# Patient Record
Sex: FEMALE | Race: OTHER | NOT HISPANIC OR LATINO
[De-identification: names, ages, dates, MRNs, and addresses within clinical notes are randomized per-mention and may not be internally consistent; named-entity substitution may affect disease eponyms.]

---

## 2017-02-15 ENCOUNTER — APPOINTMENT (OUTPATIENT)
Dept: OPHTHALMOLOGY | Facility: CLINIC | Age: 72
End: 2017-02-15

## 2017-03-07 ENCOUNTER — APPOINTMENT (OUTPATIENT)
Dept: OPHTHALMOLOGY | Facility: CLINIC | Age: 72
End: 2017-03-07

## 2017-04-22 ENCOUNTER — EMERGENCY (EMERGENCY)
Facility: HOSPITAL | Age: 72
LOS: 1 days | Discharge: HOME | End: 2017-04-22
Attending: EMERGENCY MEDICINE | Admitting: EMERGENCY MEDICINE
Payer: MEDICARE

## 2017-04-22 VITALS
HEART RATE: 77 BPM | TEMPERATURE: 98 F | DIASTOLIC BLOOD PRESSURE: 82 MMHG | RESPIRATION RATE: 18 BRPM | OXYGEN SATURATION: 98 % | SYSTOLIC BLOOD PRESSURE: 181 MMHG

## 2017-04-22 PROCEDURE — 99283 EMERGENCY DEPT VISIT LOW MDM: CPT | Mod: 25

## 2017-04-22 PROCEDURE — 99053 MED SERV 10PM-8AM 24 HR FAC: CPT

## 2017-04-22 NOTE — ED ADULT TRIAGE NOTE - CHIEF COMPLAINT QUOTE
right leg pain from her hip to the right foot.. Pain started around 9pm. Has not taken anything for the pain pt has PMH of rheumatoid arthritis.  c/o nausea. Denies fever.

## 2017-04-22 NOTE — ED ADULT NURSE NOTE - OBJECTIVE STATEMENT
Pt rec'd A&Ox3 c/o of b/l leg cramping and numbness  started 9pm last night and progressed throughout the night. Pt states pain begins in feet and radiated up towards thighs. Pt states movement worsens symptoms. States has arthritis and was wearing pain patches on b/l knees last night and removed after pain started. On exam pt states pain is minimal currently and sensory and neuro exam intact, + b/l ROM and strength in all extremities. slight edema on blle. Denies any increased WOB, pt reports hx of COPD, HTN. Denies recent travel.

## 2017-04-22 NOTE — ED PROVIDER NOTE - PHYSICAL EXAMINATION
Michael att: General: Well appearing, nontoxic, no acute distress; Head: Normocephalic Atraumatic; Eyes: PERRL, EOMI; ENT: Airway patent; Neck: supple; Chest: Lungs clear to auscultation bilateral; Cardiac: Regular rate and rhythm, no murmurs, rubs or gallops; Abdomen: soft, nontender, nondistended; no guarding or rebound; Musculoskeletal: Calves symmetric, nontender, no palpable cord, no discoloration, 2+DP; Skin: No rash, normal skin tone; Neuro: Alert and Oriented to person, place, and time; No focal deficit, CN 2-12 symmetric and intact, strength 5/5

## 2017-04-22 NOTE — ED PROVIDER NOTE - MEDICAL DECISION MAKING DETAILS
71F p/w bilateral leg cramping starting last night, intermittent, in setting of using unknown medical patches, concerning for adverse side effect  -pain control, reassurance 71F p/w bilateral leg cramping starting last night, intermittent, in setting of using unknown medical patches, concerning for adverse side effect  Micheal att: 72 yo woman presenting with transient leg cramps, resolved upon arrival.  No e/o DVT, infection, arterial insufficiency, cord compression, cauda equina. Patient informed of ED visit findings, understands plan.  Patient provided with written and further verbal instructions not included in discharge paperwork.  Patient instructed to follow up with their primary care physician in 2-3 days and return for new, worsened, or persistent symptoms.    -pain control, reassurance

## 2017-04-22 NOTE — ED PROVIDER NOTE - OBJECTIVE STATEMENT
71F h/o HTN, COPD, arthritis presenting with leg pain. Notes bilateral leg pain starting last night, starting in the feet and radiating up the legs to thighs, 2/10, cramping, intermittent, lasting few minutes at a time. Was using medical patches on both knees (unknown Chinese medicine) over past few days, removed them today when pain started. Denies F, CP, SOB, abd pain, N/V/D, weakness, numbness. 71F h/o HTN, COPD, arthritis presenting with leg pain. Notes bilateral leg pain starting last night, starting in the feet and radiating up the legs to thighs, 2/10, cramping, intermittent, lasting few minutes at a time. States she drank some water and her symptoms resolved.  Was using medical patches on both knees (unknown Chinese medicine) over past few days, removed them today when pain started. Denies F, CP, SOB, abd pain, N/V/D, weakness, numbness.

## 2018-05-29 ENCOUNTER — APPOINTMENT (OUTPATIENT)
Dept: OPHTHALMOLOGY | Facility: CLINIC | Age: 73
End: 2018-05-29
Payer: MEDICARE

## 2018-05-29 PROCEDURE — 92133 CPTRZD OPH DX IMG PST SGM ON: CPT

## 2018-05-29 PROCEDURE — 92225: CPT | Mod: RT

## 2018-05-29 PROCEDURE — 92014 COMPRE OPH EXAM EST PT 1/>: CPT

## 2018-05-29 PROCEDURE — 92083 EXTENDED VISUAL FIELD XM: CPT

## 2019-01-17 ENCOUNTER — APPOINTMENT (OUTPATIENT)
Dept: OPHTHALMOLOGY | Facility: CLINIC | Age: 74
End: 2019-01-17
Payer: MEDICARE

## 2019-01-17 PROCEDURE — 92226: CPT | Mod: RT

## 2019-01-17 PROCEDURE — 92014 COMPRE OPH EXAM EST PT 1/>: CPT

## 2019-01-17 PROCEDURE — 92286 ANT SGM IMG I&R SPECLR MIC: CPT

## 2019-03-27 ENCOUNTER — EMERGENCY (EMERGENCY)
Facility: HOSPITAL | Age: 74
LOS: 1 days | Discharge: HOME | End: 2019-03-27
Attending: EMERGENCY MEDICINE | Admitting: EMERGENCY MEDICINE
Payer: MEDICARE

## 2019-03-27 VITALS
HEART RATE: 93 BPM | TEMPERATURE: 98 F | OXYGEN SATURATION: 100 % | SYSTOLIC BLOOD PRESSURE: 131 MMHG | DIASTOLIC BLOOD PRESSURE: 93 MMHG | RESPIRATION RATE: 18 BRPM

## 2019-03-27 PROCEDURE — 99284 EMERGENCY DEPT VISIT MOD MDM: CPT | Mod: 25

## 2019-03-27 NOTE — ED ADULT TRIAGE NOTE - CHIEF COMPLAINT QUOTE
pt c/o abdominal pain, was seen by her pmd at diagnosed with  a uti, placed on amoxicillin last week, states "I still do not feel well," no fevers, pt also states she hit her head a month ago on the cabinet and c/o headache, no blurry vision pmh htn. comfortable in triage.

## 2019-03-28 VITALS
RESPIRATION RATE: 17 BRPM | OXYGEN SATURATION: 98 % | SYSTOLIC BLOOD PRESSURE: 130 MMHG | DIASTOLIC BLOOD PRESSURE: 70 MMHG | HEART RATE: 75 BPM

## 2019-03-28 LAB
ALBUMIN SERPL ELPH-MCNC: 3.3 G/DL — SIGNIFICANT CHANGE UP (ref 3.3–5)
ALP SERPL-CCNC: 77 U/L — SIGNIFICANT CHANGE UP (ref 40–120)
ALT FLD-CCNC: 34 U/L — HIGH (ref 4–33)
ANION GAP SERPL CALC-SCNC: 12 MMO/L — SIGNIFICANT CHANGE UP (ref 7–14)
APPEARANCE UR: CLEAR — SIGNIFICANT CHANGE UP
AST SERPL-CCNC: 51 U/L — HIGH (ref 4–32)
BACTERIA # UR AUTO: NEGATIVE — SIGNIFICANT CHANGE UP
BASOPHILS # BLD AUTO: 0.03 K/UL — SIGNIFICANT CHANGE UP (ref 0–0.2)
BASOPHILS NFR BLD AUTO: 0.3 % — SIGNIFICANT CHANGE UP (ref 0–2)
BILIRUB SERPL-MCNC: 0.7 MG/DL — SIGNIFICANT CHANGE UP (ref 0.2–1.2)
BILIRUB UR-MCNC: NEGATIVE — SIGNIFICANT CHANGE UP
BLOOD UR QL VISUAL: SIGNIFICANT CHANGE UP
BUN SERPL-MCNC: 18 MG/DL — SIGNIFICANT CHANGE UP (ref 7–23)
CALCIUM SERPL-MCNC: 8.6 MG/DL — SIGNIFICANT CHANGE UP (ref 8.4–10.5)
CHLORIDE SERPL-SCNC: 100 MMOL/L — SIGNIFICANT CHANGE UP (ref 98–107)
CO2 SERPL-SCNC: 20 MMOL/L — LOW (ref 22–31)
COLOR SPEC: YELLOW — SIGNIFICANT CHANGE UP
CREAT SERPL-MCNC: 0.71 MG/DL — SIGNIFICANT CHANGE UP (ref 0.5–1.3)
EOSINOPHIL # BLD AUTO: 0.07 K/UL — SIGNIFICANT CHANGE UP (ref 0–0.5)
EOSINOPHIL NFR BLD AUTO: 0.7 % — SIGNIFICANT CHANGE UP (ref 0–6)
GLUCOSE SERPL-MCNC: 123 MG/DL — HIGH (ref 70–99)
GLUCOSE UR-MCNC: NEGATIVE — SIGNIFICANT CHANGE UP
HCT VFR BLD CALC: 40.9 % — SIGNIFICANT CHANGE UP (ref 34.5–45)
HGB BLD-MCNC: 13.6 G/DL — SIGNIFICANT CHANGE UP (ref 11.5–15.5)
HYALINE CASTS # UR AUTO: SIGNIFICANT CHANGE UP
IMM GRANULOCYTES NFR BLD AUTO: 0.3 % — SIGNIFICANT CHANGE UP (ref 0–1.5)
KETONES UR-MCNC: SIGNIFICANT CHANGE UP
LEUKOCYTE ESTERASE UR-ACNC: SIGNIFICANT CHANGE UP
LIDOCAIN IGE QN: 37.3 U/L — SIGNIFICANT CHANGE UP (ref 7–60)
LYMPHOCYTES # BLD AUTO: 1.32 K/UL — SIGNIFICANT CHANGE UP (ref 1–3.3)
LYMPHOCYTES # BLD AUTO: 13.4 % — SIGNIFICANT CHANGE UP (ref 13–44)
MCHC RBC-ENTMCNC: 30.2 PG — SIGNIFICANT CHANGE UP (ref 27–34)
MCHC RBC-ENTMCNC: 33.3 % — SIGNIFICANT CHANGE UP (ref 32–36)
MCV RBC AUTO: 90.7 FL — SIGNIFICANT CHANGE UP (ref 80–100)
MONOCYTES # BLD AUTO: 1.04 K/UL — HIGH (ref 0–0.9)
MONOCYTES NFR BLD AUTO: 10.6 % — SIGNIFICANT CHANGE UP (ref 2–14)
NEUTROPHILS # BLD AUTO: 7.34 K/UL — SIGNIFICANT CHANGE UP (ref 1.8–7.4)
NEUTROPHILS NFR BLD AUTO: 74.7 % — SIGNIFICANT CHANGE UP (ref 43–77)
NITRITE UR-MCNC: NEGATIVE — SIGNIFICANT CHANGE UP
NRBC # FLD: 0 K/UL — SIGNIFICANT CHANGE UP (ref 0–0)
PH UR: 6 — SIGNIFICANT CHANGE UP (ref 5–8)
PLATELET # BLD AUTO: 193 K/UL — SIGNIFICANT CHANGE UP (ref 150–400)
PMV BLD: 10.2 FL — SIGNIFICANT CHANGE UP (ref 7–13)
POTASSIUM SERPL-MCNC: 4.7 MMOL/L — SIGNIFICANT CHANGE UP (ref 3.5–5.3)
POTASSIUM SERPL-SCNC: 4.7 MMOL/L — SIGNIFICANT CHANGE UP (ref 3.5–5.3)
PROT SERPL-MCNC: 6.8 G/DL — SIGNIFICANT CHANGE UP (ref 6–8.3)
PROT UR-MCNC: 30 — SIGNIFICANT CHANGE UP
RBC # BLD: 4.51 M/UL — SIGNIFICANT CHANGE UP (ref 3.8–5.2)
RBC # FLD: 11.9 % — SIGNIFICANT CHANGE UP (ref 10.3–14.5)
RBC CASTS # UR COMP ASSIST: HIGH (ref 0–?)
SODIUM SERPL-SCNC: 132 MMOL/L — LOW (ref 135–145)
SP GR SPEC: 1.02 — SIGNIFICANT CHANGE UP (ref 1–1.04)
SQUAMOUS # UR AUTO: SIGNIFICANT CHANGE UP
UROBILINOGEN FLD QL: NORMAL — SIGNIFICANT CHANGE UP
WBC # BLD: 9.83 K/UL — SIGNIFICANT CHANGE UP (ref 3.8–10.5)
WBC # FLD AUTO: 9.83 K/UL — SIGNIFICANT CHANGE UP (ref 3.8–10.5)
WBC UR QL: HIGH (ref 0–?)

## 2019-03-28 PROCEDURE — 70450 CT HEAD/BRAIN W/O DYE: CPT | Mod: 26

## 2019-03-28 RX ORDER — FAMOTIDINE 10 MG/ML
20 INJECTION INTRAVENOUS ONCE
Qty: 0 | Refills: 0 | Status: COMPLETED | OUTPATIENT
Start: 2019-03-28 | End: 2019-03-28

## 2019-03-28 RX ORDER — ACETAMINOPHEN 500 MG
650 TABLET ORAL ONCE
Qty: 0 | Refills: 0 | Status: COMPLETED | OUTPATIENT
Start: 2019-03-28 | End: 2019-03-28

## 2019-03-28 RX ADMIN — FAMOTIDINE 20 MILLIGRAM(S): 10 INJECTION INTRAVENOUS at 03:23

## 2019-03-28 RX ADMIN — Medication 650 MILLIGRAM(S): at 04:20

## 2019-03-28 RX ADMIN — Medication 650 MILLIGRAM(S): at 03:23

## 2019-03-28 RX ADMIN — Medication 30 MILLILITER(S): at 03:23

## 2019-03-28 NOTE — ED ADULT NURSE NOTE - OBJECTIVE STATEMENT
pt received in room 1, A&Ox3, c/o abd pain x2 weeks, and a headache [id number]. pt states she lost her balance a month ago and hit her head, no contusions noted, pt saw her pmd recently and was on amoxicillin last week for a UTI. pt breathing even & unlabored, abd tender all over, voluntary guarding. pt denies SOB,cp, fever, chills, numbness, tingling, weakness, dizziness, syncope, dysuria, no difference in eating/drinking. 20G I vplaced in R forearm, labs sent, will continue to monitor.

## 2019-03-28 NOTE — ED PROVIDER NOTE - NSFOLLOWUPINSTRUCTIONS_ED_ALL_ED_FT
Drink plenty of fluids.  Continue to take your antibiotics as prescribed.  You can take ibuprofen 600mg every 6 hours or Tylenol 650mg every 4 hours as needed for pain or fever.  Follow-up with your PMD in 24-48 hours.  Return to the emergency department for any new or worsening symptoms.

## 2019-03-28 NOTE — ED PROVIDER NOTE - CLINICAL SUMMARY MEDICAL DECISION MAKING FREE TEXT BOX
74 y/o F with headache s/p low-impact trauma.  No red flags, normal neuro exam.  Pt initial c/o abd pain, but none on my evaluation or exam.  Will give tylenol, labs, ct head given age, reassess.

## 2019-03-28 NOTE — ED PROVIDER NOTE - OBJECTIVE STATEMENT
72 y/o F with h/o HTN, hyperlipidemia here with headache.  Pt reports that over the past 1 month she has hit her head on cabinets in her home on accident.  She reports an episode 1 month ago and another episode on Monday where she accidentally struck her right frontal head on a cabinet in the bathroom.  She denies any LOC or fall.  No other injuries or trauma.  Pt reports headache to the site of impact since then.  No confusion, vision changes, vomiting, weakness.  Pt also reports that she was seen by PMD on Monday and started on abx for UTI.  She reports some stomach upset on the antibiotic, but states that her dysuria is resolved.  No abd pain currently.  Pt does not take any antiplatelet or anticoagulants.

## 2019-03-28 NOTE — ED PROVIDER NOTE - PROGRESS NOTE DETAILS
Giuliano ADAMS: CT negative for traumatic injury.  Labs reviewed, UA with LE but no bacturia and pt is currently being treated for UTI with PO abx and reports improvement in sxs, will just have complete outpatient course.

## 2019-03-29 LAB — SPECIMEN SOURCE: SIGNIFICANT CHANGE UP

## 2019-03-30 LAB — BACTERIA UR CULT: SIGNIFICANT CHANGE UP

## 2019-05-16 ENCOUNTER — APPOINTMENT (OUTPATIENT)
Dept: OPHTHALMOLOGY | Facility: CLINIC | Age: 74
End: 2019-05-16

## 2019-08-13 ENCOUNTER — APPOINTMENT (OUTPATIENT)
Dept: OPHTHALMOLOGY | Facility: CLINIC | Age: 74
End: 2019-08-13

## 2019-08-13 ENCOUNTER — NON-APPOINTMENT (OUTPATIENT)
Age: 74
End: 2019-08-13

## 2019-08-13 ENCOUNTER — APPOINTMENT (OUTPATIENT)
Dept: OPHTHALMOLOGY | Facility: CLINIC | Age: 74
End: 2019-08-13
Payer: MEDICARE

## 2019-08-13 PROCEDURE — 92015 DETERMINE REFRACTIVE STATE: CPT

## 2019-08-13 PROCEDURE — 92014 COMPRE OPH EXAM EST PT 1/>: CPT

## 2019-08-13 PROCEDURE — 92083 EXTENDED VISUAL FIELD XM: CPT

## 2019-08-13 PROCEDURE — 92133 CPTRZD OPH DX IMG PST SGM ON: CPT

## 2019-12-06 ENCOUNTER — EMERGENCY (EMERGENCY)
Facility: HOSPITAL | Age: 74
LOS: 1 days | Discharge: HOME | End: 2019-12-06
Attending: EMERGENCY MEDICINE | Admitting: EMERGENCY MEDICINE
Payer: MEDICARE

## 2019-12-06 VITALS
DIASTOLIC BLOOD PRESSURE: 67 MMHG | OXYGEN SATURATION: 99 % | RESPIRATION RATE: 16 BRPM | TEMPERATURE: 98 F | HEART RATE: 88 BPM | SYSTOLIC BLOOD PRESSURE: 120 MMHG

## 2019-12-06 VITALS
OXYGEN SATURATION: 98 % | DIASTOLIC BLOOD PRESSURE: 53 MMHG | HEART RATE: 71 BPM | RESPIRATION RATE: 17 BRPM | TEMPERATURE: 98 F | SYSTOLIC BLOOD PRESSURE: 104 MMHG

## 2019-12-06 LAB
ALBUMIN SERPL ELPH-MCNC: 3.9 G/DL — SIGNIFICANT CHANGE UP (ref 3.3–5)
ALP SERPL-CCNC: 105 U/L — SIGNIFICANT CHANGE UP (ref 40–120)
ALT FLD-CCNC: 70 U/L — HIGH (ref 4–33)
ANION GAP SERPL CALC-SCNC: 13 MMO/L — SIGNIFICANT CHANGE UP (ref 7–14)
APPEARANCE UR: SIGNIFICANT CHANGE UP
AST SERPL-CCNC: 73 U/L — HIGH (ref 4–32)
BACTERIA # UR AUTO: HIGH
BASOPHILS # BLD AUTO: 0.04 K/UL — SIGNIFICANT CHANGE UP (ref 0–0.2)
BASOPHILS NFR BLD AUTO: 0.5 % — SIGNIFICANT CHANGE UP (ref 0–2)
BILIRUB SERPL-MCNC: 1.2 MG/DL — SIGNIFICANT CHANGE UP (ref 0.2–1.2)
BILIRUB UR-MCNC: NEGATIVE — SIGNIFICANT CHANGE UP
BLOOD UR QL VISUAL: SIGNIFICANT CHANGE UP
BUN SERPL-MCNC: 24 MG/DL — HIGH (ref 7–23)
CALCIUM SERPL-MCNC: 9.2 MG/DL — SIGNIFICANT CHANGE UP (ref 8.4–10.5)
CHLORIDE SERPL-SCNC: 94 MMOL/L — LOW (ref 98–107)
CO2 SERPL-SCNC: 25 MMOL/L — SIGNIFICANT CHANGE UP (ref 22–31)
COLOR SPEC: YELLOW — SIGNIFICANT CHANGE UP
CREAT SERPL-MCNC: 0.89 MG/DL — SIGNIFICANT CHANGE UP (ref 0.5–1.3)
EOSINOPHIL # BLD AUTO: 0.07 K/UL — SIGNIFICANT CHANGE UP (ref 0–0.5)
EOSINOPHIL NFR BLD AUTO: 0.8 % — SIGNIFICANT CHANGE UP (ref 0–6)
GLUCOSE SERPL-MCNC: 126 MG/DL — HIGH (ref 70–99)
GLUCOSE UR-MCNC: NEGATIVE — SIGNIFICANT CHANGE UP
HCT VFR BLD CALC: 37.7 % — SIGNIFICANT CHANGE UP (ref 34.5–45)
HGB BLD-MCNC: 13 G/DL — SIGNIFICANT CHANGE UP (ref 11.5–15.5)
HYALINE CASTS # UR AUTO: NEGATIVE — SIGNIFICANT CHANGE UP
IMM GRANULOCYTES NFR BLD AUTO: 0.5 % — SIGNIFICANT CHANGE UP (ref 0–1.5)
KETONES UR-MCNC: NEGATIVE — SIGNIFICANT CHANGE UP
LEUKOCYTE ESTERASE UR-ACNC: SIGNIFICANT CHANGE UP
LYMPHOCYTES # BLD AUTO: 1.62 K/UL — SIGNIFICANT CHANGE UP (ref 1–3.3)
LYMPHOCYTES # BLD AUTO: 18.3 % — SIGNIFICANT CHANGE UP (ref 13–44)
MCHC RBC-ENTMCNC: 30.4 PG — SIGNIFICANT CHANGE UP (ref 27–34)
MCHC RBC-ENTMCNC: 34.5 % — SIGNIFICANT CHANGE UP (ref 32–36)
MCV RBC AUTO: 88.3 FL — SIGNIFICANT CHANGE UP (ref 80–100)
MONOCYTES # BLD AUTO: 1.06 K/UL — HIGH (ref 0–0.9)
MONOCYTES NFR BLD AUTO: 12 % — SIGNIFICANT CHANGE UP (ref 2–14)
NEUTROPHILS # BLD AUTO: 6.01 K/UL — SIGNIFICANT CHANGE UP (ref 1.8–7.4)
NEUTROPHILS NFR BLD AUTO: 67.9 % — SIGNIFICANT CHANGE UP (ref 43–77)
NITRITE UR-MCNC: POSITIVE — HIGH
NRBC # FLD: 0 K/UL — SIGNIFICANT CHANGE UP (ref 0–0)
PH UR: 6 — SIGNIFICANT CHANGE UP (ref 5–8)
PLATELET # BLD AUTO: 244 K/UL — SIGNIFICANT CHANGE UP (ref 150–400)
PMV BLD: 9.4 FL — SIGNIFICANT CHANGE UP (ref 7–13)
POTASSIUM SERPL-MCNC: 3.8 MMOL/L — SIGNIFICANT CHANGE UP (ref 3.5–5.3)
POTASSIUM SERPL-SCNC: 3.8 MMOL/L — SIGNIFICANT CHANGE UP (ref 3.5–5.3)
PROT SERPL-MCNC: 7.7 G/DL — SIGNIFICANT CHANGE UP (ref 6–8.3)
PROT UR-MCNC: 20 — SIGNIFICANT CHANGE UP
RBC # BLD: 4.27 M/UL — SIGNIFICANT CHANGE UP (ref 3.8–5.2)
RBC # FLD: 11.6 % — SIGNIFICANT CHANGE UP (ref 10.3–14.5)
RBC CASTS # UR COMP ASSIST: SIGNIFICANT CHANGE UP (ref 0–?)
SODIUM SERPL-SCNC: 132 MMOL/L — LOW (ref 135–145)
SP GR SPEC: 1.01 — SIGNIFICANT CHANGE UP (ref 1–1.04)
SQUAMOUS # UR AUTO: SIGNIFICANT CHANGE UP
TROPONIN T, HIGH SENSITIVITY: 10 NG/L — SIGNIFICANT CHANGE UP (ref ?–14)
TROPONIN T, HIGH SENSITIVITY: 11 NG/L — SIGNIFICANT CHANGE UP (ref ?–14)
TROPONIN T, HIGH SENSITIVITY: 8 NG/L — SIGNIFICANT CHANGE UP (ref ?–14)
UROBILINOGEN FLD QL: NORMAL — SIGNIFICANT CHANGE UP
WBC # BLD: 8.84 K/UL — SIGNIFICANT CHANGE UP (ref 3.8–10.5)
WBC # FLD AUTO: 8.84 K/UL — SIGNIFICANT CHANGE UP (ref 3.8–10.5)
WBC UR QL: >50 — HIGH (ref 0–?)

## 2019-12-06 PROCEDURE — 93010 ELECTROCARDIOGRAM REPORT: CPT

## 2019-12-06 PROCEDURE — 71046 X-RAY EXAM CHEST 2 VIEWS: CPT | Mod: 26

## 2019-12-06 PROCEDURE — 99284 EMERGENCY DEPT VISIT MOD MDM: CPT | Mod: 25

## 2019-12-06 RX ORDER — SODIUM CHLORIDE 9 MG/ML
1000 INJECTION INTRAMUSCULAR; INTRAVENOUS; SUBCUTANEOUS ONCE
Refills: 0 | Status: COMPLETED | OUTPATIENT
Start: 2019-12-06 | End: 2019-12-06

## 2019-12-06 RX ORDER — KETOROLAC TROMETHAMINE 30 MG/ML
15 SYRINGE (ML) INJECTION ONCE
Refills: 0 | Status: DISCONTINUED | OUTPATIENT
Start: 2019-12-06 | End: 2019-12-06

## 2019-12-06 RX ORDER — CEFTRIAXONE 500 MG/1
1000 INJECTION, POWDER, FOR SOLUTION INTRAMUSCULAR; INTRAVENOUS ONCE
Refills: 0 | Status: COMPLETED | OUTPATIENT
Start: 2019-12-06 | End: 2019-12-06

## 2019-12-06 RX ORDER — CEPHALEXIN 500 MG
1 CAPSULE ORAL
Qty: 14 | Refills: 0
Start: 2019-12-06 | End: 2019-12-12

## 2019-12-06 RX ORDER — ACETAMINOPHEN 500 MG
650 TABLET ORAL ONCE
Refills: 0 | Status: COMPLETED | OUTPATIENT
Start: 2019-12-06 | End: 2019-12-06

## 2019-12-06 RX ORDER — ASPIRIN/CALCIUM CARB/MAGNESIUM 324 MG
324 TABLET ORAL ONCE
Refills: 0 | Status: COMPLETED | OUTPATIENT
Start: 2019-12-06 | End: 2019-12-06

## 2019-12-06 RX ADMIN — Medication 650 MILLIGRAM(S): at 16:25

## 2019-12-06 RX ADMIN — CEFTRIAXONE 1000 MILLIGRAM(S): 500 INJECTION, POWDER, FOR SOLUTION INTRAMUSCULAR; INTRAVENOUS at 16:26

## 2019-12-06 RX ADMIN — SODIUM CHLORIDE 1000 MILLILITER(S): 9 INJECTION INTRAMUSCULAR; INTRAVENOUS; SUBCUTANEOUS at 14:15

## 2019-12-06 RX ADMIN — Medication 650 MILLIGRAM(S): at 12:27

## 2019-12-06 RX ADMIN — Medication 15 MILLIGRAM(S): at 16:26

## 2019-12-06 RX ADMIN — Medication 15 MILLIGRAM(S): at 12:27

## 2019-12-06 RX ADMIN — CEFTRIAXONE 100 MILLIGRAM(S): 500 INJECTION, POWDER, FOR SOLUTION INTRAMUSCULAR; INTRAVENOUS at 14:21

## 2019-12-06 RX ADMIN — Medication 324 MILLIGRAM(S): at 16:33

## 2019-12-06 RX ADMIN — SODIUM CHLORIDE 1000 MILLILITER(S): 9 INJECTION INTRAMUSCULAR; INTRAVENOUS; SUBCUTANEOUS at 12:27

## 2019-12-06 NOTE — ED ADULT TRIAGE NOTE - CHIEF COMPLAINT QUOTE
Pt brought in by EMS from home complaining of weakness, body aches. Pt also complaining of decrease appetite and chest pain. Pt denies SOB, N/V/D, fever or chills.

## 2019-12-06 NOTE — ED PROVIDER NOTE - CLINICAL SUMMARY MEDICAL DECISION MAKING FREE TEXT BOX
75yo female with pmh of htn, hld, copd presents with generalized malaise associated with anorexia, urinary frequency and headache x 5 days. Vitals stable upon arrival. Neurologically intact. Physical exam unremarkable except for pain elicited with palpation of L anterior chest. Not likely acs but will get EKG and trop to r/o cardiac pathology. Less likely pneumonia but will get cxr to evaluate for lung involvement. Will also get CBC, CMP and UA/Ucx. Most likely viral syndrome vs uti. Will give toradol for pain. 75yo female with pmh of htn, hld, copd presents with generalized malaise associated with anorexia, urinary frequency and headache x 5 days. Vitals stable upon arrival. Neurologically intact. Physical exam unremarkable except for pain elicited with palpation of L anterior chest. Not likely acs but will get EKG and trop to r/o cardiac pathology. Less likely pneumonia but will get cxr to evaluate for lung involvement. Will also get CBC, CMP and UA/Ucx. Most likely viral syndrome vs uti. Will give toradol and tyelnol for pain. Will give fluids. 73yo female with pmh of htn, hld, copd presents with generalized malaise associated with anorexia, urinary frequency and headache x 5 days. Vitals stable upon arrival. Neurologically intact. Physical exam unremarkable except for pain elicited with palpation of L anterior chest. Not likely acs but will get EKG and trop to r/o cardiac pathology. Less likely pneumonia but will get cxr to evaluate for lung involvement. Will also get CBC, CMP and UA/Ucx. Most likely viral syndrome vs uti. Will give toradol and tyelnol for pain. Will give fluids.  Joselin: weakness generalized, headache (slow onset no fever) neuro normal. + chest pain and body aches. possibly some depression. will check labs and studies for infectious or other causes.

## 2019-12-06 NOTE — ED PROVIDER NOTE - ENMT, MLM
NC/AT. Airway patent, Mouth with normal mucosa. Throat has no vesicles, no oropharyngeal exudates and uvula is midline.

## 2019-12-06 NOTE — ED ADULT NURSE NOTE - OBJECTIVE STATEMENT
Pt alert and oriented x4. Pt. is ambulatory. Lung sounds clear bilaterally. Respirations even and unlabored. Bowel sounds present in all 4 quadrants. Abdomen soft, nondistended and tender in left lower quadrant. Skin is intact with no swelling seen in extremities. Positive pedal pulses present. Pt. came in complaining of weakness throughout her body and pain in her upper left chest for the past 5 days. Pt. also complains of pain in her back but denies any injury or falls. Pt. states that she has not had an appetite for the last "few days". Left hand 20g IV placed, labs drawn and sent. Hx of COPD, htn and HDL.

## 2019-12-06 NOTE — ED PROVIDER NOTE - NSFOLLOWUPINSTRUCTIONS_ED_ALL_ED_FT
Take Keflex 500mg 1 tab every 12 hours for 1 week for your infection.  Follow up with your primary care doctor in 1 week.   Return to ED if you experience any chest pain, shortness of breath, high fevers, blood in the urine or other concerning symptoms.

## 2019-12-06 NOTE — ED PROVIDER NOTE - PROGRESS NOTE DETAILS
Saleem: Patient says pain has improved markedly. Was napping comfortably in the room. Trop noted. Will get repeat. Saleem: 3 hour trop 10 from 11. Patient is feeling better and comfortable to go home. Will dc with keflex and PCP follow up

## 2019-12-06 NOTE — ED ADULT NURSE NOTE - NS PRO PASSIVE SMOKE EXP
Pt is a 57 y.o. 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 LNW and time of onset 4:45AM on 8/12. Neurological exam significant for numbness to LT in all LUE, drift within 10 seconds but did not hit bed, slowed rapid movements and orbiting on left. Pt s/p tPA on 12-Aug-2019 07:05. Continued below. Unknown

## 2019-12-06 NOTE — ED PROVIDER NOTE - ATTENDING CONTRIBUTION TO CARE
I performed a history and physical exam of the patient and discussed their management with the resident and /or advanced care provider. I reviewed the resident and /or ACP's note and agree with the documented findings and plan of care. My medical decison making and observations are found above.  Nl gait, lungs clear

## 2019-12-06 NOTE — ED PROVIDER NOTE - OBJECTIVE STATEMENT
73yo female with pmh of htn, hld and copd (no longer needing treatment) presents complaining of generalized malaise x 5 days. States that since Monday she has lost her appetite, only eating one small meal a day. She reports generalized weakness, subjective fevers, myalgias and a whole head dull headache. Denies antecedent trauma, photophobia, slurred speech, unilateral weakness, ataxia, loc. She also states she has new onset increased urinary with some incontinence that she describes as a sudden feeling she needs to urinate and then it comes out before she gets to the bathroom. Denies dysuria and hematuria. Patient also complaining of L sided dull chest pain that does not radiate and is made worse with touch. Denies sob, palpitations, dizziness, change in baseline cough, diaphoresis, abdominal pain, nausea/vomiting, diarrhea, difficulty swallowing, throat pain, nasal congestion, lower extremity swelling, and other associated symptoms. Of note: Patient states she has not taken her medications in 2 days due to lack of appetites. Also, patient reports significant family history of brother dying from hemorrhagic stroke at 56yo.

## 2019-12-06 NOTE — ED PROVIDER NOTE - PATIENT PORTAL LINK FT
You can access the FollowMyHealth Patient Portal offered by [facility] by registering at the following website: [email or web address]. By joining [employer]’s FollowMyHealth portal, you will also be able to view your health information using other applications (apps) compatible with our system.

## 2019-12-06 NOTE — ED ADULT NURSE REASSESSMENT NOTE - NS ED NURSE REASSESS COMMENT FT1
covering primary RN for break pt is in bed A and Ox 3 in NAD, on monitor with VSS, NSR noted, pending re-eval/dispo at this time. orders noted and completed, pt denies c/p, discomfort or SOB.
Pt. is alert and oriented x4. Pt. denies any complaints of distress. Will continue to monitor.

## 2019-12-07 LAB — SPECIMEN SOURCE: SIGNIFICANT CHANGE UP

## 2019-12-08 LAB
-  AMIKACIN: SIGNIFICANT CHANGE UP
-  AMPICILLIN/SULBACTAM: SIGNIFICANT CHANGE UP
-  AMPICILLIN: SIGNIFICANT CHANGE UP
-  AZTREONAM: SIGNIFICANT CHANGE UP
-  CEFAZOLIN: SIGNIFICANT CHANGE UP
-  CEFEPIME: SIGNIFICANT CHANGE UP
-  CEFOXITIN: SIGNIFICANT CHANGE UP
-  CEFTAZIDIME: SIGNIFICANT CHANGE UP
-  CEFTRIAXONE: SIGNIFICANT CHANGE UP
-  ERTAPENEM: SIGNIFICANT CHANGE UP
-  GENTAMICIN: SIGNIFICANT CHANGE UP
-  IMIPENEM: SIGNIFICANT CHANGE UP
-  LEVOFLOXACIN: SIGNIFICANT CHANGE UP
-  MEROPENEM: SIGNIFICANT CHANGE UP
-  NITROFURANTOIN: SIGNIFICANT CHANGE UP
-  PIPERACILLIN/TAZOBACTAM: SIGNIFICANT CHANGE UP
-  TIGECYCLINE: SIGNIFICANT CHANGE UP
-  TOBRAMYCIN: SIGNIFICANT CHANGE UP
-  TRIMETHOPRIM/SULFAMETHOXAZOLE: SIGNIFICANT CHANGE UP
BACTERIA UR CULT: SIGNIFICANT CHANGE UP
METHOD TYPE: SIGNIFICANT CHANGE UP
ORGANISM # SPEC MICROSCOPIC CNT: SIGNIFICANT CHANGE UP
ORGANISM # SPEC MICROSCOPIC CNT: SIGNIFICANT CHANGE UP

## 2020-03-02 ENCOUNTER — EMERGENCY (EMERGENCY)
Facility: HOSPITAL | Age: 75
LOS: 1 days | Discharge: HOME | End: 2020-03-02
Attending: EMERGENCY MEDICINE
Payer: MEDICARE

## 2020-03-02 VITALS
OXYGEN SATURATION: 96 % | TEMPERATURE: 99 F | HEIGHT: 60 IN | DIASTOLIC BLOOD PRESSURE: 66 MMHG | WEIGHT: 139.99 LBS | RESPIRATION RATE: 16 BRPM | SYSTOLIC BLOOD PRESSURE: 127 MMHG | HEART RATE: 98 BPM

## 2020-03-02 VITALS
OXYGEN SATURATION: 95 % | SYSTOLIC BLOOD PRESSURE: 104 MMHG | HEART RATE: 89 BPM | DIASTOLIC BLOOD PRESSURE: 56 MMHG | TEMPERATURE: 102 F | RESPIRATION RATE: 18 BRPM

## 2020-03-02 LAB
ALBUMIN SERPL ELPH-MCNC: 4.3 G/DL — SIGNIFICANT CHANGE UP (ref 3.3–5)
ALP SERPL-CCNC: 62 U/L — SIGNIFICANT CHANGE UP (ref 40–120)
ALT FLD-CCNC: 23 U/L — SIGNIFICANT CHANGE UP (ref 10–45)
ANION GAP SERPL CALC-SCNC: 14 MMOL/L — SIGNIFICANT CHANGE UP (ref 5–17)
APPEARANCE UR: CLEAR — SIGNIFICANT CHANGE UP
AST SERPL-CCNC: 33 U/L — SIGNIFICANT CHANGE UP (ref 10–40)
BASOPHILS # BLD AUTO: 0.04 K/UL — SIGNIFICANT CHANGE UP (ref 0–0.2)
BASOPHILS NFR BLD AUTO: 0.5 % — SIGNIFICANT CHANGE UP (ref 0–2)
BILIRUB SERPL-MCNC: 0.7 MG/DL — SIGNIFICANT CHANGE UP (ref 0.2–1.2)
BILIRUB UR-MCNC: NEGATIVE — SIGNIFICANT CHANGE UP
BUN SERPL-MCNC: 18 MG/DL — SIGNIFICANT CHANGE UP (ref 7–23)
CALCIUM SERPL-MCNC: 9.6 MG/DL — SIGNIFICANT CHANGE UP (ref 8.4–10.5)
CHLORIDE SERPL-SCNC: 92 MMOL/L — LOW (ref 96–108)
CO2 SERPL-SCNC: 24 MMOL/L — SIGNIFICANT CHANGE UP (ref 22–31)
COLOR SPEC: YELLOW — SIGNIFICANT CHANGE UP
CREAT SERPL-MCNC: 0.85 MG/DL — SIGNIFICANT CHANGE UP (ref 0.5–1.3)
DIFF PNL FLD: ABNORMAL
EOSINOPHIL # BLD AUTO: 0.03 K/UL — SIGNIFICANT CHANGE UP (ref 0–0.5)
EOSINOPHIL NFR BLD AUTO: 0.4 % — SIGNIFICANT CHANGE UP (ref 0–6)
GAS PNL BLDV: SIGNIFICANT CHANGE UP
GLUCOSE SERPL-MCNC: 118 MG/DL — HIGH (ref 70–99)
GLUCOSE UR QL: NEGATIVE — SIGNIFICANT CHANGE UP
HCT VFR BLD CALC: 38.5 % — SIGNIFICANT CHANGE UP (ref 34.5–45)
HGB BLD-MCNC: 12.9 G/DL — SIGNIFICANT CHANGE UP (ref 11.5–15.5)
IMM GRANULOCYTES NFR BLD AUTO: 0.4 % — SIGNIFICANT CHANGE UP (ref 0–1.5)
KETONES UR-MCNC: NEGATIVE — SIGNIFICANT CHANGE UP
LEUKOCYTE ESTERASE UR-ACNC: SIGNIFICANT CHANGE UP
LIDOCAIN IGE QN: 43 U/L — SIGNIFICANT CHANGE UP (ref 7–60)
LYMPHOCYTES # BLD AUTO: 1.19 K/UL — SIGNIFICANT CHANGE UP (ref 1–3.3)
LYMPHOCYTES # BLD AUTO: 15.3 % — SIGNIFICANT CHANGE UP (ref 13–44)
MCHC RBC-ENTMCNC: 30 PG — SIGNIFICANT CHANGE UP (ref 27–34)
MCHC RBC-ENTMCNC: 33.5 GM/DL — SIGNIFICANT CHANGE UP (ref 32–36)
MCV RBC AUTO: 89.5 FL — SIGNIFICANT CHANGE UP (ref 80–100)
MONOCYTES # BLD AUTO: 0.96 K/UL — HIGH (ref 0–0.9)
MONOCYTES NFR BLD AUTO: 12.3 % — SIGNIFICANT CHANGE UP (ref 2–14)
NEUTROPHILS # BLD AUTO: 5.55 K/UL — SIGNIFICANT CHANGE UP (ref 1.8–7.4)
NEUTROPHILS NFR BLD AUTO: 71.1 % — SIGNIFICANT CHANGE UP (ref 43–77)
NITRITE UR-MCNC: NEGATIVE — SIGNIFICANT CHANGE UP
NRBC # BLD: 0 /100 WBCS — SIGNIFICANT CHANGE UP (ref 0–0)
PH UR: 7 — SIGNIFICANT CHANGE UP (ref 5–8)
PLATELET # BLD AUTO: 209 K/UL — SIGNIFICANT CHANGE UP (ref 150–400)
POTASSIUM SERPL-MCNC: 4.1 MMOL/L — SIGNIFICANT CHANGE UP (ref 3.5–5.3)
POTASSIUM SERPL-SCNC: 4.1 MMOL/L — SIGNIFICANT CHANGE UP (ref 3.5–5.3)
PROT SERPL-MCNC: 7.2 G/DL — SIGNIFICANT CHANGE UP (ref 6–8.3)
PROT UR-MCNC: ABNORMAL
RBC # BLD: 4.3 M/UL — SIGNIFICANT CHANGE UP (ref 3.8–5.2)
RBC # FLD: 11.9 % — SIGNIFICANT CHANGE UP (ref 10.3–14.5)
SODIUM SERPL-SCNC: 130 MMOL/L — LOW (ref 135–145)
SP GR SPEC: 1.02 — SIGNIFICANT CHANGE UP (ref 1.01–1.02)
UROBILINOGEN FLD QL: NEGATIVE — SIGNIFICANT CHANGE UP
WBC # BLD: 7.8 K/UL — SIGNIFICANT CHANGE UP (ref 3.8–10.5)
WBC # FLD AUTO: 7.8 K/UL — SIGNIFICANT CHANGE UP (ref 3.8–10.5)

## 2020-03-02 PROCEDURE — 74177 CT ABD & PELVIS W/CONTRAST: CPT

## 2020-03-02 PROCEDURE — 84295 ASSAY OF SERUM SODIUM: CPT

## 2020-03-02 PROCEDURE — 87040 BLOOD CULTURE FOR BACTERIA: CPT

## 2020-03-02 PROCEDURE — 85014 HEMATOCRIT: CPT

## 2020-03-02 PROCEDURE — 74177 CT ABD & PELVIS W/CONTRAST: CPT | Mod: 26

## 2020-03-02 PROCEDURE — 83690 ASSAY OF LIPASE: CPT

## 2020-03-02 PROCEDURE — 71045 X-RAY EXAM CHEST 1 VIEW: CPT | Mod: 26

## 2020-03-02 PROCEDURE — 81001 URINALYSIS AUTO W/SCOPE: CPT

## 2020-03-02 PROCEDURE — 82803 BLOOD GASES ANY COMBINATION: CPT

## 2020-03-02 PROCEDURE — 85027 COMPLETE CBC AUTOMATED: CPT

## 2020-03-02 PROCEDURE — 96374 THER/PROPH/DIAG INJ IV PUSH: CPT | Mod: XU

## 2020-03-02 PROCEDURE — 99284 EMERGENCY DEPT VISIT MOD MDM: CPT | Mod: 25

## 2020-03-02 PROCEDURE — 87086 URINE CULTURE/COLONY COUNT: CPT

## 2020-03-02 PROCEDURE — 93005 ELECTROCARDIOGRAM TRACING: CPT

## 2020-03-02 PROCEDURE — 99285 EMERGENCY DEPT VISIT HI MDM: CPT

## 2020-03-02 PROCEDURE — 82947 ASSAY GLUCOSE BLOOD QUANT: CPT

## 2020-03-02 PROCEDURE — 82435 ASSAY OF BLOOD CHLORIDE: CPT

## 2020-03-02 PROCEDURE — 84132 ASSAY OF SERUM POTASSIUM: CPT

## 2020-03-02 PROCEDURE — 71045 X-RAY EXAM CHEST 1 VIEW: CPT

## 2020-03-02 PROCEDURE — 96375 TX/PRO/DX INJ NEW DRUG ADDON: CPT

## 2020-03-02 PROCEDURE — 87186 SC STD MICRODIL/AGAR DIL: CPT

## 2020-03-02 PROCEDURE — 80053 COMPREHEN METABOLIC PANEL: CPT

## 2020-03-02 PROCEDURE — 83605 ASSAY OF LACTIC ACID: CPT

## 2020-03-02 PROCEDURE — 82330 ASSAY OF CALCIUM: CPT

## 2020-03-02 RX ORDER — PIPERACILLIN AND TAZOBACTAM 4; .5 G/20ML; G/20ML
3.38 INJECTION, POWDER, LYOPHILIZED, FOR SOLUTION INTRAVENOUS ONCE
Refills: 0 | Status: DISCONTINUED | OUTPATIENT
Start: 2020-03-02 | End: 2020-03-02

## 2020-03-02 RX ORDER — SODIUM CHLORIDE 9 MG/ML
1950 INJECTION INTRAMUSCULAR; INTRAVENOUS; SUBCUTANEOUS ONCE
Refills: 0 | Status: DISCONTINUED | OUTPATIENT
Start: 2020-03-02 | End: 2020-03-02

## 2020-03-02 RX ORDER — IBUPROFEN 200 MG
600 TABLET ORAL ONCE
Refills: 0 | Status: COMPLETED | OUTPATIENT
Start: 2020-03-02 | End: 2020-03-02

## 2020-03-02 RX ORDER — FAMOTIDINE 10 MG/ML
20 INJECTION INTRAVENOUS ONCE
Refills: 0 | Status: COMPLETED | OUTPATIENT
Start: 2020-03-02 | End: 2020-03-02

## 2020-03-02 RX ORDER — SODIUM CHLORIDE 9 MG/ML
1000 INJECTION INTRAMUSCULAR; INTRAVENOUS; SUBCUTANEOUS ONCE
Refills: 0 | Status: COMPLETED | OUTPATIENT
Start: 2020-03-02 | End: 2020-03-02

## 2020-03-02 RX ORDER — METOCLOPRAMIDE HCL 10 MG
10 TABLET ORAL ONCE
Refills: 0 | Status: COMPLETED | OUTPATIENT
Start: 2020-03-02 | End: 2020-03-02

## 2020-03-02 RX ORDER — CEFPODOXIME PROXETIL 100 MG
1 TABLET ORAL
Qty: 20 | Refills: 0
Start: 2020-03-02 | End: 2020-03-11

## 2020-03-02 RX ORDER — ACETAMINOPHEN 500 MG
975 TABLET ORAL ONCE
Refills: 0 | Status: COMPLETED | OUTPATIENT
Start: 2020-03-02 | End: 2020-03-02

## 2020-03-02 RX ORDER — ACETAMINOPHEN 500 MG
650 TABLET ORAL ONCE
Refills: 0 | Status: DISCONTINUED | OUTPATIENT
Start: 2020-03-02 | End: 2020-03-02

## 2020-03-02 RX ORDER — CEFPODOXIME PROXETIL 100 MG
200 TABLET ORAL ONCE
Refills: 0 | Status: COMPLETED | OUTPATIENT
Start: 2020-03-02 | End: 2020-03-02

## 2020-03-02 RX ADMIN — FAMOTIDINE 20 MILLIGRAM(S): 10 INJECTION INTRAVENOUS at 11:23

## 2020-03-02 RX ADMIN — Medication 600 MILLIGRAM(S): at 17:56

## 2020-03-02 RX ADMIN — Medication 200 MILLIGRAM(S): at 17:56

## 2020-03-02 RX ADMIN — Medication 10 MILLIGRAM(S): at 16:27

## 2020-03-02 RX ADMIN — SODIUM CHLORIDE 1000 MILLILITER(S): 9 INJECTION INTRAMUSCULAR; INTRAVENOUS; SUBCUTANEOUS at 11:23

## 2020-03-02 RX ADMIN — Medication 975 MILLIGRAM(S): at 16:27

## 2020-03-02 RX ADMIN — Medication 30 MILLILITER(S): at 11:23

## 2020-03-02 NOTE — ED PROVIDER NOTE - OBJECTIVE STATEMENT
75yo female with pmh of htn, hld and copd s/p hysterectomy and appendectomy  p/w NBNB vomiting and frontal HA radiating to the back x 1 day. Pt states that her vomiting started right after eating dinner followed by her headache. Pt state that she became dizzy (room spinning) and lightheaded after she vomited. Report urinary frequency last night. Denies fever, cp, sob, abd pain, diarrhea, blood in stool or urine, dyuria. Norman De Leon MD: 75yo female with pmh of htn, hld and copd s/p hysterectomy and appendectomy  p/w NBNB vomiting and frontal HA radiating to the back x 1 day. Pt states that her vomiting started right after eating dinner followed by her headache. Pt state that she became dizzy (room spinning) and lightheaded after she vomited. Report urinary frequency last night. Denies fever, cp, sob, abd pain, diarrhea, blood in stool or urine, dysuria.

## 2020-03-02 NOTE — ED PROVIDER NOTE - PHYSICAL EXAMINATION
Gen: AAOx3, non-toxic  Head: NCAT  HEENT: EOMI, PERRLA, oral mucosa moist, normal conjunctiva  Lung: CTAB, no respiratory distress, no wheezes/rhonchi/rales B/L, speaking in full sentences  CV: RRR, no murmurs, rubs or gallops  Abd: soft, RUQ TTP ND, no guarding, no CVA tenderness, no rebound tenderness  MSK: no visible deformities, full range of motion of all 4 exts  Neuro: No focal sensory or motor deficits  Skin: Warm, well perfused, no rash  Psych: normal affect.   ~Norman De Leon MD

## 2020-03-02 NOTE — ED PROVIDER NOTE - NS ED ROS FT
GENERAL: No fever or chills, +fatigue, EYES: no change in vision, HEENT: no trouble speaking, CARDIAC: no chest pain, palpitation PULMONARY: no cough or SOB, GI: no abdominal pain, + nausea, + vomiting, no diarrhea or constipation, : No changes in urination, SKIN: no rashes, NEURO: +dizziness no headache,  MSK: No muscle pain ~Norman De Leon MD

## 2020-03-02 NOTE — ED PROVIDER NOTE - NSFOLLOWUPINSTRUCTIONS_ED_ALL_ED_FT
1) Please follow-up with your primary care doctor in the next 2-3 days.  Please call tomorrow for an appointment.  If you cannot follow-up with your primary care doctor please return to the ED for any urgent issues.  2) You were given a copy of the tests performed today.  Please bring the results with you and review them with your primary care doctor.  3) If you have any worsening of symptoms or any other concerns please return to the ED immediately. Such as but not limited to uncontrolled pain, intractable vomiting, or fever > 100.4, blood in stool or black stools, difficulty breathing.  4) Please continue taking your home medications as directed. Please  Cefpodoxime from the pharmacy take 1 tab every 12 hrs

## 2020-03-02 NOTE — ED PROVIDER NOTE - CLINICAL SUMMARY MEDICAL DECISION MAKING FREE TEXT BOX
75yo female with pmh of htn, hld and copd s/p hysterectomy and appendectomy  p/w NBNB vomiting and frontal HA radiating to the back x 1 day. Galllblader pathology vs gastritis. Will get labs lipase, ct abd pelvis ua urine culture

## 2020-03-02 NOTE — ED ADULT NURSE NOTE - NSIMPLEMENTINTERV_GEN_ALL_ED
Implemented All Universal Safety Interventions:  [place] to call system. Call bell, personal items and telephone within reach. Instruct patient to call for assistance. Room bathroom lighting operational. Non-slip footwear when patient is off stretcher. Physically safe environment: no spills, clutter or unnecessary equipment. Stretcher in lowest position, wheels locked, appropriate side rails in place.

## 2020-03-02 NOTE — ED PROVIDER NOTE - PATIENT PORTAL LINK FT
You can access the FollowMyHealth Patient Portal offered by [facility] by registering at the following website: [email or web address]. By joining [employer]’s FollowMyHealth portal, you will also be able to view your health information using other applications (apps) compatible with our system.

## 2020-03-02 NOTE — ED ADULT NURSE NOTE - OBJECTIVE STATEMENT
75 y/o HTN, high cholesterol, arrives to ED c/o vomiting, abdominal pain and headache since yesterday. Patient reports sudden onset vomiting x 6 episodes, nonbloody, accompanied with RLQ abdomen pain, and headache. Patient is a/ox4, well appearing. Reports having less of an appetite this morning. Denies any constipation, diarrhea. Patient found to be febrile 101 orally. No sick contacts, recent travel. No shortness of breath, chest pain, abdomen has mild tenderness to RLQ. No urinary symptoms. IV 20g placed to left arm, labs drawn and sent.

## 2020-03-02 NOTE — ED PROVIDER NOTE - ATTENDING CONTRIBUTION TO CARE
nv  suprapubic to r sided abdominal ttp much lower than mcburney  ct given age / labs      ct wo findings. prob uti but even if pna - 3rd gen ceph to cover. despite febrile, pt well appearing and prob will succeed w home therapy. will dc on po abx. pre-cautions.

## 2020-03-03 NOTE — ED POST DISCHARGE NOTE - ADDITIONAL DOCUMENTATION
UTI + GNR. message left for pt and contact to make sure pt taking abx -  feeling ok. advised them to call back. 3/3/20-430pm -Tylor Draper MD-

## 2020-03-03 NOTE — ED POST DISCHARGE NOTE - DETAILS
UTI + GNR. message left for pt and contact to make sure pt taking abx -  feeling ok. advised them to call back. 3/3/20-430pm -Tylor Draper MD- 3/5/20: LVM for pt for +UCx, will call back to ensure antibiotic compliance. - HIRA DelaenyC left message for patient to call back.  Patient prescribed cefpodoxime.  Cultures are pansensitive to cephalosporins.  Appropriate care provided.  Will not send certified letter.  -Cedric Lopez PA-C

## 2020-03-07 LAB
CULTURE RESULTS: SIGNIFICANT CHANGE UP
CULTURE RESULTS: SIGNIFICANT CHANGE UP
SPECIMEN SOURCE: SIGNIFICANT CHANGE UP
SPECIMEN SOURCE: SIGNIFICANT CHANGE UP

## 2020-11-16 ENCOUNTER — NON-APPOINTMENT (OUTPATIENT)
Age: 75
End: 2020-11-16

## 2020-11-16 ENCOUNTER — APPOINTMENT (OUTPATIENT)
Dept: OPHTHALMOLOGY | Facility: CLINIC | Age: 75
End: 2020-11-16
Payer: MEDICARE

## 2020-11-16 PROCEDURE — 92133 CPTRZD OPH DX IMG PST SGM ON: CPT

## 2020-11-16 PROCEDURE — 92014 COMPRE OPH EXAM EST PT 1/>: CPT

## 2020-11-16 PROCEDURE — 92083 EXTENDED VISUAL FIELD XM: CPT

## 2021-01-05 RX ORDER — AZITHROMYCIN 500 MG/1
0 TABLET, FILM COATED ORAL
Qty: 0 | Refills: 0 | DISCHARGE
Start: 2021-01-05

## 2021-01-10 ENCOUNTER — INPATIENT (INPATIENT)
Facility: HOSPITAL | Age: 76
LOS: 3 days | Discharge: HOME | DRG: 178 | End: 2021-01-14
Attending: INTERNAL MEDICINE | Admitting: INTERNAL MEDICINE
Payer: COMMERCIAL

## 2021-01-10 VITALS
SYSTOLIC BLOOD PRESSURE: 133 MMHG | WEIGHT: 149.91 LBS | HEART RATE: 72 BPM | HEIGHT: 60 IN | RESPIRATION RATE: 22 BRPM | DIASTOLIC BLOOD PRESSURE: 70 MMHG | TEMPERATURE: 99 F | OXYGEN SATURATION: 99 %

## 2021-01-10 DIAGNOSIS — U07.1 COVID-19: ICD-10-CM

## 2021-01-10 DIAGNOSIS — E78.5 HYPERLIPIDEMIA, UNSPECIFIED: ICD-10-CM

## 2021-01-10 DIAGNOSIS — E87.1 HYPO-OSMOLALITY AND HYPONATREMIA: ICD-10-CM

## 2021-01-10 DIAGNOSIS — I10 ESSENTIAL (PRIMARY) HYPERTENSION: ICD-10-CM

## 2021-01-10 LAB
ALBUMIN SERPL ELPH-MCNC: 4 G/DL — SIGNIFICANT CHANGE UP (ref 3.3–5)
ALBUMIN SERPL ELPH-MCNC: 4.2 G/DL — SIGNIFICANT CHANGE UP (ref 3.3–5)
ALP SERPL-CCNC: 70 U/L — SIGNIFICANT CHANGE UP (ref 40–120)
ALP SERPL-CCNC: 73 U/L — SIGNIFICANT CHANGE UP (ref 40–120)
ALT FLD-CCNC: 38 U/L — SIGNIFICANT CHANGE UP (ref 10–45)
ALT FLD-CCNC: 40 U/L — SIGNIFICANT CHANGE UP (ref 10–45)
ANION GAP SERPL CALC-SCNC: 13 MMOL/L — SIGNIFICANT CHANGE UP (ref 5–17)
ANION GAP SERPL CALC-SCNC: 13 MMOL/L — SIGNIFICANT CHANGE UP (ref 5–17)
AST SERPL-CCNC: 51 U/L — HIGH (ref 10–40)
AST SERPL-CCNC: 58 U/L — HIGH (ref 10–40)
BASE EXCESS BLDV CALC-SCNC: 0.7 MMOL/L — SIGNIFICANT CHANGE UP (ref -2–2)
BASOPHILS # BLD AUTO: 0.01 K/UL — SIGNIFICANT CHANGE UP (ref 0–0.2)
BASOPHILS NFR BLD AUTO: 0.2 % — SIGNIFICANT CHANGE UP (ref 0–2)
BILIRUB DIRECT SERPL-MCNC: 0.1 MG/DL — SIGNIFICANT CHANGE UP (ref 0–0.2)
BILIRUB INDIRECT FLD-MCNC: 0.5 MG/DL — SIGNIFICANT CHANGE UP (ref 0.2–1)
BILIRUB SERPL-MCNC: 0.5 MG/DL — SIGNIFICANT CHANGE UP (ref 0.2–1.2)
BILIRUB SERPL-MCNC: 0.6 MG/DL — SIGNIFICANT CHANGE UP (ref 0.2–1.2)
BUN SERPL-MCNC: 12 MG/DL — SIGNIFICANT CHANGE UP (ref 7–23)
BUN SERPL-MCNC: 13 MG/DL — SIGNIFICANT CHANGE UP (ref 7–23)
CA-I SERPL-SCNC: 1.1 MMOL/L — LOW (ref 1.12–1.3)
CALCIUM SERPL-MCNC: 8.9 MG/DL — SIGNIFICANT CHANGE UP (ref 8.4–10.5)
CALCIUM SERPL-MCNC: 9 MG/DL — SIGNIFICANT CHANGE UP (ref 8.4–10.5)
CHLORIDE BLDV-SCNC: 91 MMOL/L — LOW (ref 96–108)
CHLORIDE SERPL-SCNC: 89 MMOL/L — LOW (ref 96–108)
CHLORIDE SERPL-SCNC: 93 MMOL/L — LOW (ref 96–108)
CO2 BLDV-SCNC: 25 MMOL/L — SIGNIFICANT CHANGE UP (ref 22–30)
CO2 SERPL-SCNC: 21 MMOL/L — LOW (ref 22–31)
CO2 SERPL-SCNC: 24 MMOL/L — SIGNIFICANT CHANGE UP (ref 22–31)
CREAT SERPL-MCNC: 0.77 MG/DL — SIGNIFICANT CHANGE UP (ref 0.5–1.3)
CREAT SERPL-MCNC: 0.79 MG/DL — SIGNIFICANT CHANGE UP (ref 0.5–1.3)
CRP SERPL-MCNC: 0.52 MG/DL — HIGH (ref 0–0.4)
D DIMER BLD IA.RAPID-MCNC: 282 NG/ML DDU — HIGH
EOSINOPHIL # BLD AUTO: 0 K/UL — SIGNIFICANT CHANGE UP (ref 0–0.5)
EOSINOPHIL NFR BLD AUTO: 0 % — SIGNIFICANT CHANGE UP (ref 0–6)
GAS PNL BLDV: 121 MMOL/L — LOW (ref 135–145)
GAS PNL BLDV: SIGNIFICANT CHANGE UP
GLUCOSE BLDV-MCNC: 109 MG/DL — HIGH (ref 70–99)
GLUCOSE SERPL-MCNC: 109 MG/DL — HIGH (ref 70–99)
GLUCOSE SERPL-MCNC: 91 MG/DL — SIGNIFICANT CHANGE UP (ref 70–99)
HCO3 BLDV-SCNC: 24 MMOL/L — SIGNIFICANT CHANGE UP (ref 21–29)
HCT VFR BLD CALC: 39.7 % — SIGNIFICANT CHANGE UP (ref 34.5–45)
HCT VFR BLDA CALC: 45 % — SIGNIFICANT CHANGE UP (ref 39–50)
HGB BLD CALC-MCNC: 14.7 G/DL — SIGNIFICANT CHANGE UP (ref 11.5–15.5)
HGB BLD-MCNC: 14.3 G/DL — SIGNIFICANT CHANGE UP (ref 11.5–15.5)
IMM GRANULOCYTES NFR BLD AUTO: 0.2 % — SIGNIFICANT CHANGE UP (ref 0–1.5)
LACTATE BLDV-MCNC: 1.2 MMOL/L — SIGNIFICANT CHANGE UP (ref 0.7–2)
LDH SERPL L TO P-CCNC: 176 U/L — SIGNIFICANT CHANGE UP (ref 50–242)
LYMPHOCYTES # BLD AUTO: 1.37 K/UL — SIGNIFICANT CHANGE UP (ref 1–3.3)
LYMPHOCYTES # BLD AUTO: 25.9 % — SIGNIFICANT CHANGE UP (ref 13–44)
MCHC RBC-ENTMCNC: 30.5 PG — SIGNIFICANT CHANGE UP (ref 27–34)
MCHC RBC-ENTMCNC: 36 GM/DL — SIGNIFICANT CHANGE UP (ref 32–36)
MCV RBC AUTO: 84.6 FL — SIGNIFICANT CHANGE UP (ref 80–100)
MONOCYTES # BLD AUTO: 0.61 K/UL — SIGNIFICANT CHANGE UP (ref 0–0.9)
MONOCYTES NFR BLD AUTO: 11.6 % — SIGNIFICANT CHANGE UP (ref 2–14)
NEUTROPHILS # BLD AUTO: 3.28 K/UL — SIGNIFICANT CHANGE UP (ref 1.8–7.4)
NEUTROPHILS NFR BLD AUTO: 62.1 % — SIGNIFICANT CHANGE UP (ref 43–77)
NRBC # BLD: 0 /100 WBCS — SIGNIFICANT CHANGE UP (ref 0–0)
OSMOLALITY SERPL: 269 MOSMOL/KG — LOW (ref 280–301)
OTHER CELLS CSF MANUAL: 20 ML/DL — SIGNIFICANT CHANGE UP (ref 18–22)
PCO2 BLDV: 35 MMHG — SIGNIFICANT CHANGE UP (ref 35–50)
PH BLDV: 7.44 — SIGNIFICANT CHANGE UP (ref 7.35–7.45)
PLATELET # BLD AUTO: 189 K/UL — SIGNIFICANT CHANGE UP (ref 150–400)
PO2 BLDV: 79 MMHG — HIGH (ref 25–45)
POTASSIUM BLDV-SCNC: 5.1 MMOL/L — SIGNIFICANT CHANGE UP (ref 3.5–5.3)
POTASSIUM SERPL-MCNC: 4 MMOL/L — SIGNIFICANT CHANGE UP (ref 3.5–5.3)
POTASSIUM SERPL-MCNC: 4.4 MMOL/L — SIGNIFICANT CHANGE UP (ref 3.5–5.3)
POTASSIUM SERPL-SCNC: 4 MMOL/L — SIGNIFICANT CHANGE UP (ref 3.5–5.3)
POTASSIUM SERPL-SCNC: 4.4 MMOL/L — SIGNIFICANT CHANGE UP (ref 3.5–5.3)
PROCALCITONIN SERPL-MCNC: 0.08 NG/ML — SIGNIFICANT CHANGE UP (ref 0.02–0.1)
PROT SERPL-MCNC: 6.9 G/DL — SIGNIFICANT CHANGE UP (ref 6–8.3)
PROT SERPL-MCNC: 7.5 G/DL — SIGNIFICANT CHANGE UP (ref 6–8.3)
RAPID RVP RESULT: DETECTED
RBC # BLD: 4.69 M/UL — SIGNIFICANT CHANGE UP (ref 3.8–5.2)
RBC # FLD: 11.5 % — SIGNIFICANT CHANGE UP (ref 10.3–14.5)
SAO2 % BLDV: 96 % — HIGH (ref 67–88)
SARS-COV-2 RNA SPEC QL NAA+PROBE: DETECTED
SODIUM SERPL-SCNC: 123 MMOL/L — LOW (ref 135–145)
SODIUM SERPL-SCNC: 130 MMOL/L — LOW (ref 135–145)
WBC # BLD: 5.28 K/UL — SIGNIFICANT CHANGE UP (ref 3.8–10.5)
WBC # FLD AUTO: 5.28 K/UL — SIGNIFICANT CHANGE UP (ref 3.8–10.5)

## 2021-01-10 PROCEDURE — 99285 EMERGENCY DEPT VISIT HI MDM: CPT

## 2021-01-10 PROCEDURE — 71045 X-RAY EXAM CHEST 1 VIEW: CPT | Mod: 26

## 2021-01-10 RX ORDER — ATORVASTATIN CALCIUM 80 MG/1
1 TABLET, FILM COATED ORAL
Qty: 0 | Refills: 0 | DISCHARGE

## 2021-01-10 RX ORDER — REMDESIVIR 5 MG/ML
INJECTION INTRAVENOUS
Refills: 0 | Status: COMPLETED | OUTPATIENT
Start: 2021-01-11 | End: 2021-01-14

## 2021-01-10 RX ORDER — REMDESIVIR 5 MG/ML
100 INJECTION INTRAVENOUS EVERY 24 HOURS
Refills: 0 | Status: COMPLETED | OUTPATIENT
Start: 2021-01-11 | End: 2021-01-14

## 2021-01-10 RX ORDER — OMEPRAZOLE 10 MG/1
1 CAPSULE, DELAYED RELEASE ORAL
Qty: 0 | Refills: 0 | DISCHARGE

## 2021-01-10 RX ORDER — LOSARTAN POTASSIUM 100 MG/1
50 TABLET, FILM COATED ORAL DAILY
Refills: 0 | Status: DISCONTINUED | OUTPATIENT
Start: 2021-01-10 | End: 2021-01-14

## 2021-01-10 RX ORDER — SODIUM CHLORIDE 9 MG/ML
1000 INJECTION INTRAMUSCULAR; INTRAVENOUS; SUBCUTANEOUS
Refills: 0 | Status: DISCONTINUED | OUTPATIENT
Start: 2021-01-10 | End: 2021-01-11

## 2021-01-10 RX ORDER — REMDESIVIR 5 MG/ML
200 INJECTION INTRAVENOUS EVERY 24 HOURS
Refills: 0 | Status: COMPLETED | OUTPATIENT
Start: 2021-01-10 | End: 2021-01-10

## 2021-01-10 RX ORDER — METOPROLOL TARTRATE 50 MG
50 TABLET ORAL DAILY
Refills: 0 | Status: DISCONTINUED | OUTPATIENT
Start: 2021-01-10 | End: 2021-01-14

## 2021-01-10 RX ORDER — SODIUM CHLORIDE 9 MG/ML
1000 INJECTION INTRAMUSCULAR; INTRAVENOUS; SUBCUTANEOUS ONCE
Refills: 0 | Status: COMPLETED | OUTPATIENT
Start: 2021-01-10 | End: 2021-01-10

## 2021-01-10 RX ORDER — DEXAMETHASONE 0.5 MG/5ML
6 ELIXIR ORAL DAILY
Refills: 0 | Status: DISCONTINUED | OUTPATIENT
Start: 2021-01-10 | End: 2021-01-11

## 2021-01-10 RX ORDER — ENOXAPARIN SODIUM 100 MG/ML
40 INJECTION SUBCUTANEOUS DAILY
Refills: 0 | Status: DISCONTINUED | OUTPATIENT
Start: 2021-01-10 | End: 2021-01-14

## 2021-01-10 RX ORDER — ACETAMINOPHEN 500 MG
650 TABLET ORAL EVERY 6 HOURS
Refills: 0 | Status: DISCONTINUED | OUTPATIENT
Start: 2021-01-10 | End: 2021-01-14

## 2021-01-10 RX ADMIN — Medication 650 MILLIGRAM(S): at 18:25

## 2021-01-10 RX ADMIN — Medication 30 MILLILITER(S): at 11:14

## 2021-01-10 RX ADMIN — REMDESIVIR 200 MILLIGRAM(S): 5 INJECTION INTRAVENOUS at 21:14

## 2021-01-10 RX ADMIN — SODIUM CHLORIDE 50 MILLILITER(S): 9 INJECTION INTRAMUSCULAR; INTRAVENOUS; SUBCUTANEOUS at 21:14

## 2021-01-10 RX ADMIN — SODIUM CHLORIDE 1000 MILLILITER(S): 9 INJECTION INTRAMUSCULAR; INTRAVENOUS; SUBCUTANEOUS at 11:13

## 2021-01-10 NOTE — ED PROVIDER NOTE - PROGRESS NOTE DETAILS
Private Physician paged  Robert Otoole MD, Facep Dw Dr Thompson pt had colonoscopy three weeks ago, had concerns for covid when he saw her in office few days ago. If needs admit use full time hospitalist  Robert Otoole MD, Facep

## 2021-01-10 NOTE — H&P ADULT - HISTORY OF PRESENT ILLNESS
75 F with pmhx htn, COPD presents with cough x 1 week, nausea, vomiting and diarrhea x 1 day.   Pt states she has had cough x 1 week. States she saw her pulmonologist and was started on a course of amoxiillin. States has had minimal improvemennt. States she had a few episodes of vomiting this week and has been able to tolerate very little PO  Patient states that she has cough x 1 week, saw her pulmonologist was prescribed Amoxicillin, started to have nausea, vomiting, diarrhea- non bloody about 2-3 episodes which prompted her to come to ED. Associated poor appetite and poor oral intake. Patient lives alone, states that she shares apartment with 5-6 members who got tested positive for COVID.  No hx chest pain/ palpitations/ abdominal pain.     In the ed patient tested positive for COVID, received NS bolus

## 2021-01-10 NOTE — ED PROVIDER NOTE - ATTENDING CONTRIBUTION TO CARE
Private Physician Jay Limon |  75y female pmh copd, HLD,HTN,Renal colic, Sp hystrectomy, [place]. Pt comes to ed c/o shortness of breath, Diarrhea past week. Pt was seen by pmd and tx w amoxil for pulmonary infection, Subsequently developed diarreha nausea and vomiting without fever, dizziness without loc, abd pain. No sptum, cp, Private Physician Jay Limon |  75y female pmh copd, HLD,HTN,Renal colic, Sp hystrectomy, [place]. Pt comes to ed c/o shortness of breath, Diarrhea past week. Pt was seen by pmd and tx w amoxil for pulmonary infection, Subsequently developed diarrhea nausea and vomiting without fever, dizziness without loc, abd pain. No sptum, cp,palps. PE WDWN female awake alert normocephalic atraumatic neck supple chest clear anterior & posterior abd soft +bs no mass gurading rebound cvat, neuro gcs 15 speech fluent power 5/5 all extr pain light touch intact  Robert Otoole MD, Facep Private Physician Jay Braxton [phone]		  75y female pmh copd, HLD,HTN,Renal colic, Sp hystrectomy, [place]. Pt comes to ed c/o shortness of breath, Diarrhea past week. Pt was seen by pmd and tx w amoxil for pulmonary infection, Subsequently developed diarrhea nausea and vomiting without fever, dizziness without loc, abd pain. No sptum, cp,palps. PE WDWN female awake alert normocephalic atraumatic neck supple chest clear anterior & posterior abd soft +bs no mass gurading rebound cvat, neuro gcs 15 speech fluent power 5/5 all extr pain light touch intact  Robert Otoole MD, Facep

## 2021-01-10 NOTE — ED PROVIDER NOTE - NS ED ROS FT
REVIEW OF SYSTEMS:  General:  no fever, no chills  HEENT: no headache, no vision changes  Cardiac: no chest pain, no palpitations  Respiratory: +cough, no shortness of breath  Gastrointestinal: no abdominal pain, +nausea, +vomiting, +diarrhea  Genitourinary: no hematuria, no dysuria, no urinary frequency  Extremities: no extremity swelling, no extremity pain  Neuro: no focal weakness, no numbness/tingling of the extremities, no decreased sensation  Heme: no easy bleeding, no easy bruising  Skin: no jaundice,  no rashes, no lesions  All other ROS as documented in HPI  -Tyler Morris, PGY-3

## 2021-01-10 NOTE — ED ADULT NURSE NOTE - OBJECTIVE STATEMENT
Pt is a 75 year old female with PMH of COPD, HLD, HTN.  Pt presents to ED with c/o diarrhea x 7 days +nausea and vomiting with last emesis yesterday.  Pt alert and oriented x  3.  Pt denies any SOB or chest pain.  Respirations even and unlabored.  Pt 100 on room air.  Pt abdomen soft and non tender.  Pt skin warm dry and intact.

## 2021-01-10 NOTE — ED ADULT TRIAGE NOTE - CHIEF COMPLAINT QUOTE
generalized weakness/diarrhea/fever/dec appetite, started on amoxicillin for "chronic cough" by pulmonologist, hx COPD

## 2021-01-10 NOTE — H&P ADULT - MUSCULOSKELETAL
details… ROM intact/no joint swelling/no joint erythema/no calf tenderness/decreased ROM detailed exam

## 2021-01-10 NOTE — ED PROVIDER NOTE - OBJECTIVE STATEMENT
75F PMH HTN, HLD, COPD presents with cough. Pt states she has had cough x 1 week. States she saw her pulmonologist and was started on a course of amoxiillin. States has had minimal improvemennt. States she had a few episodes of vomiting this week and has been able to tolerate very little PO. Also states since yesterday having a few episodes of diarrhea. No abdominal pain. No fevers. No loss of smell or taste. No chest pain. No SOB.

## 2021-01-10 NOTE — ED PROVIDER NOTE - CLINICAL SUMMARY MEDICAL DECISION MAKING FREE TEXT BOX
75F presents with multiple sx. May be viral illness, possibly COVID, may also be side effects of amoxicilin with bronchitis. No SOB, and not in respiratory distress so little concern for COPD exacerbation. Will check CXR/UA, labs, RVP for signs of infection. Symptomatic control with meds. Otherwise pt well appearing and likely can dc with PMD f/u.

## 2021-01-10 NOTE — ED PROVIDER NOTE - PHYSICAL EXAMINATION
Physical Exam:  Gen: NAD, AOx3, non-toxic appearing, able to ambulate without assistance  Head: NCAT  HEENT: EOMI, PEERLA, normal conjunctiva, tongue midline, oral mucosa moist  Lung: CTAB, no respiratory distress, no wheezes/rhonchi/rales B/L, speaking in full sentences  CV: RRR, no murmurs, rubs or gallops  Abd: soft, NT, ND, no guarding, no rigidity, no rebound tenderness, no CVA tenderness   MSK: no visible deformities, ROM normal in UE/LE, no back pain  Neuro: No focal sensory or motor deficits  Skin: Warm, well perfused, no rash, no leg swelling  Psych: normal affect, calm  ~Tyler Morris M.D. PGY-3

## 2021-01-11 LAB
ALBUMIN SERPL ELPH-MCNC: 3.6 G/DL — SIGNIFICANT CHANGE UP (ref 3.3–5)
ALP SERPL-CCNC: 66 U/L — SIGNIFICANT CHANGE UP (ref 40–120)
ALT FLD-CCNC: 38 U/L — SIGNIFICANT CHANGE UP (ref 10–45)
ANION GAP SERPL CALC-SCNC: 12 MMOL/L — SIGNIFICANT CHANGE UP (ref 5–17)
APPEARANCE UR: CLEAR — SIGNIFICANT CHANGE UP
AST SERPL-CCNC: 50 U/L — HIGH (ref 10–40)
BACTERIA # UR AUTO: NEGATIVE — SIGNIFICANT CHANGE UP
BILIRUB DIRECT SERPL-MCNC: 0.1 MG/DL — SIGNIFICANT CHANGE UP (ref 0–0.2)
BILIRUB INDIRECT FLD-MCNC: 0.3 MG/DL — SIGNIFICANT CHANGE UP (ref 0.2–1)
BILIRUB SERPL-MCNC: 0.4 MG/DL — SIGNIFICANT CHANGE UP (ref 0.2–1.2)
BILIRUB UR-MCNC: NEGATIVE — SIGNIFICANT CHANGE UP
BUN SERPL-MCNC: 14 MG/DL — SIGNIFICANT CHANGE UP (ref 7–23)
CALCIUM SERPL-MCNC: 8.2 MG/DL — LOW (ref 8.4–10.5)
CHLORIDE SERPL-SCNC: 96 MMOL/L — SIGNIFICANT CHANGE UP (ref 96–108)
CO2 SERPL-SCNC: 23 MMOL/L — SIGNIFICANT CHANGE UP (ref 22–31)
COLOR SPEC: SIGNIFICANT CHANGE UP
CREAT SERPL-MCNC: 0.79 MG/DL — SIGNIFICANT CHANGE UP (ref 0.5–1.3)
DIFF PNL FLD: NEGATIVE — SIGNIFICANT CHANGE UP
EPI CELLS # UR: 0 /HPF — SIGNIFICANT CHANGE UP
FERRITIN SERPL-MCNC: 1388 NG/ML — HIGH (ref 15–150)
GLUCOSE SERPL-MCNC: 80 MG/DL — SIGNIFICANT CHANGE UP (ref 70–99)
GLUCOSE UR QL: NEGATIVE — SIGNIFICANT CHANGE UP
KETONES UR-MCNC: NEGATIVE — SIGNIFICANT CHANGE UP
LEUKOCYTE ESTERASE UR-ACNC: NEGATIVE — SIGNIFICANT CHANGE UP
NITRITE UR-MCNC: NEGATIVE — SIGNIFICANT CHANGE UP
OSMOLALITY SERPL: 270 MOSMOL/KG — LOW (ref 280–301)
PH UR: 6.5 — SIGNIFICANT CHANGE UP (ref 5–8)
POTASSIUM SERPL-MCNC: 3.6 MMOL/L — SIGNIFICANT CHANGE UP (ref 3.5–5.3)
POTASSIUM SERPL-SCNC: 3.6 MMOL/L — SIGNIFICANT CHANGE UP (ref 3.5–5.3)
PROT SERPL-MCNC: 6.6 G/DL — SIGNIFICANT CHANGE UP (ref 6–8.3)
PROT UR-MCNC: SIGNIFICANT CHANGE UP
RBC CASTS # UR COMP ASSIST: 1 /HPF — SIGNIFICANT CHANGE UP (ref 0–4)
SODIUM SERPL-SCNC: 131 MMOL/L — LOW (ref 135–145)
SODIUM UR-SCNC: <35 MMOL/L — SIGNIFICANT CHANGE UP
SP GR SPEC: 1.01 — SIGNIFICANT CHANGE UP (ref 1.01–1.02)
UROBILINOGEN FLD QL: NEGATIVE — SIGNIFICANT CHANGE UP
WBC UR QL: 0 /HPF — SIGNIFICANT CHANGE UP (ref 0–5)

## 2021-01-11 PROCEDURE — 99223 1ST HOSP IP/OBS HIGH 75: CPT

## 2021-01-11 RX ADMIN — Medication 50 MILLIGRAM(S): at 05:30

## 2021-01-11 RX ADMIN — ENOXAPARIN SODIUM 40 MILLIGRAM(S): 100 INJECTION SUBCUTANEOUS at 11:08

## 2021-01-11 RX ADMIN — Medication 6 MILLIGRAM(S): at 05:29

## 2021-01-11 RX ADMIN — REMDESIVIR 500 MILLIGRAM(S): 5 INJECTION INTRAVENOUS at 17:33

## 2021-01-11 RX ADMIN — LOSARTAN POTASSIUM 50 MILLIGRAM(S): 100 TABLET, FILM COATED ORAL at 05:29

## 2021-01-11 NOTE — CONSULT NOTE ADULT - ATTENDING COMMENTS
Alexander Isaac  Pager: [phone]. If no response or past 5 pm call [phone].    Will sign off, please call with questions.

## 2021-01-11 NOTE — CONSULT NOTE ADULT - ASSESSMENT
Patient states that she has cough x 1 week, saw her pulmonologist was prescribed Amoxicillin, started to have nausea, vomiting, diarrhea- non bloody about 2-3 episodes which prompted her to come to ED.    Fever, due to COVID-19 infection, not hypoxic, not on supplemental oxygen.     Plan:  c/w remdesivir  monitor LFT's and Cr while on remdesivir  given pt not on supplemental oxygen, does not meet criteria for dexamethasone, stopped.   trend markers of inflammation including ferritin, D-dimer, CRP periodically  pulm nodule on CXR, consider CT chest, w/u can be done as outpt   supportive care. 
75F PMH HTN, HLD, COPD presents with cough. Found to have COVID and hyponatremia    A/P:  Hyponatremia:  Na on admission 123  Etiology?  has recent h/o diarrhea/vomiting causing hypovolemia Vs SIADH sec to COVID/COPD  s/p 1L NS in the ER  Repeat Na level stat before further recommendation  Get BMP, Urine Na, osmolality, serum osmolality  Please call me with repeat Na level at [phone]    HTN:  Acceptable  Continue home meds at present    COVID+:  Getting steroid  Management per team

## 2021-01-11 NOTE — CONSULT NOTE ADULT - SUBJECTIVE AND OBJECTIVE BOX
Dr. Monson  Office [phone]  Cell [phone]  Gloria SPENCER  Cell [phone]    RENAL INITIAL CONSULT NOTE: DATE OF SERVICE 01-10-21 @ 17:43    HPI:  75F PMH HTN, HLD, COPD presents with cough. Pt states she has had cough x 1 week. States she saw her pulmonologist and was started on a course of amoxiillin. States has had minimal improvemennt. States she had a few episodes of vomiting this week and has been able to tolerate very little PO. Also states since yesterday having a few episodes of diarrhea. No abdominal pain. No fevers. No loss of smell or taste. No chest pain. No SOB. Found to have COVID and hyponatremia    Allergies:  Allergy Status Unknown  levofloxacin (Muscle Pain)      PAST MEDICAL & SURGICAL HISTORY:  Kidney stone    HLD (hyperlipidemia)    HTN (hypertension)    H/O: hysterectomy        Home Medications Reviewed    Hospital Medications:   MEDICATIONS  (STANDING):  dexAMETHasone  Injectable 6 milliGRAM(s) IV Push daily  losartan 50 milliGRAM(s) Oral daily  metoprolol succinate ER 50 milliGRAM(s) Oral daily      SOCIAL HISTORY:  Denies ETOh, Smoking,     FAMILY HISTORY:  No pertinent family history in first degree relatives        REVIEW OF SYSTEMS:  CONSTITUTIONAL: No weakness, fevers or chills  EYES/ENT: No visual changes;  No vertigo or throat pain   NECK: No pain or stiffness  RESPIRATORY: as per HPI  CARDIOVASCULAR: No chest pain or palpitations.  GASTROINTESTINAL: as per HPI  GENITOURINARY: No dysuria, frequency, foamy urine, urinary urgency, incontinence or hematuria  NEUROLOGICAL: No numbness or weakness  SKIN: No itching, burning, rashes, or lesions   VASCULAR: No bilateral lower extremity edema.   All other review of systems is negative unless indicated above.    VITALS:  T(F): 99.4 (01-10-21 @ 16:16), Max: 99.4 (01-10-21 @ 16:16)  HR: 73 (01-10-21 @ 16:16)  BP: 148/64 (01-10-21 @ 16:16)  RR: 18 (01-10-21 @ 16:16)  SpO2: 99% (01-10-21 @ 16:16)  Wt(kg): --    Height (cm): 152.4 (01-10 @ 10:12)  Weight (kg): 68 (01-10 @ 10:12)  BMI (kg/m2): 29.3 (01-10 @ 10:12)  BSA (m2): 1.65 (01-10 @ 10:12)    PHYSICAL EXAM:  Patient is COVID +  Please refer to ER exam finding       LABS:  01-10    123<L>  |  89<L>  |  13  ----------------------------<  109<H>  4.4   |  21<L>  |  0.77    Ca    8.9      10 Trevin 2021 11:15    TPro  7.5  /  Alb  4.0  /  TBili  0.5  /  DBili      /  AST  58<H>  /  ALT  38  /  AlkPhos  73  01-10    Creatinine Trend: 0.77 <--                        14.3   5.28  )-----------( 189      ( 10 Trevin 2021 11:15 )             39.7     Urine Studies:        RADIOLOGY & ADDITIONAL STUDIES:                
Patient is a 75y old  Female who presents with a chief complaint of     HPI:   Patient states that she has cough x 1 week, saw her pulmonologist was prescribed Amoxicillin, started to have nausea, vomiting, diarrhea- non bloody about 2-3 episodes which prompted her to come to ED. Associated poor appetite and poor oral intake. Patient lives alone, states that she shares apartment with 5-6 members who got tested positive for COVID.  No hx chest pain/ palpitations/ abdominal pain.   In the ed patient tested positive for COVID, received NS bolus (10 Trevin 2021 16:39)  Above reviewed:   Pt feels well, denies SOB now, cough improved, no diarrhea or urinary symptoms.       PAST MEDICAL & SURGICAL HISTORY:  Kidney stone    HLD (hyperlipidemia)    HTN (hypertension)    H/O: hysterectomy        REVIEW OF SYSTEMS    General: + Fevers     Skin: No rash  	  Ophthalmologic: Denies any discharge, redness.  	  ENT: No nasal congestion or throat pain.     Respiratory and Thorax: per hpi  	  Cardiovascular: No chest pain,     Gastrointestinal: No nausea, abdominal pain or diarrhea.    Genitourinary: No dysuria, frequency.    Musculoskeletal: No joint swelling    Neurological: No confusion. No extremity weakness.    Psychiatric: No hallucinations	    Extremities: No swelling     Endocrine: No polyuria or polydipsia     Allergic/Immunologic:	No hives          Social history:  Lives alone, no smoking, used to work as a nanny.       FAMILY HISTORY:  Mother: Lung cancer      Allergies  Allergy Status Unknown      Intolerances  levofloxacin (Muscle Pain)      Antimicrobials:  remdesivir  IVPB 100 milliGRAM(s) IV Intermittent every 24 hours      Vital Signs Last 24 Hrs  T(C): 37.1 (11 Jan 2021 12:43), Max: 38.3 (10 Trevin 2021 18:13)  T(F): 98.8 (11 Jan 2021 12:43), Max: 100.9 (10 Trevin 2021 18:13)  HR: 66 (11 Jan 2021 12:43) (66 - 84)  BP: 111/66 (11 Jan 2021 12:43) (111/66 - 135/75)  BP(mean): --  RR: 18 (11 Jan 2021 12:43) (18 - 18)  SpO2: 98% (11 Jan 2021 12:43) (95% - 98%)      PHYSICAL EXAM: Patient in no acute distress.    Constitutional: Comfortable. Awake and alert    Eyes: No discharge or conjunctival injection    ENT: No thrush. No pharyngeal exudate or erythema.    Neck: Supple,     Respiratory: + air entry bilaterally.    Cardiovascular: S1 S2 wnl, No murmurs.    Gastrointestinal: Soft BS(+) no tenderness, non distended.    Genitourinary: No CVA tenderness     Extremities: No edema.    Vascular: peripheral pulses felt    Neurological: AAO X 3. No grossly focal deficits.    Skin: No rash     Musculoskeletal: No joint swelling.    Psychiatric: Affect normal.                              14.3   5.28  )-----------( 189      ( 10 Trevin 2021 11:15 )             39.7       01-11    131<L>  |  96  |  14  ----------------------------<  80  3.6   |  23  |  0.79    Ca    8.2<L>      11 Jan 2021 07:23    TPro  6.6  /  Alb  3.6  /  TBili  0.4  /  DBili  0.1  /  AST  50<H>  /  ALT  38  /  AlkPhos  66  01-11      Respiratory Viral Panel with COVID-19 by RADHA (01.10.21 @ 11:15)   Rapid RVP Result: Detected   SARS-CoV-2: Detected:      Radiology: Imaging reviewed and visualized personally [ x]  < from: Xray Chest 1 View- PORTABLE-Urgent (Xray Chest 1 View- PORTABLE-Urgent .) (01.10.21 @ 11:58) >  IMPRESSION:    Interval development of a right lower lung nodule measuring approximately 9 mm. Further evaluation with CT may be performed if clinically warranted.  Lungs are otherwise clear.

## 2021-01-12 ENCOUNTER — TRANSCRIPTION ENCOUNTER (OUTPATIENT)
Age: 76
End: 2021-01-12

## 2021-01-12 LAB
ALBUMIN SERPL ELPH-MCNC: 3.7 G/DL — SIGNIFICANT CHANGE UP (ref 3.3–5)
ALP SERPL-CCNC: 65 U/L — SIGNIFICANT CHANGE UP (ref 40–120)
ALT FLD-CCNC: 33 U/L — SIGNIFICANT CHANGE UP (ref 10–45)
ANION GAP SERPL CALC-SCNC: 10 MMOL/L — SIGNIFICANT CHANGE UP (ref 5–17)
AST SERPL-CCNC: 37 U/L — SIGNIFICANT CHANGE UP (ref 10–40)
BILIRUB SERPL-MCNC: 0.3 MG/DL — SIGNIFICANT CHANGE UP (ref 0.2–1.2)
BUN SERPL-MCNC: 20 MG/DL — SIGNIFICANT CHANGE UP (ref 7–23)
CALCIUM SERPL-MCNC: 8.8 MG/DL — SIGNIFICANT CHANGE UP (ref 8.4–10.5)
CHLORIDE SERPL-SCNC: 104 MMOL/L — SIGNIFICANT CHANGE UP (ref 96–108)
CO2 SERPL-SCNC: 23 MMOL/L — SIGNIFICANT CHANGE UP (ref 22–31)
CREAT SERPL-MCNC: 0.84 MG/DL — SIGNIFICANT CHANGE UP (ref 0.5–1.3)
GLUCOSE SERPL-MCNC: 89 MG/DL — SIGNIFICANT CHANGE UP (ref 70–99)
HCT VFR BLD CALC: 37.2 % — SIGNIFICANT CHANGE UP (ref 34.5–45)
HGB BLD-MCNC: 12.9 G/DL — SIGNIFICANT CHANGE UP (ref 11.5–15.5)
MCHC RBC-ENTMCNC: 30.4 PG — SIGNIFICANT CHANGE UP (ref 27–34)
MCHC RBC-ENTMCNC: 34.7 GM/DL — SIGNIFICANT CHANGE UP (ref 32–36)
MCV RBC AUTO: 87.5 FL — SIGNIFICANT CHANGE UP (ref 80–100)
NRBC # BLD: 0 /100 WBCS — SIGNIFICANT CHANGE UP (ref 0–0)
OSMOLALITY UR: 310 MOSM/KG — SIGNIFICANT CHANGE UP (ref 50–1200)
PLATELET # BLD AUTO: 193 K/UL — SIGNIFICANT CHANGE UP (ref 150–400)
POTASSIUM SERPL-MCNC: 3.8 MMOL/L — SIGNIFICANT CHANGE UP (ref 3.5–5.3)
POTASSIUM SERPL-SCNC: 3.8 MMOL/L — SIGNIFICANT CHANGE UP (ref 3.5–5.3)
PROT SERPL-MCNC: 6.2 G/DL — SIGNIFICANT CHANGE UP (ref 6–8.3)
RBC # BLD: 4.25 M/UL — SIGNIFICANT CHANGE UP (ref 3.8–5.2)
RBC # FLD: 11.8 % — SIGNIFICANT CHANGE UP (ref 10.3–14.5)
SODIUM SERPL-SCNC: 137 MMOL/L — SIGNIFICANT CHANGE UP (ref 135–145)
WBC # BLD: 3.54 K/UL — LOW (ref 3.8–10.5)
WBC # FLD AUTO: 3.54 K/UL — LOW (ref 3.8–10.5)

## 2021-01-12 RX ADMIN — Medication 50 MILLIGRAM(S): at 05:31

## 2021-01-12 RX ADMIN — LOSARTAN POTASSIUM 50 MILLIGRAM(S): 100 TABLET, FILM COATED ORAL at 05:31

## 2021-01-12 RX ADMIN — ENOXAPARIN SODIUM 40 MILLIGRAM(S): 100 INJECTION SUBCUTANEOUS at 12:47

## 2021-01-12 RX ADMIN — REMDESIVIR 500 MILLIGRAM(S): 5 INJECTION INTRAVENOUS at 17:42

## 2021-01-12 NOTE — DISCHARGE NOTE PROVIDER - NSDCCPCAREPLAN_GEN_ALL_CORE_FT
PRINCIPAL DISCHARGE DIAGNOSIS  Diagnosis: COVID-19  Assessment and Plan of Treatment: Treated with Remdesivir and Decadron  Supportive Care  Good hand hygiene  Wear a mask  Avoid large gatherings  Quarantine as directed   Follow-up with your Primary Care Doctor      SECONDARY DISCHARGE DIAGNOSES  Diagnosis: Hyponatremia  Assessment and Plan of Treatment: Resolved

## 2021-01-12 NOTE — DISCHARGE NOTE PROVIDER - CARE PROVIDER_API CALL
Marija Garnica)  Internal Medicine  [address], [address]st [place] [address]  Phone: [phone]  Fax: [phone]  Follow Up Time:    Marija Garnica (MD)  Internal Medicine  [address], [address]st [place] [address]  Phone: [phone]  Fax: [phone]  Follow Up Time:     Nolan Frank (DO)  Nephrology  [address] [address] [place] [address], [address] [place] [address]  Phone: [phone]  Fax: [phone]  Follow Up Time:

## 2021-01-12 NOTE — DISCHARGE NOTE PROVIDER - NSDCMRMEDTOKEN_GEN_ALL_CORE_FT
Advair Diskus 250 mcg-50 mcg inhalation powder: 1 puff(s) inhaled 2 times a day  atorvastatin 20 mg oral tablet: 1 tab(s) orally once a day (at bedtime)  Azithromycin 5 Day Dose Pack 250 mg oral tablet: 2 tabs on day 1, then 1 tab for 4 days  metoprolol succinate 50 mg oral tablet, extended release: 1 tab(s) orally once a day  omeprazole 40 mg oral delayed release capsule: 1 cap(s) orally once a day   Advair Diskus 250 mcg-50 mcg inhalation powder: 1 puff(s) inhaled 2 times a day  atorvastatin 20 mg oral tablet: 1 tab(s) orally once a day (at bedtime)  losartan 50 mg oral tablet: 1 tab(s) orally once a day  metoprolol succinate 50 mg oral tablet, extended release: 1 tab(s) orally once a day  omeprazole 40 mg oral delayed release capsule: 1 cap(s) orally once a day

## 2021-01-12 NOTE — DISCHARGE NOTE PROVIDER - PROVIDER TOKENS
PROVIDER:[TOKEN:[[id number]]] PROVIDER:[TOKEN:[[id number]]],PROVIDER:[TOKEN:[[id number]]]

## 2021-01-12 NOTE — DISCHARGE NOTE PROVIDER - HOSPITAL COURSE
74 y/o F with PMHx of HTN, HLD, COPD who presents with cough x 1 week. She recently saw her pulmonologist who started her on Amoxicillin then developed, N/V/D.   Patient lives alone, states that she shares apartment with 5-6 members who got tested positive for COVID.  No hx chest pain/ palpitations/ abdominal pain.     In the ed patient tested positive for COVID, received NS bolus.   Started on Remdesivir (1/11 - and Decadron, later discontinued as pt was not hypoxic requiring supplemental oxygenation.   Pt also noted to be hyponatremic, now resolved s/p NS fluids.     Pt is medically stable for discharge home.

## 2021-01-13 ENCOUNTER — TRANSCRIPTION ENCOUNTER (OUTPATIENT)
Age: 76
End: 2021-01-13

## 2021-01-13 LAB
ALBUMIN SERPL ELPH-MCNC: 4 G/DL — SIGNIFICANT CHANGE UP (ref 3.3–5)
ALP SERPL-CCNC: 74 U/L — SIGNIFICANT CHANGE UP (ref 40–120)
ALT FLD-CCNC: 30 U/L — SIGNIFICANT CHANGE UP (ref 10–45)
ANION GAP SERPL CALC-SCNC: 11 MMOL/L — SIGNIFICANT CHANGE UP (ref 5–17)
AST SERPL-CCNC: 32 U/L — SIGNIFICANT CHANGE UP (ref 10–40)
BILIRUB SERPL-MCNC: 0.4 MG/DL — SIGNIFICANT CHANGE UP (ref 0.2–1.2)
BUN SERPL-MCNC: 18 MG/DL — SIGNIFICANT CHANGE UP (ref 7–23)
CALCIUM SERPL-MCNC: 9.1 MG/DL — SIGNIFICANT CHANGE UP (ref 8.4–10.5)
CHLORIDE SERPL-SCNC: 103 MMOL/L — SIGNIFICANT CHANGE UP (ref 96–108)
CO2 SERPL-SCNC: 23 MMOL/L — SIGNIFICANT CHANGE UP (ref 22–31)
CREAT SERPL-MCNC: 0.84 MG/DL — SIGNIFICANT CHANGE UP (ref 0.5–1.3)
GLUCOSE SERPL-MCNC: 88 MG/DL — SIGNIFICANT CHANGE UP (ref 70–99)
HCT VFR BLD CALC: 40.1 % — SIGNIFICANT CHANGE UP (ref 34.5–45)
HGB BLD-MCNC: 13.7 G/DL — SIGNIFICANT CHANGE UP (ref 11.5–15.5)
MCHC RBC-ENTMCNC: 30.2 PG — SIGNIFICANT CHANGE UP (ref 27–34)
MCHC RBC-ENTMCNC: 34.2 GM/DL — SIGNIFICANT CHANGE UP (ref 32–36)
MCV RBC AUTO: 88.5 FL — SIGNIFICANT CHANGE UP (ref 80–100)
NRBC # BLD: 0 /100 WBCS — SIGNIFICANT CHANGE UP (ref 0–0)
PLATELET # BLD AUTO: 221 K/UL — SIGNIFICANT CHANGE UP (ref 150–400)
POTASSIUM SERPL-MCNC: 4 MMOL/L — SIGNIFICANT CHANGE UP (ref 3.5–5.3)
POTASSIUM SERPL-SCNC: 4 MMOL/L — SIGNIFICANT CHANGE UP (ref 3.5–5.3)
PROT SERPL-MCNC: 6.6 G/DL — SIGNIFICANT CHANGE UP (ref 6–8.3)
RBC # BLD: 4.53 M/UL — SIGNIFICANT CHANGE UP (ref 3.8–5.2)
RBC # FLD: 12 % — SIGNIFICANT CHANGE UP (ref 10.3–14.5)
SARS-COV-2 IGG SERPL QL IA: NEGATIVE — SIGNIFICANT CHANGE UP
SARS-COV-2 IGM SERPL IA-ACNC: <0.1 INDEX — SIGNIFICANT CHANGE UP
SODIUM SERPL-SCNC: 137 MMOL/L — SIGNIFICANT CHANGE UP (ref 135–145)
WBC # BLD: 6.79 K/UL — SIGNIFICANT CHANGE UP (ref 3.8–10.5)
WBC # FLD AUTO: 6.79 K/UL — SIGNIFICANT CHANGE UP (ref 3.8–10.5)

## 2021-01-13 RX ORDER — LOSARTAN POTASSIUM 100 MG/1
1 TABLET, FILM COATED ORAL
Qty: 30 | Refills: 0
Start: 2021-01-13 | End: 2021-02-11

## 2021-01-13 RX ADMIN — Medication 50 MILLIGRAM(S): at 04:35

## 2021-01-13 RX ADMIN — ENOXAPARIN SODIUM 40 MILLIGRAM(S): 100 INJECTION SUBCUTANEOUS at 11:57

## 2021-01-13 RX ADMIN — LOSARTAN POTASSIUM 50 MILLIGRAM(S): 100 TABLET, FILM COATED ORAL at 04:35

## 2021-01-13 RX ADMIN — REMDESIVIR 500 MILLIGRAM(S): 5 INJECTION INTRAVENOUS at 17:06

## 2021-01-13 NOTE — DISCHARGE NOTE NURSING/CASE MANAGEMENT/SOCIAL WORK - NSDCCRNAME_GEN_ALL_CORE_FT
[facility]: [place] [address] [place] [address] room 614

## 2021-01-13 NOTE — CHART NOTE - NSCHARTNOTEFT_GEN_A_CORE
Discharge Note:    Requested by Dr. Suero to facilitate d/c home. V/s, labs, diagnostics reviewed, pt stable to d/c now. Medication reconciliation reviewed, revised, and resolved with Dr. Sánchez who medically cleared w/ f/u as directed. Please refer to d/c note for hospital details.     Alice Merino, NORBERTO-c  [phone]

## 2021-01-13 NOTE — PROGRESS NOTE ADULT - PROBLEM SELECTOR PLAN 2
Likely from poor intake and diarrhea. Improving. Serum Osmolality, gentle hydration. Renal consult.
Likely from poor intake and diarrhea. Improving. Serum Osmolality, gentle hydration. Renal following.
Likely from poor intake and diarrhea. Improving. Serum Osmolality, gentle hydration. Renal following.

## 2021-01-13 NOTE — PROGRESS NOTE ADULT - PROBLEM SELECTOR PLAN 1
Continue with Remdesivir   ID consult appreciated.
Continue with Remdesivir   ID following.   D/C planning.
Continue with Remdesivir   ID following.   D/C planning.

## 2021-01-13 NOTE — CHART NOTE - NSCHARTNOTEFT_GEN_A_CORE
Discharge Note:    Requested by Dr. Suero to facilitate d/c home. V/s, labs, diagnostics reviewed, pt stable to d/c now. Medication reconciliation reviewed, revised, and resolved with Dr. Sánchez who medically cleared w/ f/u as directed.   Please refer to d/c note for hospital details.    Alice Merino, NORBERTO-c  [phone]

## 2021-01-13 NOTE — DISCHARGE NOTE NURSING/CASE MANAGEMENT/SOCIAL WORK - PATIENT PORTAL LINK FT
You can access the FollowMyHealth Patient Portal offered by [facility] by registering at the following website: [email or web address]. By joining [employer]’s FollowMyHealth portal, you will also be able to view your health information using other applications (apps) compatible with our system.

## 2021-01-13 NOTE — ED PROVIDER NOTE - PSH
Azithromycin Pregnancy And Lactation Text: This medication is considered safe during pregnancy and is also secreted in breast milk. Topical Retinoid Pregnancy And Lactation Text: This medication is Pregnancy Category C. It is unknown if this medication is excreted in breast milk. High Dose Vitamin A Counseling: Side effects reviewed, pt to contact office should one occur. Sarecycline Counseling: Patient advised regarding possible photosensitivity and discoloration of the teeth, skin, lips, tongue and gums.  Patient instructed to avoid sunlight, if possible.  When exposed to sunlight, patients should wear protective clothing, sunglasses, and sunscreen.  The patient was instructed to call the office immediately if the following severe adverse effects occur:  hearing changes, easy bruising/bleeding, severe headache, or vision changes.  The patient verbalized understanding of the proper use and possible adverse effects of sarecycline.  All of the patient's questions and concerns were addressed. Birth Control Pills Pregnancy And Lactation Text: This medication should be avoided if pregnant and for the first 30 days post-partum. Topical Clindamycin Pregnancy And Lactation Text: This medication is Pregnancy Category B and is considered safe during pregnancy. It is unknown if it is excreted in breast milk. Doxycycline Pregnancy And Lactation Text: This medication is Pregnancy Category D and not consider safe during pregnancy. It is also excreted in breast milk but is considered safe for shorter treatment courses. Use Enhanced Medication Counseling?: No Tetracycline Counseling: Patient counseled regarding possible photosensitivity and increased risk for sunburn.  Patient instructed to avoid sunlight, if possible.  When exposed to sunlight, patients should wear protective clothing, sunglasses, and sunscreen.  The patient was instructed to call the office immediately if the following severe adverse effects occur:  hearing changes, easy bruising/bleeding, severe headache, or vision changes.  The patient verbalized understanding of the proper use and possible adverse effects of tetracycline.  All of the patient's questions and concerns were addressed. Patient understands to avoid pregnancy while on therapy due to potential birth defects. Azithromycin Counseling:  I discussed with the patient the risks of azithromycin including but not limited to GI upset, allergic reaction, drug rash, diarrhea, and yeast infections. Topical Retinoid counseling:  Patient advised to apply a pea-sized amount only at bedtime and wait 30 minutes after washing their face before applying.  If too drying, patient may add a non-comedogenic moisturizer. The patient verbalized understanding of the proper use and possible adverse effects of retinoids.  All of the patient's questions and concerns were addressed. Isotretinoin Pregnancy And Lactation Text: This medication is Pregnancy Category X and is considered extremely dangerous during pregnancy. It is unknown if it is excreted in breast milk. Birth Control Pills Counseling: Birth Control Pill Counseling: I discussed with the patient the potential side effects of OCPs including but not limited to increased risk of stroke, heart attack, thrombophlebitis, deep venous thrombosis, hepatic adenomas, breast changes, GI upset, headaches, and depression.  The patient verbalized understanding of the proper use and possible adverse effects of OCPs. All of the patient's questions and concerns were addressed. H/O: hysterectomy Topical Clindamycin Counseling: Patient counseled that this medication may cause skin irritation or allergic reactions.  In the event of skin irritation, the patient was advised to reduce the amount of the drug applied or use it less frequently.   The patient verbalized understanding of the proper use and possible adverse effects of clindamycin.  All of the patient's questions and concerns were addressed. Detail Level: Zone Doxycycline Counseling:  Patient counseled regarding possible photosensitivity and increased risk for sunburn.  Patient instructed to avoid sunlight, if possible.  When exposed to sunlight, patients should wear protective clothing, sunglasses, and sunscreen.  The patient was instructed to call the office immediately if the following severe adverse effects occur:  hearing changes, easy bruising/bleeding, severe headache, or vision changes.  The patient verbalized understanding of the proper use and possible adverse effects of doxycycline.  All of the patient's questions and concerns were addressed. Minocycline Pregnancy And Lactation Text: This medication is Pregnancy Category D and not consider safe during pregnancy. It is also excreted in breast milk. Spironolactone Pregnancy And Lactation Text: This medication can cause feminization of the male fetus and should be avoided during pregnancy. The active metabolite is also found in breast milk. Benzoyl Peroxide Pregnancy And Lactation Text: This medication is Pregnancy Category C. It is unknown if benzoyl peroxide is excreted in breast milk. Tazorac Pregnancy And Lactation Text: This medication is not safe during pregnancy. It is unknown if this medication is excreted in breast milk. Isotretinoin Counseling: Patient should get monthly blood tests, not donate blood, not drive at night if vision affected, not share medication, and not undergo elective surgery for 6 months after tx completed. Side effects reviewed, pt to contact office should one occur. Bactrim Pregnancy And Lactation Text: This medication is Pregnancy Category D and is known to cause fetal risk.  It is also excreted in breast milk. Topical Sulfur Applications Pregnancy And Lactation Text: This medication is Pregnancy Category C and has an unknown safety profile during pregnancy. It is unknown if this topical medication is excreted in breast milk. Minocycline Counseling: Patient advised regarding possible photosensitivity and discoloration of the teeth, skin, lips, tongue and gums.  Patient instructed to avoid sunlight, if possible.  When exposed to sunlight, patients should wear protective clothing, sunglasses, and sunscreen.  The patient was instructed to call the office immediately if the following severe adverse effects occur:  hearing changes, easy bruising/bleeding, severe headache, or vision changes.  The patient verbalized understanding of the proper use and possible adverse effects of minocycline.  All of the patient's questions and concerns were addressed. Benzoyl Peroxide Counseling: Patient counseled that medicine may cause skin irritation and bleach clothing.  In the event of skin irritation, the patient was advised to reduce the amount of the drug applied or use it less frequently.   The patient verbalized understanding of the proper use and possible adverse effects of benzoyl peroxide.  All of the patient's questions and concerns were addressed. Spironolactone Counseling: Patient advised regarding risks of diarrhea, abdominal pain, hyperkalemia, birth defects (for female patients), liver toxicity and renal toxicity. The patient may need blood work to monitor liver and kidney function and potassium levels while on therapy. The patient verbalized understanding of the proper use and possible adverse effects of spironolactone.  All of the patient's questions and concerns were addressed. Dapsone Pregnancy And Lactation Text: This medication is Pregnancy Category C and is not considered safe during pregnancy or breast feeding. Tazorac Counseling:  Patient advised that medication is irritating and drying.  Patient may need to apply sparingly and wash off after an hour before eventually leaving it on overnight.  The patient verbalized understanding of the proper use and possible adverse effects of tazorac.  All of the patient's questions and concerns were addressed. Erythromycin Pregnancy And Lactation Text: This medication is Pregnancy Category B and is considered safe during pregnancy. It is also excreted in breast milk. Topical Sulfur Applications Counseling: Topical Sulfur Counseling: Patient counseled that this medication may cause skin irritation or allergic reactions.  In the event of skin irritation, the patient was advised to reduce the amount of the drug applied or use it less frequently.   The patient verbalized understanding of the proper use and possible adverse effects of topical sulfur application.  All of the patient's questions and concerns were addressed. High Dose Vitamin A Pregnancy And Lactation Text: High dose vitamin A therapy is contraindicated during pregnancy and breast feeding. Bactrim Counseling:  I discussed with the patient the risks of sulfa antibiotics including but not limited to GI upset, allergic reaction, drug rash, diarrhea, dizziness, photosensitivity, and yeast infections.  Rarely, more serious reactions can occur including but not limited to aplastic anemia, agranulocytosis, methemoglobinemia, blood dyscrasias, liver or kidney failure, lung infiltrates or desquamative/blistering drug rashes. Dapsone Counseling: I discussed with the patient the risks of dapsone including but not limited to hemolytic anemia, agranulocytosis, rashes, methemoglobinemia, kidney failure, peripheral neuropathy, headaches, GI upset, and liver toxicity.  Patients who start dapsone require monitoring including baseline LFTs and weekly CBCs for the first month, then every month thereafter.  The patient verbalized understanding of the proper use and possible adverse effects of dapsone.  All of the patient's questions and concerns were addressed. Erythromycin Counseling:  I discussed with the patient the risks of erythromycin including but not limited to GI upset, allergic reaction, drug rash, diarrhea, increase in liver enzymes, and yeast infections.

## 2021-01-14 VITALS
TEMPERATURE: 99 F | HEART RATE: 68 BPM | OXYGEN SATURATION: 97 % | RESPIRATION RATE: 18 BRPM | SYSTOLIC BLOOD PRESSURE: 158 MMHG | DIASTOLIC BLOOD PRESSURE: 66 MMHG

## 2021-01-14 LAB
ALBUMIN SERPL ELPH-MCNC: 3.4 G/DL — SIGNIFICANT CHANGE UP (ref 3.3–5)
ALP SERPL-CCNC: 73 U/L — SIGNIFICANT CHANGE UP (ref 40–120)
ALT FLD-CCNC: 24 U/L — SIGNIFICANT CHANGE UP (ref 10–45)
ANION GAP SERPL CALC-SCNC: 10 MMOL/L — SIGNIFICANT CHANGE UP (ref 5–17)
AST SERPL-CCNC: 25 U/L — SIGNIFICANT CHANGE UP (ref 10–40)
BILIRUB SERPL-MCNC: 0.6 MG/DL — SIGNIFICANT CHANGE UP (ref 0.2–1.2)
BUN SERPL-MCNC: 16 MG/DL — SIGNIFICANT CHANGE UP (ref 7–23)
CALCIUM SERPL-MCNC: 9.1 MG/DL — SIGNIFICANT CHANGE UP (ref 8.4–10.5)
CHLORIDE SERPL-SCNC: 103 MMOL/L — SIGNIFICANT CHANGE UP (ref 96–108)
CO2 SERPL-SCNC: 24 MMOL/L — SIGNIFICANT CHANGE UP (ref 22–31)
CREAT SERPL-MCNC: 0.77 MG/DL — SIGNIFICANT CHANGE UP (ref 0.5–1.3)
GLUCOSE SERPL-MCNC: 88 MG/DL — SIGNIFICANT CHANGE UP (ref 70–99)
HCT VFR BLD CALC: 38.3 % — SIGNIFICANT CHANGE UP (ref 34.5–45)
HGB BLD-MCNC: 13.2 G/DL — SIGNIFICANT CHANGE UP (ref 11.5–15.5)
MCHC RBC-ENTMCNC: 30.3 PG — SIGNIFICANT CHANGE UP (ref 27–34)
MCHC RBC-ENTMCNC: 34.5 GM/DL — SIGNIFICANT CHANGE UP (ref 32–36)
MCV RBC AUTO: 88 FL — SIGNIFICANT CHANGE UP (ref 80–100)
NRBC # BLD: 0 /100 WBCS — SIGNIFICANT CHANGE UP (ref 0–0)
PLATELET # BLD AUTO: 230 K/UL — SIGNIFICANT CHANGE UP (ref 150–400)
POTASSIUM SERPL-MCNC: 3.9 MMOL/L — SIGNIFICANT CHANGE UP (ref 3.5–5.3)
POTASSIUM SERPL-SCNC: 3.9 MMOL/L — SIGNIFICANT CHANGE UP (ref 3.5–5.3)
PROT SERPL-MCNC: 6.2 G/DL — SIGNIFICANT CHANGE UP (ref 6–8.3)
RBC # BLD: 4.35 M/UL — SIGNIFICANT CHANGE UP (ref 3.8–5.2)
RBC # FLD: 11.7 % — SIGNIFICANT CHANGE UP (ref 10.3–14.5)
SODIUM SERPL-SCNC: 137 MMOL/L — SIGNIFICANT CHANGE UP (ref 135–145)
WBC # BLD: 6.89 K/UL — SIGNIFICANT CHANGE UP (ref 3.8–10.5)
WBC # FLD AUTO: 6.89 K/UL — SIGNIFICANT CHANGE UP (ref 3.8–10.5)

## 2021-01-14 RX ORDER — LOSARTAN POTASSIUM 100 MG/1
1 TABLET, FILM COATED ORAL
Qty: 30 | Refills: 0
Start: 2021-01-14 | End: 2021-02-12

## 2021-01-14 RX ORDER — ONDANSETRON 8 MG/1
4 TABLET, FILM COATED ORAL ONCE
Refills: 0 | Status: COMPLETED | OUTPATIENT
Start: 2021-01-14 | End: 2021-01-14

## 2021-01-14 RX ADMIN — LOSARTAN POTASSIUM 50 MILLIGRAM(S): 100 TABLET, FILM COATED ORAL at 05:10

## 2021-01-14 RX ADMIN — Medication 50 MILLIGRAM(S): at 05:10

## 2021-01-14 RX ADMIN — REMDESIVIR 500 MILLIGRAM(S): 5 INJECTION INTRAVENOUS at 17:11

## 2021-01-14 RX ADMIN — ENOXAPARIN SODIUM 40 MILLIGRAM(S): 100 INJECTION SUBCUTANEOUS at 12:23

## 2021-01-14 RX ADMIN — ONDANSETRON 4 MILLIGRAM(S): 8 TABLET, FILM COATED ORAL at 01:54

## 2021-01-14 NOTE — PROGRESS NOTE ADULT - ASSESSMENT
75F PMH HTN, HLD, COPD presents with cough. Found to have COVID and hyponatremia    A/P:  Hyponatremia:  Na on admission 123  Etiology?  has recent h/o diarrhea/vomiting causing hypovolemia   urine studies consistent with hypovolemia   s/p 1L NS in the ER  Serum sodium improved to 131, Pt self corrected further to 137   Hold further IVF   continue BMP daily     HTN:  controlled  Continue current meds     COVID+:  Getting steroid/ remdesivir   Management per team
75F PMH HTN, HLD, COPD presents with cough. Found to have COVID and hyponatremia    A/P:  Hyponatremia:  Na on admission 123  Etiology?  has recent h/o diarrhea/vomiting causing hypovolemia Vs SIADH sec to COVID/COPD  s/p 1L NS in the ER  Serum sodium improved to 131 today s/p IVF. will hold further IVF as we have reach the goal for 24 hours   continue BMP daily   Check Urine Na, urine osmolality, serum osmolality    HTN:  controlled  Continue current meds     COVID+:  Getting steroid/ remdesivir   Management per team
75F PMH HTN, HLD, COPD presents with cough. Found to have COVID and hyponatremia    A/P:  Hyponatremia:  Na on admission 123  Etiology?  has recent h/o diarrhea/vomiting causing hypovolemia   urine studies consistent with hypovolemia   s/p 1L NS in the ER  Serum sodium improved to 131, Pt self corrected further to 137   Hold further IVF   serum sodium stable today   continue BMP daily     HTN:  controlled  Continue current meds     COVID+:  Getting steroid/ remdesivir   Management per team
75F PMH HTN, HLD, COPD presents with cough. Found to have COVID and hyponatremia    A/P:  Hyponatremia:  Na on admission 123  Etiology?  has recent h/o diarrhea/vomiting causing hypovolemia   urine studies consistent with hypovolemia   s/p 1L NS in the ER  Serum sodium improved to 131, Pt self corrected further to 137   Hold further IVF   serum sodium remains stable   continue BMP daily     HTN:  controlled  Continue current meds     COVID+:  Getting steroid/ remdesivir   Management per team

## 2021-01-14 NOTE — PROGRESS NOTE ADULT - SUBJECTIVE AND OBJECTIVE BOX
[facility] [place] [facility]   MD Augustus Rees MD, D.O. Ruoru Wong, PA    From 7 AM - 5 PM:  OFFICE: [phone]  Dr. Monson cell: [phone]  Dr. Villegas cell: [phone]  Dr. Frank cell: [phone]  JOHANNA Ricci cell: [phone]    From 5 PM - 7 AM: Answering Service: [phone]  Date of service: 01-11-21 @ 09:50    RENAL FOLLOW UP NOTE  --------------------------------------------------------------------------------  HPI:  Pt covid+     PAST HISTORY  --------------------------------------------------------------------------------  No significant changes to PMH, PSH, FHx, SHx, unless otherwise noted    ALLERGIES & MEDICATIONS  --------------------------------------------------------------------------------  Allergies    Allergy Status Unknown    Intolerances    levofloxacin (Muscle Pain)    Standing Inpatient Medications  dexAMETHasone  Injectable 6 milliGRAM(s) IV Push daily  enoxaparin Injectable 40 milliGRAM(s) SubCutaneous daily  losartan 50 milliGRAM(s) Oral daily  metoprolol succinate ER 50 milliGRAM(s) Oral daily  remdesivir  IVPB   IV Intermittent   remdesivir  IVPB 100 milliGRAM(s) IV Intermittent every 24 hours    PRN Inpatient Medications  acetaminophen   Tablet .. 650 milliGRAM(s) Oral every 6 hours PRN      REVIEW OF SYSTEMS  --------------------------------------------------------------------------------  unable to obtain     VITALS/PHYSICAL EXAM  --------------------------------------------------------------------------------  T(C): 37.2 (01-11-21 @ 05:05), Max: 38.3 (01-10-21 @ 16:39)  HR: 84 (01-11-21 @ 05:05) (70 - 84)  BP: 135/75 (01-11-21 @ 05:05) (119/63 - 148/64)  RR: 18 (01-11-21 @ 05:05) (18 - 22)  SpO2: 95% (01-11-21 @ 05:05) (95% - 100%)  Wt(kg): --  Height (cm): 152.4 (01-10-21 @ 16:39)  Weight (kg): 68 (01-10-21 @ 16:39)  BMI (kg/m2): 29.3 (01-10-21 @ 16:39)  BSA (m2): 1.65 (01-10-21 @ 16:39)      01-10-21 @ 07:01  -  01-11-21 @ 07:00  --------------------------------------------------------  IN: 1090 mL / OUT: 250 mL / NET: 840 mL    01-11-21 @ 07:01  -  01-11-21 @ 09:50  --------------------------------------------------------  IN: 240 mL / OUT: 0 mL / NET: 240 mL    LABS/STUDIES  --------------------------------------------------------------------------------              14.3   5.28  >-----------<  189      [01-10-21 @ 11:15]              39.7     131  |  96  |  14  ----------------------------<  80      [01-11-21 @ 07:23]  3.6   |  23  |  0.79        Ca     8.2     [01-11-21 @ 07:23]    TPro  6.6  /  Alb  3.6  /  TBili  0.4  /  DBili  0.1  /  AST  50  /  ALT  38  /  AlkPhos  66  [01-11-21 @ 07:23]          [01-10-21 @ 18:18]  Serum Osmolality 270      [01-11-21 @ 07:12]    Creatinine Trend:  SCr 0.79 [01-11 @ 07:23]  SCr 0.79 [01-10 @ 18:18]  SCr 0.77 [01-10 @ 11:15]    Ferritin 1388      [01-10-21 @ 21:53]      
[facility] [place] [facility]   MD Augustus Rees MD, D.O. Ruoru Wong, PA    From 7 AM - 5 PM:  OFFICE: [phone]  Dr. Monson cell: [phone]  Dr. Villegas cell: [phone]  Dr. Frank cell: [phone]  JOHANNA Ricci cell: [phone]    From 5 PM - 7 AM: Answering Service: [phone]  Date of service: 01-14-21 @ 11:30    RENAL FOLLOW UP NOTE  --------------------------------------------------------------------------------  HPI:  Pt covid +    PAST HISTORY  --------------------------------------------------------------------------------  No significant changes to PMH, PSH, FHx, SHx, unless otherwise noted    ALLERGIES & MEDICATIONS  --------------------------------------------------------------------------------  Allergies    Allergy Status Unknown    Intolerances    levofloxacin (Muscle Pain)    Standing Inpatient Medications  enoxaparin Injectable 40 milliGRAM(s) SubCutaneous daily  losartan 50 milliGRAM(s) Oral daily  metoprolol succinate ER 50 milliGRAM(s) Oral daily  remdesivir  IVPB   IV Intermittent   remdesivir  IVPB 100 milliGRAM(s) IV Intermittent every 24 hours    PRN Inpatient Medications  acetaminophen   Tablet .. 650 milliGRAM(s) Oral every 6 hours PRN      REVIEW OF SYSTEMS  --------------------------------------------------------------------------------  unable to obtain     VITALS/PHYSICAL EXAM  --------------------------------------------------------------------------------  T(C): 37.3 (01-14-21 @ 05:11), Max: 37.3 (01-14-21 @ 05:11)  HR: 76 (01-14-21 @ 05:11) (62 - 76)  BP: 110/61 (01-14-21 @ 05:11) (110/61 - 152/64)  RR: 18 (01-14-21 @ 05:11) (18 - 18)  SpO2: 95% (01-14-21 @ 05:11) (95% - 96%)  Wt(kg): --        01-13-21 @ 07:01  -  01-14-21 @ 07:00  --------------------------------------------------------  IN: 1200 mL / OUT: 0 mL / NET: 1200 mL    01-14-21 @ 07:01  -  01-14-21 @ 11:30  --------------------------------------------------------  IN: 240 mL / OUT: 0 mL / NET: 240 mL      LABS/STUDIES  --------------------------------------------------------------------------------              13.2   6.89  >-----------<  230      [01-14-21 @ 06:35]              38.3     137  |  103  |  16  ----------------------------<  88      [01-14-21 @ 06:33]  3.9   |  24  |  0.77        Ca     9.1     [01-14-21 @ 06:33]    TPro  6.2  /  Alb  3.4  /  TBili  0.6  /  DBili  x   /  AST  25  /  ALT  24  /  AlkPhos  73  [01-14-21 @ 06:33]    Creatinine Trend:  SCr 0.77 [01-14 @ 06:33]  SCr 0.84 [01-13 @ 06:52]  SCr 0.84 [01-12 @ 06:21]  SCr 0.79 [01-11 @ 07:23]  SCr 0.79 [01-10 @ 18:18]    Urinalysis - [01-11-21 @ 19:22]      Color Light Yellow / Appearance Clear / SG 1.013 / pH 6.5      Gluc Negative / Ketone Negative  / Bili Negative / Urobili Negative       Blood Negative / Protein Trace / Leuk Est Negative / Nitrite Negative      RBC 1 / WBC 0 / Hyaline  / Gran  / Sq Epi  / Non Sq Epi 0 / Bacteria Negative    Urine Sodium <35      [01-11-21 @ 19:21]  Urine Osmolality 310      [01-12-21 @ 04:30]    Ferritin 1388      [01-10-21 @ 21:53]      
[facility] [place] [facility]   MD Augustus Rees MD, D.O. Ruoru Wong, PA    From 7 AM - 5 PM:  OFFICE: [phone]  Dr. Monson cell: [phone]  Dr. Villegas cell: [phone]  Dr. Frank cell: [phone]  JOHANNA Ricci cell: [phone]    From 5 PM - 7 AM: Answering Service: [phone]  Date of service: 01-13-21 @ 12:25    RENAL FOLLOW UP NOTE  --------------------------------------------------------------------------------  HPI:  Pt covid +    PAST HISTORY  --------------------------------------------------------------------------------  No significant changes to PMH, PSH, FHx, SHx, unless otherwise noted    ALLERGIES & MEDICATIONS  --------------------------------------------------------------------------------  Allergies    Allergy Status Unknown    Intolerances    levofloxacin (Muscle Pain)    Standing Inpatient Medications  enoxaparin Injectable 40 milliGRAM(s) SubCutaneous daily  losartan 50 milliGRAM(s) Oral daily  metoprolol succinate ER 50 milliGRAM(s) Oral daily  remdesivir  IVPB   IV Intermittent   remdesivir  IVPB 100 milliGRAM(s) IV Intermittent every 24 hours    PRN Inpatient Medications  acetaminophen   Tablet .. 650 milliGRAM(s) Oral every 6 hours PRN      REVIEW OF SYSTEMS  --------------------------------------------------------------------------------  unable to obtain     VITALS/PHYSICAL EXAM  --------------------------------------------------------------------------------  T(C): 37.2 (01-13-21 @ 04:34), Max: 37.2 (01-13-21 @ 04:34)  HR: 80 (01-13-21 @ 04:34) (66 - 80)  BP: 138/72 (01-13-21 @ 04:34) (110/73 - 140/66)  RR: 18 (01-13-21 @ 11:00) (18 - 18)  SpO2: 98% (01-13-21 @ 11:00) (94% - 98%)  Wt(kg): --        01-12-21 @ 07:01  -  01-13-21 @ 07:00  --------------------------------------------------------  IN: 480 mL / OUT: 0 mL / NET: 480 mL    01-13-21 @ 07:01  -  01-13-21 @ 12:25  --------------------------------------------------------  IN: 260 mL / OUT: 0 mL / NET: 260 mL      LABS/STUDIES  --------------------------------------------------------------------------------              13.7   6.79  >-----------<  221      [01-13-21 @ 06:52]              40.1     137  |  103  |  18  ----------------------------<  88      [01-13-21 @ 06:52]  4.0   |  23  |  0.84        Ca     9.1     [01-13-21 @ 06:52]    TPro  6.6  /  Alb  4.0  /  TBili  0.4  /  DBili  x   /  AST  32  /  ALT  30  /  AlkPhos  74  [01-13-21 @ 06:52]      Creatinine Trend:  SCr 0.84 [01-13 @ 06:52]  SCr 0.84 [01-12 @ 06:21]  SCr 0.79 [01-11 @ 07:23]  SCr 0.79 [01-10 @ 18:18]  SCr 0.77 [01-10 @ 11:15]    Urinalysis - [01-11-21 @ 19:22]      Color Light Yellow / Appearance Clear / SG 1.013 / pH 6.5      Gluc Negative / Ketone Negative  / Bili Negative / Urobili Negative       Blood Negative / Protein Trace / Leuk Est Negative / Nitrite Negative      RBC 1 / WBC 0 / Hyaline  / Gran  / Sq Epi  / Non Sq Epi 0 / Bacteria Negative    Urine Sodium <35      [01-11-21 @ 19:21]  Urine Osmolality 310      [01-12-21 @ 04:30]    Ferritin 1388      [01-10-21 @ 21:53]      
[facility] [place] [facility]   MD Augustus Rees MD, D.O. Ruoru Wong, PA    From 7 AM - 5 PM:  OFFICE: [phone]  Dr. Monson cell: [phone]  Dr. Villegas cell: [phone]  Dr. Frank cell: [phone]  JOHANNA Ricci cell: [phone]    From 5 PM - 7 AM: Answering Service: [phone]  Date of service: 01-12-21 @ 11:07    RENAL FOLLOW UP NOTE  --------------------------------------------------------------------------------  HPI:  Pt covid +    PAST HISTORY  --------------------------------------------------------------------------------  No significant changes to PMH, PSH, FHx, SHx, unless otherwise noted    ALLERGIES & MEDICATIONS  --------------------------------------------------------------------------------  Allergies    Allergy Status Unknown    Intolerances    levofloxacin (Muscle Pain)    Standing Inpatient Medications  enoxaparin Injectable 40 milliGRAM(s) SubCutaneous daily  losartan 50 milliGRAM(s) Oral daily  metoprolol succinate ER 50 milliGRAM(s) Oral daily  remdesivir  IVPB   IV Intermittent   remdesivir  IVPB 100 milliGRAM(s) IV Intermittent every 24 hours    PRN Inpatient Medications  acetaminophen   Tablet .. 650 milliGRAM(s) Oral every 6 hours PRN      REVIEW OF SYSTEMS  --------------------------------------------------------------------------------  unable to obtain     VITALS/PHYSICAL EXAM  --------------------------------------------------------------------------------  T(C): 36.5 (01-12-21 @ 05:24), Max: 37.1 (01-11-21 @ 12:43)  HR: 63 (01-12-21 @ 05:24) (63 - 70)  BP: 112/61 (01-12-21 @ 05:24) (110/72 - 112/61)  RR: 18 (01-12-21 @ 05:24) (17 - 18)  SpO2: 95% (01-12-21 @ 05:24) (93% - 98%)  Wt(kg): --  Height (cm): 152.4 (01-10-21 @ 16:39)  Weight (kg): 68 (01-10-21 @ 16:39)  BMI (kg/m2): 29.3 (01-10-21 @ 16:39)  BSA (m2): 1.65 (01-10-21 @ 16:39)      01-11-21 @ 07:01  -  01-12-21 @ 07:00  --------------------------------------------------------  IN: 1240 mL / OUT: 400 mL / NET: 840 mL    LABS/STUDIES  --------------------------------------------------------------------------------              12.9   3.54  >-----------<  193      [01-12-21 @ 06:21]              37.2     137  |  104  |  20  ----------------------------<  89      [01-12-21 @ 06:21]  3.8   |  23  |  0.84        Ca     8.8     [01-12-21 @ 06:21]    TPro  6.2  /  Alb  3.7  /  TBili  0.3  /  DBili  x   /  AST  37  /  ALT  33  /  AlkPhos  65  [01-12-21 @ 06:21]          [01-10-21 @ 18:18]  Serum Osmolality 270      [01-11-21 @ 07:12]    Creatinine Trend:  SCr 0.84 [01-12 @ 06:21]  SCr 0.79 [01-11 @ 07:23]  SCr 0.79 [01-10 @ 18:18]  SCr 0.77 [01-10 @ 11:15]    Urinalysis - [01-11-21 @ 19:22]      Color Light Yellow / Appearance Clear / SG 1.013 / pH 6.5      Gluc Negative / Ketone Negative  / Bili Negative / Urobili Negative       Blood Negative / Protein Trace / Leuk Est Negative / Nitrite Negative      RBC 1 / WBC 0 / Hyaline  / Gran  / Sq Epi  / Non Sq Epi 0 / Bacteria Negative    Urine Sodium <35      [01-11-21 @ 19:21]  Urine Osmolality 310      [01-12-21 @ 04:30]    Ferritin 1388      [01-10-21 @ 21:53]      
Patient is a 75y old  Female who presents with a chief complaint of shortness of breath     SUBJECTIVE / OVERNIGHT EVENTS: no events overnight    T(C): 36.6 (01-12-21 @ 14:07), Max: 36.6 (01-12-21 @ 14:07)  HR: 67 (01-12-21 @ 14:07) (67 - 67)  BP: 110/73 (01-12-21 @ 14:07) (110/73 - 110/73)  RR: 18 (01-12-21 @ 14:07) (18 - 18)  SpO2: 97% (01-12-21 @ 14:07) (97% - 97%)      MEDICATIONS  (STANDING):  enoxaparin Injectable 40 milliGRAM(s) SubCutaneous daily  losartan 50 milliGRAM(s) Oral daily  metoprolol succinate ER 50 milliGRAM(s) Oral daily  remdesivir  IVPB   IV Intermittent   remdesivir  IVPB 100 milliGRAM(s) IV Intermittent every 24 hours    MEDICATIONS  (PRN):  acetaminophen   Tablet .. 650 milliGRAM(s) Oral every 6 hours PRN Temp greater or equal to 38C (100.4F), Mild Pain (1 - 3)      PHYSICAL EXAM:  GENERAL: NAD, well-developed  HEAD:  Atraumatic, Normocephalic  EYES: EOMI, PERRLA, conjunctiva and sclera clear  NECK: Supple, No JVD  CHEST/LUNG: rales at bases, no wheeze  HEART: Regular rate and rhythm; No murmurs, rubs, or gallops  ABDOMEN: Soft, Nontender, Nondistended; Bowel sounds present  EXTREMITIES:  2+ Peripheral Pulses, No clubbing, cyanosis, or edema  PSYCH: AAOx3  NEUROLOGY: non-focal  SKIN: No rashes or lesions                          12.9   3.54  )-----------( 193      ( 12 Jan 2021 06:21 )             37.2           LIVER FUNCTIONS - ( 12 Jan 2021 06:21 )  Alb: 3.7 g/dL / Pro: 6.2 g/dL / ALK PHOS: 65 U/L / ALT: 33 U/L / AST: 37 U/L / GGT: x             137|104|20<89  3.8|23|0.84  8.8,--,--  01-12 @ 06:21      CAPILLARY BLOOD GLUCOSE        RADIOLOGY & ADDITIONAL TESTS:    Imaging Personally Reviewed:    Consultant(s) Notes Reviewed:      Care Discussed with Consultants/Other Providers:  
Patient is a 75y old  Female who presents with a chief complaint of shortness of breath     SUBJECTIVE / OVERNIGHT EVENTS: no events overnight    T(C): 36.4 (01-13-21 @ 13:38), Max: 36.4 (01-13-21 @ 13:38)  HR: 62 (01-13-21 @ 13:38) (62 - 62)  BP: 134/75 (01-13-21 @ 13:38) (134/75 - 134/75)  RR: 18 (01-13-21 @ 13:38) (18 - 18)  SpO2: 95% (01-13-21 @ 13:38) (95% - 98%)      MEDICATIONS  (STANDING):  enoxaparin Injectable 40 milliGRAM(s) SubCutaneous daily  losartan 50 milliGRAM(s) Oral daily  metoprolol succinate ER 50 milliGRAM(s) Oral daily  remdesivir  IVPB   IV Intermittent   remdesivir  IVPB 100 milliGRAM(s) IV Intermittent every 24 hours    MEDICATIONS  (PRN):  acetaminophen   Tablet .. 650 milliGRAM(s) Oral every 6 hours PRN Temp greater or equal to 38C (100.4F), Mild Pain (1 - 3)      PHYSICAL EXAM:  GENERAL: NAD, well-developed  HEAD:  Atraumatic, Normocephalic  EYES: EOMI, PERRLA, conjunctiva and sclera clear  NECK: Supple, No JVD  CHEST/LUNG: rales at bases, no wheeze  HEART: Regular rate and rhythm; No murmurs, rubs, or gallops  ABDOMEN: Soft, Nontender, Nondistended; Bowel sounds present  EXTREMITIES:  2+ Peripheral Pulses, No clubbing, cyanosis, or edema  PSYCH: AAOx3  NEUROLOGY: non-focal  SKIN: No rashes or lesions                                           13.7   6.79  )-----------( 221      ( 13 Jan 2021 06:52 )             40.1           LIVER FUNCTIONS - ( 13 Jan 2021 06:52 )  Alb: 4.0 g/dL / Pro: 6.6 g/dL / ALK PHOS: 74 U/L / ALT: 30 U/L / AST: 32 U/L / GGT: x             137|103|18<88  4.0|23|0.84  9.1,--,--  01-13 @ 06:52    CAPILLARY BLOOD GLUCOSE          CAPILLARY BLOOD GLUCOSE        RADIOLOGY & ADDITIONAL TESTS:    Imaging Personally Reviewed:    Consultant(s) Notes Reviewed:      Care Discussed with Consultants/Other Providers:  
Patient is a 75y old  Female who presents with a chief complaint of     SUBJECTIVE / OVERNIGHT EVENTS: no events overnight    MEDICATIONS  (STANDING):  enoxaparin Injectable 40 milliGRAM(s) SubCutaneous daily  losartan 50 milliGRAM(s) Oral daily  metoprolol succinate ER 50 milliGRAM(s) Oral daily  remdesivir  IVPB   IV Intermittent   remdesivir  IVPB 100 milliGRAM(s) IV Intermittent every 24 hours    MEDICATIONS  (PRN):  acetaminophen   Tablet .. 650 milliGRAM(s) Oral every 6 hours PRN Temp greater or equal to 38C (100.4F), Mild Pain (1 - 3)      CAPILLARY BLOOD GLUCOSE        I&O's Summary    10 Trevin 2021 07:  -  2021 07:00  --------------------------------------------------------  IN: 1090 mL / OUT: 250 mL / NET: 840 mL    2021 07:  -  2021 23:52  --------------------------------------------------------  IN: 1000 mL / OUT: 400 mL / NET: 600 mL      T(C): 36.8 (21 @ 22:27), Max: 37.1 (21 @ 12:43)  HR: 70 (21 @ 22:27) (66 - 70)  BP: 110/72 (21 @ 22:27) (110/72 - 111/66)  RR: 17 (21 @ 22:27) (17 - 18)  SpO2: 93% (21 @ 22:27) (93% - 98%)    PHYSICAL EXAM:  GENERAL: NAD, well-developed  HEAD:  Atraumatic, Normocephalic  EYES: EOMI, PERRLA, conjunctiva and sclera clear  NECK: Supple, No JVD  CHEST/LUNG: rales at bases, no wheeze  HEART: Regular rate and rhythm; No murmurs, rubs, or gallops  ABDOMEN: Soft, Nontender, Nondistended; Bowel sounds present  EXTREMITIES:  2+ Peripheral Pulses, No clubbing, cyanosis, or edema  PSYCH: AAOx3  NEUROLOGY: non-focal  SKIN: No rashes or lesions    LABS:                        14.3   5.28  )-----------( 189      ( 10 Trevin 2021 11:15 )             39.7         131<L>  |  96  |  14  ----------------------------<  80  3.6   |  23  |  0.79    Ca    8.2<L>      2021 07:23    TPro  6.6  /  Alb  3.6  /  TBili  0.4  /  DBili  0.1  /  AST  50<H>  /  ALT  38  /  AlkPhos  66            Urinalysis Basic - ( 2021 19:22 )    Color: Light Yellow / Appearance: Clear / S.013 / pH: x  Gluc: x / Ketone: Negative  / Bili: Negative / Urobili: Negative   Blood: x / Protein: Trace / Nitrite: Negative   Leuk Esterase: Negative / RBC: 1 /hpf / WBC 0 /HPF   Sq Epi: x / Non Sq Epi: 0 /hpf / Bacteria: Negative        RADIOLOGY & ADDITIONAL TESTS:    Imaging Personally Reviewed:    Consultant(s) Notes Reviewed:      Care Discussed with Consultants/Other Providers:

## 2021-01-15 ENCOUNTER — TRANSCRIPTION ENCOUNTER (OUTPATIENT)
Age: 76
End: 2021-01-15

## 2021-01-16 ENCOUNTER — TRANSCRIPTION ENCOUNTER (OUTPATIENT)
Age: 76
End: 2021-01-16

## 2021-01-19 ENCOUNTER — TRANSCRIPTION ENCOUNTER (OUTPATIENT)
Age: 76
End: 2021-01-19

## 2021-01-21 PROCEDURE — 99285 EMERGENCY DEPT VISIT HI MDM: CPT

## 2021-01-21 PROCEDURE — 82803 BLOOD GASES ANY COMBINATION: CPT

## 2021-01-21 PROCEDURE — 85018 HEMOGLOBIN: CPT

## 2021-01-21 PROCEDURE — 80053 COMPREHEN METABOLIC PANEL: CPT

## 2021-01-21 PROCEDURE — 93005 ELECTROCARDIOGRAM TRACING: CPT

## 2021-01-21 PROCEDURE — 83615 LACTATE (LD) (LDH) ENZYME: CPT

## 2021-01-21 PROCEDURE — 85379 FIBRIN DEGRADATION QUANT: CPT

## 2021-01-21 PROCEDURE — 0225U NFCT DS DNA&RNA 21 SARSCOV2: CPT

## 2021-01-21 PROCEDURE — 82947 ASSAY GLUCOSE BLOOD QUANT: CPT

## 2021-01-21 PROCEDURE — 85014 HEMATOCRIT: CPT

## 2021-01-21 PROCEDURE — 80048 BASIC METABOLIC PNL TOTAL CA: CPT

## 2021-01-21 PROCEDURE — 83935 ASSAY OF URINE OSMOLALITY: CPT

## 2021-01-21 PROCEDURE — 81001 URINALYSIS AUTO W/SCOPE: CPT

## 2021-01-21 PROCEDURE — 84295 ASSAY OF SERUM SODIUM: CPT

## 2021-01-21 PROCEDURE — 84300 ASSAY OF URINE SODIUM: CPT

## 2021-01-21 PROCEDURE — 86769 SARS-COV-2 COVID-19 ANTIBODY: CPT

## 2021-01-21 PROCEDURE — 85025 COMPLETE CBC W/AUTO DIFF WBC: CPT

## 2021-01-21 PROCEDURE — 83930 ASSAY OF BLOOD OSMOLALITY: CPT

## 2021-01-21 PROCEDURE — 82330 ASSAY OF CALCIUM: CPT

## 2021-01-21 PROCEDURE — 71045 X-RAY EXAM CHEST 1 VIEW: CPT

## 2021-01-21 PROCEDURE — 83605 ASSAY OF LACTIC ACID: CPT

## 2021-01-21 PROCEDURE — 84145 PROCALCITONIN (PCT): CPT

## 2021-01-21 PROCEDURE — 86140 C-REACTIVE PROTEIN: CPT

## 2021-01-21 PROCEDURE — 82728 ASSAY OF FERRITIN: CPT

## 2021-01-21 PROCEDURE — 80076 HEPATIC FUNCTION PANEL: CPT

## 2021-01-21 PROCEDURE — 82435 ASSAY OF BLOOD CHLORIDE: CPT

## 2021-01-21 PROCEDURE — 84132 ASSAY OF SERUM POTASSIUM: CPT

## 2021-01-21 PROCEDURE — 85027 COMPLETE CBC AUTOMATED: CPT

## 2021-01-23 ENCOUNTER — TRANSCRIPTION ENCOUNTER (OUTPATIENT)
Age: 76
End: 2021-01-23

## 2021-01-24 ENCOUNTER — TRANSCRIPTION ENCOUNTER (OUTPATIENT)
Age: 76
End: 2021-01-24

## 2021-01-25 ENCOUNTER — TRANSCRIPTION ENCOUNTER (OUTPATIENT)
Age: 76
End: 2021-01-25

## 2021-03-16 ENCOUNTER — APPOINTMENT (OUTPATIENT)
Dept: OPHTHALMOLOGY | Facility: CLINIC | Age: 76
End: 2021-03-16
Payer: MEDICARE

## 2021-03-16 ENCOUNTER — NON-APPOINTMENT (OUTPATIENT)
Age: 76
End: 2021-03-16

## 2021-03-16 PROCEDURE — 92012 INTRM OPH EXAM EST PATIENT: CPT

## 2021-03-16 PROCEDURE — 99072 ADDL SUPL MATRL&STAF TM PHE: CPT

## 2021-03-31 ENCOUNTER — INPATIENT (INPATIENT)
Facility: HOSPITAL | Age: 76
LOS: 5 days | Discharge: HOME | DRG: 690 | End: 2021-04-06
Attending: INTERNAL MEDICINE | Admitting: INTERNAL MEDICINE
Payer: MEDICARE

## 2021-03-31 VITALS
HEART RATE: 101 BPM | RESPIRATION RATE: 20 BRPM | TEMPERATURE: 98 F | OXYGEN SATURATION: 100 % | DIASTOLIC BLOOD PRESSURE: 61 MMHG | SYSTOLIC BLOOD PRESSURE: 103 MMHG | WEIGHT: 149.91 LBS | HEIGHT: 60 IN

## 2021-03-31 DIAGNOSIS — N12 TUBULO-INTERSTITIAL NEPHRITIS, NOT SPECIFIED AS ACUTE OR CHRONIC: ICD-10-CM

## 2021-03-31 LAB
ALBUMIN SERPL ELPH-MCNC: 4 G/DL — SIGNIFICANT CHANGE UP (ref 3.3–5)
ALP SERPL-CCNC: 99 U/L — SIGNIFICANT CHANGE UP (ref 40–120)
ALT FLD-CCNC: 26 U/L — SIGNIFICANT CHANGE UP (ref 10–45)
ANION GAP SERPL CALC-SCNC: 15 MMOL/L — SIGNIFICANT CHANGE UP (ref 5–17)
APPEARANCE UR: ABNORMAL
AST SERPL-CCNC: 31 U/L — SIGNIFICANT CHANGE UP (ref 10–40)
BACTERIA # UR AUTO: ABNORMAL
BASOPHILS # BLD AUTO: 0 K/UL — SIGNIFICANT CHANGE UP (ref 0–0.2)
BASOPHILS NFR BLD AUTO: 0 % — SIGNIFICANT CHANGE UP (ref 0–2)
BILIRUB SERPL-MCNC: 2.5 MG/DL — HIGH (ref 0.2–1.2)
BILIRUB UR-MCNC: NEGATIVE — SIGNIFICANT CHANGE UP
BUN SERPL-MCNC: 41 MG/DL — HIGH (ref 7–23)
CALCIUM SERPL-MCNC: 9 MG/DL — SIGNIFICANT CHANGE UP (ref 8.4–10.5)
CHLORIDE SERPL-SCNC: 93 MMOL/L — LOW (ref 96–108)
CO2 SERPL-SCNC: 24 MMOL/L — SIGNIFICANT CHANGE UP (ref 22–31)
COLOR SPEC: YELLOW — SIGNIFICANT CHANGE UP
CREAT SERPL-MCNC: 2.09 MG/DL — HIGH (ref 0.5–1.3)
DIFF PNL FLD: ABNORMAL
EOSINOPHIL # BLD AUTO: 0 K/UL — SIGNIFICANT CHANGE UP (ref 0–0.5)
EOSINOPHIL NFR BLD AUTO: 0 % — SIGNIFICANT CHANGE UP (ref 0–6)
EPI CELLS # UR: 2 /HPF — SIGNIFICANT CHANGE UP
GLUCOSE SERPL-MCNC: 132 MG/DL — HIGH (ref 70–99)
GLUCOSE UR QL: NEGATIVE — SIGNIFICANT CHANGE UP
HCT VFR BLD CALC: 41.6 % — SIGNIFICANT CHANGE UP (ref 34.5–45)
HGB BLD-MCNC: 14.2 G/DL — SIGNIFICANT CHANGE UP (ref 11.5–15.5)
HYALINE CASTS # UR AUTO: 2 /LPF — SIGNIFICANT CHANGE UP (ref 0–7)
KETONES UR-MCNC: SIGNIFICANT CHANGE UP
LEUKOCYTE ESTERASE UR-ACNC: ABNORMAL
LIDOCAIN IGE QN: 36 U/L — SIGNIFICANT CHANGE UP (ref 7–60)
LYMPHOCYTES # BLD AUTO: 0.73 K/UL — LOW (ref 1–3.3)
LYMPHOCYTES # BLD AUTO: 5.2 % — LOW (ref 13–44)
MAGNESIUM SERPL-MCNC: 2.2 MG/DL — SIGNIFICANT CHANGE UP (ref 1.6–2.6)
MANUAL SMEAR VERIFICATION: SIGNIFICANT CHANGE UP
MCHC RBC-ENTMCNC: 30.3 PG — SIGNIFICANT CHANGE UP (ref 27–34)
MCHC RBC-ENTMCNC: 34.1 GM/DL — SIGNIFICANT CHANGE UP (ref 32–36)
MCV RBC AUTO: 88.7 FL — SIGNIFICANT CHANGE UP (ref 80–100)
MONOCYTES # BLD AUTO: 0.73 K/UL — SIGNIFICANT CHANGE UP (ref 0–0.9)
MONOCYTES NFR BLD AUTO: 5.2 % — SIGNIFICANT CHANGE UP (ref 2–14)
NEUTROPHILS # BLD AUTO: 12.57 K/UL — HIGH (ref 1.8–7.4)
NEUTROPHILS NFR BLD AUTO: 87 % — HIGH (ref 43–77)
NEUTS BAND # BLD: 2.6 % — SIGNIFICANT CHANGE UP (ref 0–8)
NITRITE UR-MCNC: NEGATIVE — SIGNIFICANT CHANGE UP
PH UR: 6 — SIGNIFICANT CHANGE UP (ref 5–8)
PHOSPHATE SERPL-MCNC: 2.5 MG/DL — SIGNIFICANT CHANGE UP (ref 2.5–4.5)
PLAT MORPH BLD: NORMAL — SIGNIFICANT CHANGE UP
PLATELET # BLD AUTO: 127 K/UL — LOW (ref 150–400)
POIKILOCYTOSIS BLD QL AUTO: SLIGHT — SIGNIFICANT CHANGE UP
POTASSIUM SERPL-MCNC: 3.4 MMOL/L — LOW (ref 3.5–5.3)
POTASSIUM SERPL-SCNC: 3.4 MMOL/L — LOW (ref 3.5–5.3)
PROT SERPL-MCNC: 7.6 G/DL — SIGNIFICANT CHANGE UP (ref 6–8.3)
PROT UR-MCNC: ABNORMAL
RBC # BLD: 4.69 M/UL — SIGNIFICANT CHANGE UP (ref 3.8–5.2)
RBC # FLD: 12.4 % — SIGNIFICANT CHANGE UP (ref 10.3–14.5)
RBC BLD AUTO: ABNORMAL
RBC CASTS # UR COMP ASSIST: 21 /HPF — HIGH (ref 0–4)
SODIUM SERPL-SCNC: 132 MMOL/L — LOW (ref 135–145)
SP GR SPEC: 1.01 — SIGNIFICANT CHANGE UP (ref 1.01–1.02)
UROBILINOGEN FLD QL: NEGATIVE — SIGNIFICANT CHANGE UP
WBC # BLD: 14.03 K/UL — HIGH (ref 3.8–10.5)
WBC # FLD AUTO: 14.03 K/UL — HIGH (ref 3.8–10.5)
WBC UR QL: 78 /HPF — HIGH (ref 0–5)

## 2021-03-31 PROCEDURE — 93010 ELECTROCARDIOGRAM REPORT: CPT

## 2021-03-31 PROCEDURE — 70450 CT HEAD/BRAIN W/O DYE: CPT | Mod: 26,MG

## 2021-03-31 PROCEDURE — 71045 X-RAY EXAM CHEST 1 VIEW: CPT | Mod: 26

## 2021-03-31 PROCEDURE — 99285 EMERGENCY DEPT VISIT HI MDM: CPT

## 2021-03-31 PROCEDURE — 74176 CT ABD & PELVIS W/O CONTRAST: CPT | Mod: 26,MA

## 2021-03-31 PROCEDURE — G1004: CPT

## 2021-03-31 RX ORDER — SODIUM CHLORIDE 9 MG/ML
1000 INJECTION, SOLUTION INTRAVENOUS ONCE
Refills: 0 | Status: COMPLETED | OUTPATIENT
Start: 2021-03-31 | End: 2021-03-31

## 2021-03-31 RX ORDER — ACETAMINOPHEN 500 MG
975 TABLET ORAL ONCE
Refills: 0 | Status: COMPLETED | OUTPATIENT
Start: 2021-03-31 | End: 2021-03-31

## 2021-03-31 RX ORDER — ONDANSETRON 8 MG/1
4 TABLET, FILM COATED ORAL ONCE
Refills: 0 | Status: COMPLETED | OUTPATIENT
Start: 2021-03-31 | End: 2021-03-31

## 2021-03-31 RX ORDER — POTASSIUM CHLORIDE 20 MEQ
40 PACKET (EA) ORAL DAILY
Refills: 0 | Status: DISCONTINUED | OUTPATIENT
Start: 2021-03-31 | End: 2021-04-06

## 2021-03-31 RX ORDER — CEFTRIAXONE 500 MG/1
1000 INJECTION, POWDER, FOR SOLUTION INTRAMUSCULAR; INTRAVENOUS ONCE
Refills: 0 | Status: COMPLETED | OUTPATIENT
Start: 2021-03-31 | End: 2021-03-31

## 2021-03-31 RX ADMIN — ONDANSETRON 4 MILLIGRAM(S): 8 TABLET, FILM COATED ORAL at 18:49

## 2021-03-31 RX ADMIN — SODIUM CHLORIDE 1000 MILLILITER(S): 9 INJECTION, SOLUTION INTRAVENOUS at 19:46

## 2021-03-31 RX ADMIN — Medication 40 MILLIEQUIVALENT(S): at 18:02

## 2021-03-31 RX ADMIN — SODIUM CHLORIDE 1000 MILLILITER(S): 9 INJECTION, SOLUTION INTRAVENOUS at 18:03

## 2021-03-31 RX ADMIN — Medication 975 MILLIGRAM(S): at 18:02

## 2021-03-31 RX ADMIN — CEFTRIAXONE 100 MILLIGRAM(S): 500 INJECTION, POWDER, FOR SOLUTION INTRAMUSCULAR; INTRAVENOUS at 21:24

## 2021-03-31 NOTE — ED ADULT NURSE REASSESSMENT NOTE - NS ED NURSE REASSESS COMMENT FT1
Patient ambulated to restroom independently during change of shift, states was not aware to provide urine sample, found by ancillary to be unsteady on her feet and was wheeled back in wheelchair to room by ancillary. Patient receiving LR by IV infusion, made aware by RN pending urine specimen collection before abx administration. Repeat VS stable, A&Ox4, NAD, respirations spontaneous and unlabored, resting comfortably in stretcher. Will continue to monitor.

## 2021-03-31 NOTE — ED ADULT NURSE REASSESSMENT NOTE - NS ED NURSE REASSESS COMMENT FT1
patient educated on importance of urine sample at this time. abx pending once urine sample obtained. MD lFores made aware.

## 2021-03-31 NOTE — H&P ADULT - HISTORY OF PRESENT ILLNESS
75 F with pmhx htn, copd, hx COVID January 10th 2021 presents with nausea, diarrhea, and headache x1 day.   No vomiting. No fevers. Associated with loss of appetite. unable to tolerate PO. Pt now has developed chest pain described as a burning sensation since arrival 2hrs ago. denies previous cardiac history or known cardiac fhx. Denies fever, chills, or covid contacts. denies dizziness, LOC, numbness, or weakness.     CT abdomen shows Left perinephric stranding may represent pyelonephritis

## 2021-03-31 NOTE — H&P ADULT - ASSESSMENT
75 F with pmhx htn, copd, hx COVID January 10th 2021 presents with nausea, diarrhea, and headache, ct abdomen shows p/w pyelonephritis

## 2021-03-31 NOTE — ED PROVIDER NOTE - CLINICAL SUMMARY MEDICAL DECISION MAKING FREE TEXT BOX
Dr. De León Note: pt with constellation of symptoms but with tender lower abdomen and new SAE, consider urinary retention/cystitis/renal stone/bladder stone, also consider viral syndrome with colitis and dehydration, fluids, bladder scan, iv fluids, ct scan abdomen, xray, ekg.

## 2021-03-31 NOTE — ED ADULT NURSE NOTE - OBJECTIVE STATEMENT
75 F with PMHx of HTN, COPD, and hx of COVID January 10th 2021 presents with nausea, diarrhea, and headache x1 day. Pt reports loss of appetite. unable to tolerate PO. Pt now has developed chest pain described as a burning sensation since arrival 2hrs ago. denies previous cardiac history or known cardiac fhx. Denies fever, chills, or covid contacts. denies dizziness, LOC, numbness, or weakness

## 2021-03-31 NOTE — ED PROVIDER NOTE - RAPID ASSESSMENT
75 F with PMHx of HTN, COPD, and hx of COVID January 10th 2021 presents with nausea, diarrhea, and headache x1 day. Pt reports loss of appetite. Pt now has developed chest pain described as a burning sensation. Denies fever.    Scribe Statement: Walter REDMOND Tiffany, attest that this documentation has been prepared under the direction and in the presence of Maxwell Yanez) 75 F with PMHx of HTN, COPD, and hx of COVID January 10th 2021 presents with nausea, diarrhea, and headache x1 day. Pt reports loss of appetite. unable to tolerate PO. Pt now has developed chest pain described as a burning sensation since getting here. denies previous cardiac history or known cardiac fhx. Denies fever, chills, or covid contacts. denies dizziness, LOC, numbness, or weakness    Scribe Statement: I, Marina Watson, attest that this documentation has been prepared under the direction and in the presence of Maxwell Yanez ([place])    Maxwell REDMOND PA-C, personally performed the service described in the documentation recorded by the scribe in my presence, and it accurately and completely records my words and actions. 75 F with PMHx of HTN, COPD, and hx of COVID January 10th 2021 presents with nausea, diarrhea, and headache x1 day. Pt reports loss of appetite. unable to tolerate PO. Pt now has developed chest pain described as a burning sensation since arrival 2hrs ago. denies previous cardiac history or known cardiac fhx. Denies fever, chills, or covid contacts. denies dizziness, LOC, numbness, or weakness    Scribe Statement: I, Marina Watson, attest that this documentation has been prepared under the direction and in the presence of Maxwell Yanez (PA)    Maxwell REDMOND PA-C, personally performed the service described in the documentation recorded by the scribe in my presence, and it accurately and completely records my words and actions. 75 F with PMHx of HTN, COPD, and hx of COVID January 10th 2021 presents with nausea, diarrhea, and headache x1 day. Pt reports loss of appetite. unable to tolerate PO. Pt now has developed chest pain described as a burning sensation since arrival 2hrs ago. denies previous cardiac history or known cardiac fhx. Denies fever, chills, or covid contacts. denies dizziness, LOC, numbness, or weakness    Scribe Statement: Walter REDMOND Tiffany, attest that this documentation has been prepared under the direction and in the presence of Maxwell Yanez ([place])    Maxwell REDMOND PA-C saw patient as a rapid assessment initially via telemedicine encounter, rest of care performed by another attending, and rapid assessment documented by scribani in my presence. Receiving team will follow up on labs, analgesia, any clinical imaging, and perform reassessment and disposition of the patient as clinically indicated. All decisions regarding the progression of care will be made at their discretion.

## 2021-03-31 NOTE — H&P ADULT - PROBLEM SELECTOR PLAN 1
CT abdomen shows Left perinephric stranding may represent pyelonephritis   Patient received Ceftriaxone x 1 dose. Continue with Ceftriaxone. Follow up urine and blood cultures.

## 2021-03-31 NOTE — ED PROVIDER NOTE - OBJECTIVE STATEMENT
75 F with PMHx of HTN, HLD, COPD, presents to the ED for headache, myalgias and abdominal pain with associated diarrhea. no vomiting. pshx-hysterectomy

## 2021-03-31 NOTE — H&P ADULT - PROBLEM SELECTOR PROBLEM 3
Detail Level: Zone Initiate Treatment: Cutivate 0.05% Cream twice daily for 2 weeks. Initiate Treatment: .\\nSun protection daily \\n\\nMild Cleanser at night. \\nAdvanced Skin Brightening 6% Cream at night \\nSmoothing Retinol 2X at night. Essential hypertension

## 2021-04-01 DIAGNOSIS — R07.9 CHEST PAIN, UNSPECIFIED: ICD-10-CM

## 2021-04-01 DIAGNOSIS — E78.5 HYPERLIPIDEMIA, UNSPECIFIED: ICD-10-CM

## 2021-04-01 DIAGNOSIS — I10 ESSENTIAL (PRIMARY) HYPERTENSION: ICD-10-CM

## 2021-04-01 DIAGNOSIS — N12 TUBULO-INTERSTITIAL NEPHRITIS, NOT SPECIFIED AS ACUTE OR CHRONIC: ICD-10-CM

## 2021-04-01 LAB — SARS-COV-2 RNA SPEC QL NAA+PROBE: SIGNIFICANT CHANGE UP

## 2021-04-01 PROCEDURE — 99223 1ST HOSP IP/OBS HIGH 75: CPT

## 2021-04-01 RX ORDER — ATORVASTATIN CALCIUM 80 MG/1
20 TABLET, FILM COATED ORAL AT BEDTIME
Refills: 0 | Status: DISCONTINUED | OUTPATIENT
Start: 2021-04-01 | End: 2021-04-06

## 2021-04-01 RX ORDER — CEFTRIAXONE 500 MG/1
1000 INJECTION, POWDER, FOR SOLUTION INTRAMUSCULAR; INTRAVENOUS EVERY 24 HOURS
Refills: 0 | Status: DISCONTINUED | OUTPATIENT
Start: 2021-04-01 | End: 2021-04-03

## 2021-04-01 RX ORDER — BUDESONIDE AND FORMOTEROL FUMARATE DIHYDRATE 160; 4.5 UG/1; UG/1
2 AEROSOL RESPIRATORY (INHALATION)
Refills: 0 | Status: DISCONTINUED | OUTPATIENT
Start: 2021-04-01 | End: 2021-04-06

## 2021-04-01 RX ORDER — ASPIRIN/CALCIUM CARB/MAGNESIUM 324 MG
81 TABLET ORAL DAILY
Refills: 0 | Status: DISCONTINUED | OUTPATIENT
Start: 2021-04-01 | End: 2021-04-06

## 2021-04-01 RX ORDER — PANTOPRAZOLE SODIUM 20 MG/1
40 TABLET, DELAYED RELEASE ORAL
Refills: 0 | Status: DISCONTINUED | OUTPATIENT
Start: 2021-04-01 | End: 2021-04-06

## 2021-04-01 RX ORDER — POTASSIUM CHLORIDE 20 MEQ
40 PACKET (EA) ORAL ONCE
Refills: 0 | Status: COMPLETED | OUTPATIENT
Start: 2021-04-01 | End: 2021-04-01

## 2021-04-01 RX ORDER — SODIUM CHLORIDE 9 MG/ML
1000 INJECTION INTRAMUSCULAR; INTRAVENOUS; SUBCUTANEOUS
Refills: 0 | Status: DISCONTINUED | OUTPATIENT
Start: 2021-04-01 | End: 2021-04-02

## 2021-04-01 RX ORDER — ACETAMINOPHEN 500 MG
1000 TABLET ORAL ONCE
Refills: 0 | Status: COMPLETED | OUTPATIENT
Start: 2021-04-01 | End: 2021-04-01

## 2021-04-01 RX ORDER — ACETAMINOPHEN 500 MG
650 TABLET ORAL EVERY 6 HOURS
Refills: 0 | Status: DISCONTINUED | OUTPATIENT
Start: 2021-04-01 | End: 2021-04-03

## 2021-04-01 RX ORDER — CEFTRIAXONE 500 MG/1
1000 INJECTION, POWDER, FOR SOLUTION INTRAMUSCULAR; INTRAVENOUS EVERY 24 HOURS
Refills: 0 | Status: DISCONTINUED | OUTPATIENT
Start: 2021-04-01 | End: 2021-04-01

## 2021-04-01 RX ORDER — METOPROLOL TARTRATE 50 MG
50 TABLET ORAL DAILY
Refills: 0 | Status: DISCONTINUED | OUTPATIENT
Start: 2021-04-01 | End: 2021-04-06

## 2021-04-01 RX ADMIN — Medication 1000 MILLIGRAM(S): at 11:00

## 2021-04-01 RX ADMIN — PANTOPRAZOLE SODIUM 40 MILLIGRAM(S): 20 TABLET, DELAYED RELEASE ORAL at 06:31

## 2021-04-01 RX ADMIN — Medication 650 MILLIGRAM(S): at 21:34

## 2021-04-01 RX ADMIN — CEFTRIAXONE 100 MILLIGRAM(S): 500 INJECTION, POWDER, FOR SOLUTION INTRAMUSCULAR; INTRAVENOUS at 11:26

## 2021-04-01 RX ADMIN — BUDESONIDE AND FORMOTEROL FUMARATE DIHYDRATE 2 PUFF(S): 160; 4.5 AEROSOL RESPIRATORY (INHALATION) at 17:38

## 2021-04-01 RX ADMIN — Medication 400 MILLIGRAM(S): at 03:03

## 2021-04-01 RX ADMIN — Medication 1000 MILLIGRAM(S): at 04:10

## 2021-04-01 RX ADMIN — BUDESONIDE AND FORMOTEROL FUMARATE DIHYDRATE 2 PUFF(S): 160; 4.5 AEROSOL RESPIRATORY (INHALATION) at 06:31

## 2021-04-01 RX ADMIN — Medication 650 MILLIGRAM(S): at 20:00

## 2021-04-01 RX ADMIN — Medication 40 MILLIEQUIVALENT(S): at 11:26

## 2021-04-01 RX ADMIN — Medication 40 MILLIEQUIVALENT(S): at 17:37

## 2021-04-01 RX ADMIN — SODIUM CHLORIDE 75 MILLILITER(S): 9 INJECTION INTRAMUSCULAR; INTRAVENOUS; SUBCUTANEOUS at 22:36

## 2021-04-01 RX ADMIN — Medication 50 MILLIGRAM(S): at 06:31

## 2021-04-01 RX ADMIN — Medication 400 MILLIGRAM(S): at 10:30

## 2021-04-01 RX ADMIN — ATORVASTATIN CALCIUM 20 MILLIGRAM(S): 80 TABLET, FILM COATED ORAL at 21:30

## 2021-04-01 NOTE — CONSULT NOTE ADULT - ASSESSMENT
Assessment:  he patient is a 75 year old female with past medical history of hypertension, hyperlipidemia, COPD, and history of kidney stones with COVID-19 infection on January 10th 2021 who presents with nausea, diarrhea, and headache for one day. She states that she has associated loss of appetite with decreased PO intake. She reports burning chest pain when she got to the emergency department. She was admitted to rule out ACS and found to have UTI on urinalysis. She was started on intravenous antibiotics.    Plan:  # Acute pyelonephritis   - Continue intravenous ceftriaxone  - Await final blood and urine cultures  - Monitor fever curve  - Trend CBC and CMP daily  - ID will continue to follow       Mickey Rodriguez MD PGY-4   Fellow, Infectious Diseases   Pager: [phone]  If no response, after 5pm and on weekends: Call [phone]   Assessment:  he patient is a 75 year old female with past medical history of hypertension, hyperlipidemia, COPD, and history of kidney stones with COVID-19 infection on January 10th 2021 who presents with nausea, diarrhea, and headache for one day. She states that she has associated loss of appetite with decreased PO intake. She reports burning chest pain when she got to the emergency department. She was admitted to rule out ACS and found to have UTI on urinalysis. She was started on intravenous antibiotics.    Plan:  # Left sided acute pyelonephritis likely due to passed kidney stone  - Continue intravenous ceftriaxone 1 g q24 hours  - Await final blood and urine cultures  - Monitor fever curve  - Trend CBC and CMP daily  - ID will continue to follow     Case seen and discussed with Dr. Busch at bedside.       Mickey Rodriguez MD PGY-4   Fellow, Infectious Diseases   Pager: [phone]  If no response, after 5pm and on weekends: Call [phone]

## 2021-04-01 NOTE — CONSULT NOTE ADULT - ATTENDING COMMENTS
pts seen examined  discussed with fellow  agree that pyelo mst likely  improving on ceftriaxone so await the cultures

## 2021-04-01 NOTE — PATIENT PROFILE ADULT - ARRIVAL FROM
-- Message is from the [facility]--    Reason for Call:  Patient returning call , please callback.    Caller Information       Type Contact Phone    07/30/2019 06:15 PM Phone (Incoming) Sylvia Lund (Self) [phone] (M)          Alternative phone number: [phone]    Turnaround time given to caller:   \"This message will be sent to [state Provider's name]. The clinical team will fulfill your request as soon as they review your message when the office opens tomorrow.\"     Home

## 2021-04-01 NOTE — PROVIDER CONTACT NOTE (MEDICATION) - SITUATION
Patient was ordered Potassium supplement 40mEq around the clock for 12pm. Another order for 10am was written for another 40meq.

## 2021-04-01 NOTE — CONSULT NOTE ADULT - SUBJECTIVE AND OBJECTIVE BOX
The patient is a 75 year old female who presents with a chief complaint of nausea.     HPI:  The patient is a 75 year old female with past medical history of hypertension, hyperlipidemia, COPD, and history of kidney stones with COVID-19 infection on 2021 who presents with nausea, diarrhea, and headache for one day. She states that she has associated loss of appetite with decreased PO intake. She reports burning chest pain when she got to the emergency department. She states that she had fever with Tmax 102.8 earlier today. She denies any chills, dizziness, palpitations, vomiting, abdominal pain, or constipation.     CBC showed neutrophilic leukocytosis with thrombocytopenia. CMP showed hyponatremia, hypokalemia, hypochloremia, acute kidney injury, and hyperglycemia. Urinalysis showed hematuria, positive, leukocyte esterase with bacteruria with WBCs > 78 consistent with urinary tract infection. COVID-19 PCR was negative. CXR showed trace right pleural effusion. CT abdomen without contrast shows left perinephric stranding may represent pyelonephritis with possible passed stone. CT head without contrast showed no intraparenchymal hematoma, mass effect or midline shift with age appropriate weight loss. She was started on intravenous ceftriaxone. Blood cultures and urine cultures are in process. She was admitted for chest pain to rule out ACS. COVID-19 Rodrigue Ab is pending.     prior hospital charts reviewed [x]  primary team notes reviewed [x]  other consultant notes reviewed [x]    PAST MEDICAL & SURGICAL HISTORY:  HTN (hypertension)  HLD (hyperlipidemia)  Kidney stone  H/O: hysterectomy        Allergies  Allergy Status Unknown    ANTIMICROBIALS (past 90 days)  MEDICATIONS  (STANDING):  cefTRIAXone   IVPB   100 mL/Hr IV Intermittent (21 @ 21:24)    cefTRIAXone   IVPB   100 mL/Hr IV Intermittent (21 @ 11:26)        cefTRIAXone   IVPB 1000 every 24 hours    OTHER MEDS: MEDICATIONS  (STANDING):  acetaminophen   Tablet .. 650 every 6 hours PRN  atorvastatin 20 at bedtime  budesonide 160 MICROgram(s)/formoterol 4.5 MICROgram(s) Inhaler 2 two times a day  metoprolol succinate ER 50 daily  pantoprazole    Tablet 40 before breakfast    SOCIAL HISTORY: Denies smoking, alcohol, or recreational drug use     FAMILY HISTORY:  No pertinent family history in first degree relatives      REVIEW OF SYSTEMS  [  ] ROS unobtainable because:    [x] All other systems negative except as noted below:	    Constitutional:  [x] fever [ ] chills  [ ] weight loss  [ ] weakness  Skin:  [ ] rash [ ] phlebitis	  Eyes: [ ] icterus [ ] pain  [ ] discharge	  ENMT: [ ] sore throat  [ ] thrush [ ] ulcers [ ] exudates  Respiratory: [ ] dyspnea [ ] hemoptysis [ ] cough [ ] sputum	  Cardiovascular:  [x] chest pain [ ] palpitations [ ] edema	  Gastrointestinal:  [ ] nausea [ ] vomiting [ ] diarrhea [ ] constipation [ ] pain	  Genitourinary:  [ ] dysuria [ ] frequency [ ] hematuria [ ] discharge [x] flank pain  [ ] incontinence  Musculoskeletal:  [ ] myalgias [ ] arthralgias [ ] arthritis  [ ] back pain  Neurological:  [ ] headache [ ] seizures  [ ] confusion/altered mental status  Psychiatric:  [ ] anxiety [ ] depression	  Hematology/Lymphatics:  [ ] lymphadenopathy  Endocrine:  [ ] adrenal [ ] thyroid  Allergic/Immunologic:	 [ ] transplant [ ] seasonal    Vital Signs Last 24 Hrs  T(F): 102.8 (21 @ 10:12), Max: 102.8 (21 @ 10:12)  Vital Signs Last 24 Hrs  HR: 104 (21 @ 10:12) (82 - 105)  BP: 153/66 (21 @ 10:12) (103/61 - 154/69)  RR: 18 (21 @ 10:12)  SpO2: 92% (21 @ 10:12) (92% - 100%)  Wt(kg): --    PHYSICAL EXAM:  Constitutional: non-toxic, no distress  HEAD/EYES: anicteric, no conjunctival injection  ENT:  supple, no thrush  Cardiovascular:   normal S1, S2, no murmur, no edema  Respiratory:  clear BS bilaterally, no wheezes, no rales  GI:  soft, non-tender, normal bowel sounds  :  no aguirre, no CVA tenderness  Musculoskeletal:  no synovitis, normal ROM  Neurologic: awake and alert, normal strength, no focal findings  Skin:  no rash, no erythema, no phlebitis  Heme/Onc: no lymphadenopathy   Psychiatric:  awake, alert, appropriate mood                            11.7   10.64 )-----------( 104      ( 2021 06:32 )             34.7   04    129<L>  |  95<L>  |  38<H>  ----------------------------<  113<H>  3.3<L>   |  22  |  1.66<H>    Ca    8.4      2021 06:32  Phos  2.5       Mg     2.2         TPro  7.6  /  Alb  4.0  /  TBili  2.5<H>  /  DBili  x   /  AST  31  /  ALT  26  /  AlkPhos  99      Urinalysis Basic - ( 31 Mar 2021 21:49 )    Color: Yellow / Appearance: Slightly Turbid / S.014 / pH: x  Gluc: x / Ketone: Trace  / Bili: Negative / Urobili: Negative   Blood: x / Protein: 30 mg/dL / Nitrite: Negative   Leuk Esterase: Large / RBC: 21 /hpf / WBC 78 /HPF   Sq Epi: x / Non Sq Epi: 2 /hpf / Bacteria: Moderate    MICROBIOLOGY:    Blood cultures and urine cultures pending    COVID-19 PCR negative       RADIOLOGY:    < from: CT Head No Cont (21 @ 18:24) >  FINDINGS:  Mild prominence of the sulci and ventricles are consistent with age-appropriate volume loss. Cavum septum pellucidum et vergae. There are hemispheric white matter areas of low attenuation which are nonspecific but likely related to sequelae of microvascular disease. There are dystrophic calcifications in the basal ganglia, not significant changed.     There is no intraparenchymal hematoma, mass effect or midline shift. The basal cisterns are patent.  No abnormal extra-axial fluid collections are present.    The calvarium is intact. The visualized intraorbital compartments, paranasal sinuses and tympanomastoid cavities appear free of acute disease. Bilateral cataract surgery.    IMPRESSION:    No acute hemorrhage.    < end of copied text >    < from: CT Abdomen and Pelvis No Cont (21 @ 18:16) >  FINDINGS:    LOWER CHEST: Small hiatal hernia.    LIVER: Within normal limits.  BILE DUCTS: Normal caliber.  GALLBLADDER: Within normal limits.  SPLEEN: Within normal limits.  PANCREAS: Within normal limits.  ADRENALS: Within normal limits.  KIDNEYS/URETERS: Left perinephric stranding.    BLADDER: Within normal limits.  REPRODUCTIVE ORGANS: Hysterectomy. No adnexal mass.    BOWEL: No bowel obstruction.  PERITONEUM: No ascites.  VESSELS:  Within normal limits.  RETROPERITONEUM/LYMPH NODES: No lymphadenopathy.  ABDOMINAL WALL: Within normal limits.  BONES: Within normal limits.    IMPRESSION: Left perinephric stranding may represent pyelonephritis or recently passed calculus.        < end of copied text >    < from: Xray Chest 1 View AP/PA (21 @ 17:59) >  FINDINGS:    The heart appears enlarged. Aortic calcifications.  The lungs are clear.  Trace right pleural effusion. No pneumothorax.  Degenerative changes of the spine.    IMPRESSION:  Trace right pleural effusion        < end of copied text >

## 2021-04-01 NOTE — ED ADULT NURSE REASSESSMENT NOTE - NS ED NURSE REASSESS COMMENT FT1
Bladder scan performed as per Admitting MD request. Bladder scan shows 140mL and admitting MD made aware. No further RN interventions needed at this time.

## 2021-04-02 LAB
ANION GAP SERPL CALC-SCNC: 9 MMOL/L — SIGNIFICANT CHANGE UP (ref 5–17)
BASOPHILS # BLD AUTO: 0.02 K/UL — SIGNIFICANT CHANGE UP (ref 0–0.2)
BASOPHILS NFR BLD AUTO: 0.2 % — SIGNIFICANT CHANGE UP (ref 0–2)
BUN SERPL-MCNC: 33 MG/DL — HIGH (ref 7–23)
CALCIUM SERPL-MCNC: 8.3 MG/DL — LOW (ref 8.4–10.5)
CHLORIDE SERPL-SCNC: 106 MMOL/L — SIGNIFICANT CHANGE UP (ref 96–108)
CK MB CFR SERPL CALC: 1 NG/ML — SIGNIFICANT CHANGE UP (ref 0–3.8)
CK SERPL-CCNC: 37 U/L — SIGNIFICANT CHANGE UP (ref 25–170)
CO2 SERPL-SCNC: 21 MMOL/L — LOW (ref 22–31)
COVID-19 SPIKE DOMAIN AB INTERP: POSITIVE
COVID-19 SPIKE DOMAIN ANTIBODY RESULT: >250 U/ML — HIGH
CREAT SERPL-MCNC: 1.28 MG/DL — SIGNIFICANT CHANGE UP (ref 0.5–1.3)
EOSINOPHIL # BLD AUTO: 0.11 K/UL — SIGNIFICANT CHANGE UP (ref 0–0.5)
EOSINOPHIL NFR BLD AUTO: 1 % — SIGNIFICANT CHANGE UP (ref 0–6)
GLUCOSE SERPL-MCNC: 100 MG/DL — HIGH (ref 70–99)
HCT VFR BLD CALC: 34 % — LOW (ref 34.5–45)
HGB BLD-MCNC: 11.6 G/DL — SIGNIFICANT CHANGE UP (ref 11.5–15.5)
IMM GRANULOCYTES NFR BLD AUTO: 0.5 % — SIGNIFICANT CHANGE UP (ref 0–1.5)
LYMPHOCYTES # BLD AUTO: 0.63 K/UL — LOW (ref 1–3.3)
LYMPHOCYTES # BLD AUTO: 5.6 % — LOW (ref 13–44)
MCHC RBC-ENTMCNC: 30.3 PG — SIGNIFICANT CHANGE UP (ref 27–34)
MCHC RBC-ENTMCNC: 34.1 GM/DL — SIGNIFICANT CHANGE UP (ref 32–36)
MCV RBC AUTO: 88.8 FL — SIGNIFICANT CHANGE UP (ref 80–100)
MONOCYTES # BLD AUTO: 1.1 K/UL — HIGH (ref 0–0.9)
MONOCYTES NFR BLD AUTO: 9.8 % — SIGNIFICANT CHANGE UP (ref 2–14)
NEUTROPHILS # BLD AUTO: 9.34 K/UL — HIGH (ref 1.8–7.4)
NEUTROPHILS NFR BLD AUTO: 82.9 % — HIGH (ref 43–77)
NRBC # BLD: 0 /100 WBCS — SIGNIFICANT CHANGE UP (ref 0–0)
PLATELET # BLD AUTO: 114 K/UL — LOW (ref 150–400)
POTASSIUM SERPL-MCNC: 4.2 MMOL/L — SIGNIFICANT CHANGE UP (ref 3.5–5.3)
POTASSIUM SERPL-SCNC: 4.2 MMOL/L — SIGNIFICANT CHANGE UP (ref 3.5–5.3)
RBC # BLD: 3.83 M/UL — SIGNIFICANT CHANGE UP (ref 3.8–5.2)
RBC # FLD: 12.9 % — SIGNIFICANT CHANGE UP (ref 10.3–14.5)
SARS-COV-2 IGG+IGM SERPL QL IA: >250 U/ML — HIGH
SARS-COV-2 IGG+IGM SERPL QL IA: POSITIVE
SODIUM SERPL-SCNC: 136 MMOL/L — SIGNIFICANT CHANGE UP (ref 135–145)
TROPONIN T, HIGH SENSITIVITY RESULT: 14 NG/L — SIGNIFICANT CHANGE UP (ref 0–51)
WBC # BLD: 11.26 K/UL — HIGH (ref 3.8–10.5)
WBC # FLD AUTO: 11.26 K/UL — HIGH (ref 3.8–10.5)

## 2021-04-02 PROCEDURE — 99232 SBSQ HOSP IP/OBS MODERATE 35: CPT

## 2021-04-02 RX ORDER — ACETAMINOPHEN 500 MG
650 TABLET ORAL ONCE
Refills: 0 | Status: COMPLETED | OUTPATIENT
Start: 2021-04-02 | End: 2021-04-02

## 2021-04-02 RX ORDER — SODIUM CHLORIDE 9 MG/ML
1000 INJECTION INTRAMUSCULAR; INTRAVENOUS; SUBCUTANEOUS
Refills: 0 | Status: DISCONTINUED | OUTPATIENT
Start: 2021-04-02 | End: 2021-04-04

## 2021-04-02 RX ADMIN — PANTOPRAZOLE SODIUM 40 MILLIGRAM(S): 20 TABLET, DELAYED RELEASE ORAL at 05:53

## 2021-04-02 RX ADMIN — BUDESONIDE AND FORMOTEROL FUMARATE DIHYDRATE 2 PUFF(S): 160; 4.5 AEROSOL RESPIRATORY (INHALATION) at 05:53

## 2021-04-02 RX ADMIN — CEFTRIAXONE 100 MILLIGRAM(S): 500 INJECTION, POWDER, FOR SOLUTION INTRAMUSCULAR; INTRAVENOUS at 12:52

## 2021-04-02 RX ADMIN — Medication 50 MILLIGRAM(S): at 05:53

## 2021-04-02 RX ADMIN — BUDESONIDE AND FORMOTEROL FUMARATE DIHYDRATE 2 PUFF(S): 160; 4.5 AEROSOL RESPIRATORY (INHALATION) at 21:58

## 2021-04-02 RX ADMIN — Medication 650 MILLIGRAM(S): at 05:53

## 2021-04-02 RX ADMIN — Medication 40 MILLIEQUIVALENT(S): at 12:52

## 2021-04-02 RX ADMIN — Medication 650 MILLIGRAM(S): at 18:23

## 2021-04-02 RX ADMIN — Medication 81 MILLIGRAM(S): at 12:52

## 2021-04-02 RX ADMIN — ATORVASTATIN CALCIUM 20 MILLIGRAM(S): 80 TABLET, FILM COATED ORAL at 21:57

## 2021-04-02 RX ADMIN — SODIUM CHLORIDE 50 MILLILITER(S): 9 INJECTION INTRAMUSCULAR; INTRAVENOUS; SUBCUTANEOUS at 18:20

## 2021-04-02 NOTE — PHYSICAL THERAPY INITIAL EVALUATION ADULT - DISCHARGE DISPOSITION, PT EVAL
Pt. does not want home PT or outpatient PT./home/home w/ home PT Pt. does not want home PT, offered outpatient to improve functional balance and ambulation with rolling walker./home/home w/ home PT/outpatient PT

## 2021-04-02 NOTE — PHYSICAL THERAPY INITIAL EVALUATION ADULT - LIVES WITH, PROFILE
Pt. lives alone in a rented room in a house. Pt. reports she has 13 steps to her room, +handrail./alone

## 2021-04-02 NOTE — PHYSICAL THERAPY INITIAL EVALUATION ADULT - PERTINENT HX OF CURRENT PROBLEM, REHAB EVAL
PMH of HTN, hyperlipidemia, COPD, and history of kidney stones with COVID-19 infection on January 10th 2021 who presents with nausea, diarrhea, and headache for one day, found to have UTI on urinalysis. CT A&P 3/31 showed left perinephric stranding may represent pyelonephritis or recently passed calculus.

## 2021-04-02 NOTE — CONSULT NOTE ADULT - SUBJECTIVE AND OBJECTIVE BOX
NEPHROLOGY - NSN    Patient seen and examined.    HPI:  75 F with pmhx htn, copd, hx COVID 2021 presents with nausea, diarrhea, and headache x1 day.   No vomiting. No fevers. Associated with loss of appetite. unable to tolerate PO. Pt now has developed chest pain described as a burning sensation since arrival 2hrs ago. denies previous cardiac history or known cardiac fhx. Denies fever, chills, or covid contacts. denies dizziness, LOC, numbness, or weakness.     CT abdomen shows Left perinephric stranding may represent pyelonephritis  (31 Mar 2021 16:52)      PAST MEDICAL & SURGICAL HISTORY:  HTN (hypertension)    HLD (hyperlipidemia)    Kidney stone    H/O: hysterectomy        MEDICATIONS  (STANDING):  aspirin  chewable 81 milliGRAM(s) Oral daily  atorvastatin 20 milliGRAM(s) Oral at bedtime  budesonide 160 MICROgram(s)/formoterol 4.5 MICROgram(s) Inhaler 2 Puff(s) Inhalation two times a day  cefTRIAXone   IVPB 1000 milliGRAM(s) IV Intermittent every 24 hours  metoprolol succinate ER 50 milliGRAM(s) Oral daily  pantoprazole    Tablet 40 milliGRAM(s) Oral before breakfast  potassium chloride    Tablet ER 40 milliEquivalent(s) Oral daily  sodium chloride 0.9%. 1000 milliLiter(s) (75 mL/Hr) IV Continuous <Continuous>      Allergies    Allergy Status Unknown    Intolerances    levofloxacin (Muscle Pain)      SOCIAL HISTORY:  Denies alcohol abuse, drug abuse or tobacco usage.     FAMILY HISTORY:  No pertinent family history in first degree relatives        VITALS:  T(C): 36.9 (21 @ 08:18), Max: 39.3 (21 @ 10:12)  HR: 68 (21 @ 08:18) (52 - 104)  BP: 95/56 (21 @ 08:18) (95/56 - 153/66)  RR: 18 (21 @ 08:18) (18 - 18)  SpO2: 96% (21 @ 08:18) (92% - 97%)    REVIEW OF SYSTEMS:  Denies any nausea, vomiting, diarrhea, fever or chills. Denies chest pain, SOB, focal weakness, hematuria or dysuria. Good oral intake and denies fatigue or weakness. All other pertinent systems are reviewed and are negative.    PHYSICAL EXAM:  Constitutional: NAD  HEENT: EOMI  Neck:  No JVD, supple   Respiratory: CTA B/L  Cardiovascular: S1 and S2, RRR  Gastrointestinal: + BS, soft, NT, ND  Extremities: No peripheral edema, + peripheral pulses  Neurological: A/O x 3, CN2-12 intact  Psychiatric: Normal mood, normal affect  : No Marques  Skin: No rashes, C/D/I  Access: Not applicable    I and O's:     @ 07:  -   @ 07:00  --------------------------------------------------------  IN: 0 mL / OUT: 1 mL / NET: -1 mL     @ 07:01  -   @ 07:00  --------------------------------------------------------  IN: 1480 mL / OUT: 1 mL / NET: 1479 mL          LABS:                        11.6   11.26 )-----------( 114      ( 2021 06:20 )             34.0         136  |  106  |  33<H>  ----------------------------<  100<H>  4.2   |  21<L>  |  1.28    Ca    8.3<L>      2021 06:20  Phos  2.5       Mg     2.2         TPro  7.6  /  Alb  4.0  /  TBili  2.5<H>  /  DBili  x   /  AST  31  /  ALT  26  /  AlkPhos  99        URINE:  Urinalysis Basic - ( 31 Mar 2021 21:49 )    Color: Yellow / Appearance: Slightly Turbid / S.014 / pH: x  Gluc: x / Ketone: Trace  / Bili: Negative / Urobili: Negative   Blood: x / Protein: 30 mg/dL / Nitrite: Negative   Leuk Esterase: Large / RBC: 21 /hpf / WBC 78 /HPF   Sq Epi: x / Non Sq Epi: 2 /hpf / Bacteria: Moderate        RADIOLOGY & ADDITIONAL STUDIES:    < from: CT Abdomen and Pelvis No Cont (21 @ 18:16) >    EXAM:  CT ABDOMEN AND PELVIS                            PROCEDURE DATE:  2021            INTERPRETATION:  CLINICAL INFORMATION: lower abd pain    COMPARISON: 3.2.20.    CONTRAST/COMPLICATIONS:  IV Contrast: None  Oral Contrast: None  Complications: None    PROCEDURE:  CT of the Abdomen and Pelvis was performed.  Sagittal and coronal reformats were performed.    FINDINGS:    LOWER CHEST: Small hiatal hernia.    LIVER: Within normal limits.  BILE DUCTS: Normal caliber.  GALLBLADDER: Within normal limits.  SPLEEN: Within normal limits.  PANCREAS: Within normal limits.  ADRENALS: Within normal limits.  KIDNEYS/URETERS: Left perinephric stranding.    BLADDER: Within normal limits.  REPRODUCTIVE ORGANS: Hysterectomy. No adnexal mass.    BOWEL: No bowel obstruction.  PERITONEUM: No ascites.  VESSELS:  Within normal limits.  RETROPERITONEUM/LYMPH NODES: No lymphadenopathy.  ABDOMINAL WALL: Within normal limits.  BONES: Within normal limits.    IMPRESSION: Left perinephric stranding may represent pyelonephritis or recently passed calculus.                      GAUTAM CARRASCO MD; Attending Radiologist  This document has been electronically signed. Mar 31 2021  6:26PM    < end of copied text >   NEPHROLOGY - NSN    Patient seen and examined.    HPI:  75 F with pmhx htn, copd, hx COVID 2021 presents with nausea, diarrhea, and headache x1 day.   No vomiting. No fevers. Associated with loss of appetite. unable to tolerate PO. Pt now has developed chest pain described as a burning sensation since arrival 2hrs ago. denies previous cardiac history or known cardiac fhx. Denies fever, chills, or covid contacts. denies dizziness, LOC, numbness, or weakness.     CT abdomen shows Left perinephric stranding may represent pyelonephritis  (31 Mar 2021 16:52)  There is no hematuria or bubbles in the urine.  No history of NSAIDS or nephrolithisis.  The patient urinates once or twice in the night and there is no incontinence.  No family hx or renal disease or back pain.    No recent abx use.  No alleviating or aggravating factors with respect to the kidneys.   She denies DM or prior hx of CKD  She states no sx of cystitis as well       PAST MEDICAL & SURGICAL HISTORY:  HTN (hypertension)    HLD (hyperlipidemia)    Kidney stone    H/O: hysterectomy        MEDICATIONS  (STANDING):  aspirin  chewable 81 milliGRAM(s) Oral daily  atorvastatin 20 milliGRAM(s) Oral at bedtime  budesonide 160 MICROgram(s)/formoterol 4.5 MICROgram(s) Inhaler 2 Puff(s) Inhalation two times a day  cefTRIAXone   IVPB 1000 milliGRAM(s) IV Intermittent every 24 hours  metoprolol succinate ER 50 milliGRAM(s) Oral daily  pantoprazole    Tablet 40 milliGRAM(s) Oral before breakfast  potassium chloride    Tablet ER 40 milliEquivalent(s) Oral daily  sodium chloride 0.9%. 1000 milliLiter(s) (75 mL/Hr) IV Continuous <Continuous>      Allergies    Allergy Status Unknown    Intolerances    levofloxacin (Muscle Pain)      SOCIAL HISTORY:  Denies alcohol abuse, drug abuse or tobacco usage.     FAMILY HISTORY:  No pertinent family history in first degree relatives        VITALS:  T(C): 36.9 (21 @ 08:18), Max: 39.3 (21 @ 10:12)  HR: 68 (21 @ 08:18) (52 - 104)  BP: 95/56 (21 @ 08:18) (95/56 - 153/66)  RR: 18 (21 @ 08:18) (18 - 18)  SpO2: 96% (21 @ 08:18) (92% - 97%)    REVIEW OF SYSTEMS:    Denies chest pain, SOB, focal weakness, hematuria or dysuria.  All other pertinent systems are reviewed and are negative.    PHYSICAL EXAM:  Constitutional: NAD  HEENT: EOMI  Neck:  No JVD, supple   Respiratory: CTA B/L  Cardiovascular: S1 and S2, RRR  Gastrointestinal: + BS, soft, NT, ND  Extremities: No peripheral edema, + peripheral pulses  Neurological: A/O x 3, CN2-12 intact  Psychiatric: Normal mood, normal affect  : No Maruqes  Skin: No rashes, C/D/I  Access: Not applicable    I and O's:     @ 07:01  -   @ 07:00  --------------------------------------------------------  IN: 0 mL / OUT: 1 mL / NET: -1 mL     @ 07:01  -   @ 07:00  --------------------------------------------------------  IN: 1480 mL / OUT: 1 mL / NET: 1479 mL          LABS:                        11.6   11.26 )-----------( 114      ( 2021 06:20 )             34.0         136  |  106  |  33<H>  ----------------------------<  100<H>  4.2   |  21<L>  |  1.28    Ca    8.3<L>      2021 06:20  Phos  2.5       Mg     2.2         TPro  7.6  /  Alb  4.0  /  TBili  2.5<H>  /  DBili  x   /  AST  31  /  ALT  26  /  AlkPhos  99        URINE:  Urinalysis Basic - ( 31 Mar 2021 21:49 )    Color: Yellow / Appearance: Slightly Turbid / S.014 / pH: x  Gluc: x / Ketone: Trace  / Bili: Negative / Urobili: Negative   Blood: x / Protein: 30 mg/dL / Nitrite: Negative   Leuk Esterase: Large / RBC: 21 /hpf / WBC 78 /HPF   Sq Epi: x / Non Sq Epi: 2 /hpf / Bacteria: Moderate        RADIOLOGY & ADDITIONAL STUDIES:    < from: CT Abdomen and Pelvis No Cont (21 @ 18:16) >    EXAM:  CT ABDOMEN AND PELVIS                            PROCEDURE DATE:  2021            INTERPRETATION:  CLINICAL INFORMATION: lower abd pain    COMPARISON: 3.2.20.    CONTRAST/COMPLICATIONS:  IV Contrast: None  Oral Contrast: None  Complications: None    PROCEDURE:  CT of the Abdomen and Pelvis was performed.  Sagittal and coronal reformats were performed.    FINDINGS:    LOWER CHEST: Small hiatal hernia.    LIVER: Within normal limits.  BILE DUCTS: Normal caliber.  GALLBLADDER: Within normal limits.  SPLEEN: Within normal limits.  PANCREAS: Within normal limits.  ADRENALS: Within normal limits.  KIDNEYS/URETERS: Left perinephric stranding.    BLADDER: Within normal limits.  REPRODUCTIVE ORGANS: Hysterectomy. No adnexal mass.    BOWEL: No bowel obstruction.  PERITONEUM: No ascites.  VESSELS:  Within normal limits.  RETROPERITONEUM/LYMPH NODES: No lymphadenopathy.  ABDOMINAL WALL: Within normal limits.  BONES: Within normal limits.    IMPRESSION: Left perinephric stranding may represent pyelonephritis or recently passed calculus.                      GAUTAM CARRASCO MD; Attending Radiologist  This document has been electronically signed. Mar 31 2021  6:26PM    < end of copied text >

## 2021-04-02 NOTE — PHYSICAL THERAPY INITIAL EVALUATION ADULT - PLANNED THERAPY INTERVENTIONS, PT EVAL
Stair Training: GOAL: Pt. will negotiate 13 steps independently using 1 handrail in 2 weeks./gait training/transfer training

## 2021-04-02 NOTE — PHYSICAL THERAPY INITIAL EVALUATION ADULT - ADDITIONAL COMMENTS
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 Pt. has a bathroom and bedroom in her room of the house. Pt. owns a cane. Pt. p/w with some concerns of being homeless if she is evicted,  made aware.

## 2021-04-02 NOTE — PROGRESS NOTE ADULT - ASSESSMENT
Assessment:  he patient is a 75 year old female with past medical history of hypertension, hyperlipidemia, COPD, and history of kidney stones with COVID-19 infection on January 10th 2021 who presents with nausea, diarrhea, and headache for one day. She states that she has associated loss of appetite with decreased PO intake. She reports burning chest pain when she got to the emergency department. She was admitted to rule out ACS and found to have UTI on urinalysis. She was started on intravenous antibiotics.    Plan:  # Left sided acute pyelonephritis likely due to passed kidney stone  - Continue intravenous ceftriaxone 1 g q24 hours  - Await final blood and urine cultures  - Monitor fever curve  -  await sensitivities of ecoli  it can take a few days for the fever to resolve with pyelo

## 2021-04-03 LAB
-  AMIKACIN: SIGNIFICANT CHANGE UP
-  AMOXICILLIN/CLAVULANIC ACID: SIGNIFICANT CHANGE UP
-  AMPICILLIN/SULBACTAM: SIGNIFICANT CHANGE UP
-  AMPICILLIN: SIGNIFICANT CHANGE UP
-  AZTREONAM: SIGNIFICANT CHANGE UP
-  CEFAZOLIN: SIGNIFICANT CHANGE UP
-  CEFEPIME: SIGNIFICANT CHANGE UP
-  CEFOXITIN: SIGNIFICANT CHANGE UP
-  CEFTRIAXONE: SIGNIFICANT CHANGE UP
-  CIPROFLOXACIN: SIGNIFICANT CHANGE UP
-  ERTAPENEM: SIGNIFICANT CHANGE UP
-  GENTAMICIN: SIGNIFICANT CHANGE UP
-  IMIPENEM: SIGNIFICANT CHANGE UP
-  LEVOFLOXACIN: SIGNIFICANT CHANGE UP
-  MEROPENEM: SIGNIFICANT CHANGE UP
-  NITROFURANTOIN: SIGNIFICANT CHANGE UP
-  PIPERACILLIN/TAZOBACTAM: SIGNIFICANT CHANGE UP
-  TIGECYCLINE: SIGNIFICANT CHANGE UP
-  TOBRAMYCIN: SIGNIFICANT CHANGE UP
-  TRIMETHOPRIM/SULFAMETHOXAZOLE: SIGNIFICANT CHANGE UP
ANION GAP SERPL CALC-SCNC: 12 MMOL/L — SIGNIFICANT CHANGE UP (ref 5–17)
BUN SERPL-MCNC: 24 MG/DL — HIGH (ref 7–23)
CALCIUM SERPL-MCNC: 8.7 MG/DL — SIGNIFICANT CHANGE UP (ref 8.4–10.5)
CHLORIDE SERPL-SCNC: 106 MMOL/L — SIGNIFICANT CHANGE UP (ref 96–108)
CO2 SERPL-SCNC: 18 MMOL/L — LOW (ref 22–31)
CREAT SERPL-MCNC: 1.04 MG/DL — SIGNIFICANT CHANGE UP (ref 0.5–1.3)
CULTURE RESULTS: SIGNIFICANT CHANGE UP
GLUCOSE SERPL-MCNC: 83 MG/DL — SIGNIFICANT CHANGE UP (ref 70–99)
HCT VFR BLD CALC: 35 % — SIGNIFICANT CHANGE UP (ref 34.5–45)
HGB BLD-MCNC: 11.9 G/DL — SIGNIFICANT CHANGE UP (ref 11.5–15.5)
MCHC RBC-ENTMCNC: 30.1 PG — SIGNIFICANT CHANGE UP (ref 27–34)
MCHC RBC-ENTMCNC: 34 GM/DL — SIGNIFICANT CHANGE UP (ref 32–36)
MCV RBC AUTO: 88.6 FL — SIGNIFICANT CHANGE UP (ref 80–100)
METHOD TYPE: SIGNIFICANT CHANGE UP
NRBC # BLD: 0 /100 WBCS — SIGNIFICANT CHANGE UP (ref 0–0)
ORGANISM # SPEC MICROSCOPIC CNT: SIGNIFICANT CHANGE UP
ORGANISM # SPEC MICROSCOPIC CNT: SIGNIFICANT CHANGE UP
PLATELET # BLD AUTO: 167 K/UL — SIGNIFICANT CHANGE UP (ref 150–400)
POTASSIUM SERPL-MCNC: 4.6 MMOL/L — SIGNIFICANT CHANGE UP (ref 3.5–5.3)
POTASSIUM SERPL-SCNC: 4.6 MMOL/L — SIGNIFICANT CHANGE UP (ref 3.5–5.3)
RBC # BLD: 3.95 M/UL — SIGNIFICANT CHANGE UP (ref 3.8–5.2)
RBC # FLD: 12.9 % — SIGNIFICANT CHANGE UP (ref 10.3–14.5)
SODIUM SERPL-SCNC: 136 MMOL/L — SIGNIFICANT CHANGE UP (ref 135–145)
SPECIMEN SOURCE: SIGNIFICANT CHANGE UP
WBC # BLD: 12.13 K/UL — HIGH (ref 3.8–10.5)
WBC # FLD AUTO: 12.13 K/UL — HIGH (ref 3.8–10.5)

## 2021-04-03 PROCEDURE — 99232 SBSQ HOSP IP/OBS MODERATE 35: CPT

## 2021-04-03 RX ORDER — ERTAPENEM SODIUM 1 G/1
1000 INJECTION, POWDER, LYOPHILIZED, FOR SOLUTION INTRAMUSCULAR; INTRAVENOUS EVERY 24 HOURS
Refills: 0 | Status: DISCONTINUED | OUTPATIENT
Start: 2021-04-04 | End: 2021-04-05

## 2021-04-03 RX ORDER — ACETAMINOPHEN 500 MG
650 TABLET ORAL EVERY 6 HOURS
Refills: 0 | Status: DISCONTINUED | OUTPATIENT
Start: 2021-04-03 | End: 2021-04-06

## 2021-04-03 RX ORDER — ERTAPENEM SODIUM 1 G/1
INJECTION, POWDER, LYOPHILIZED, FOR SOLUTION INTRAMUSCULAR; INTRAVENOUS
Refills: 0 | Status: DISCONTINUED | OUTPATIENT
Start: 2021-04-03 | End: 2021-04-05

## 2021-04-03 RX ORDER — ERTAPENEM SODIUM 1 G/1
1000 INJECTION, POWDER, LYOPHILIZED, FOR SOLUTION INTRAMUSCULAR; INTRAVENOUS ONCE
Refills: 0 | Status: COMPLETED | OUTPATIENT
Start: 2021-04-03 | End: 2021-04-03

## 2021-04-03 RX ADMIN — Medication 650 MILLIGRAM(S): at 21:59

## 2021-04-03 RX ADMIN — ATORVASTATIN CALCIUM 20 MILLIGRAM(S): 80 TABLET, FILM COATED ORAL at 21:56

## 2021-04-03 RX ADMIN — PANTOPRAZOLE SODIUM 40 MILLIGRAM(S): 20 TABLET, DELAYED RELEASE ORAL at 05:00

## 2021-04-03 RX ADMIN — BUDESONIDE AND FORMOTEROL FUMARATE DIHYDRATE 2 PUFF(S): 160; 4.5 AEROSOL RESPIRATORY (INHALATION) at 18:19

## 2021-04-03 RX ADMIN — Medication 650 MILLIGRAM(S): at 06:35

## 2021-04-03 RX ADMIN — Medication 650 MILLIGRAM(S): at 05:01

## 2021-04-03 RX ADMIN — Medication 40 MILLIEQUIVALENT(S): at 12:40

## 2021-04-03 RX ADMIN — ERTAPENEM SODIUM 120 MILLIGRAM(S): 1 INJECTION, POWDER, LYOPHILIZED, FOR SOLUTION INTRAMUSCULAR; INTRAVENOUS at 09:10

## 2021-04-03 RX ADMIN — BUDESONIDE AND FORMOTEROL FUMARATE DIHYDRATE 2 PUFF(S): 160; 4.5 AEROSOL RESPIRATORY (INHALATION) at 05:07

## 2021-04-03 RX ADMIN — Medication 81 MILLIGRAM(S): at 12:40

## 2021-04-03 RX ADMIN — Medication 50 MILLIGRAM(S): at 05:00

## 2021-04-03 NOTE — PROGRESS NOTE ADULT - ASSESSMENT
No pain, no shortness of breath    Review of systems: All 10 points ROS was obtained except as above.     acetaminophen   Tablet .. 650 milliGRAM(s) Oral every 6 hours PRN  aspirin  chewable 81 milliGRAM(s) Oral daily  atorvastatin 20 milliGRAM(s) Oral at bedtime  budesonide 160 MICROgram(s)/formoterol 4.5 MICROgram(s) Inhaler 2 Puff(s) Inhalation two times a day  ertapenem  IVPB      metoprolol succinate ER 50 milliGRAM(s) Oral daily  pantoprazole    Tablet 40 milliGRAM(s) Oral before breakfast  potassium chloride    Tablet ER 40 milliEquivalent(s) Oral daily  sodium chloride 0.9%. 1000 milliLiter(s) IV Continuous <Continuous>      VITAL:  T(C): , Max: 38.7 (04-03-21 @ 04:55)  T(F): , Max: 101.6 (04-03-21 @ 04:55)  HR: 73 (04-03-21 @ 11:39)  BP: 130/75 (04-03-21 @ 11:39)  BP(mean): --  RR: 18 (04-03-21 @ 11:39)  SpO2: 96% (04-03-21 @ 11:39)  Wt(kg): --    04-02-21 @ 07:01  -  04-03-21 @ 07:00  --------------------------------------------------------  IN: 720 mL / OUT: 0 mL / NET: 720 mL    04-03-21 @ 07:01  -  04-03-21 @ 11:41  --------------------------------------------------------  IN: 240 mL / OUT: 0 mL / NET: 240 mL        PHYSICAL EXAM:  Constitutional: NAD  HEENT: EOMI  Neck:  No JVD, supple   Respiratory: CTA B/L  Cardiovascular: S1 and S2, RRR  Gastrointestinal: + BS, soft, NT, ND  Extremities: No peripheral edema, + peripheral pulses  Neurological: A/O x 3, CN2-12 intact  Psychiatric: Normal mood, normal affect  : No Marques  Skin: No rashes, C/D/I  Access: Not applicable    LABS:                          11.9   12.13 )-----------( 167      ( 03 Apr 2021 07:24 )             35.0     Na(136)/K(4.6)/Cl(106)/HCO3(18)/BUN(24)/Cr(1.04)Glu(83)/Ca(8.7)/Mg(--)/PO4(--)    04-03 @ 07:24  Na(136)/K(4.2)/Cl(106)/HCO3(21)/BUN(33)/Cr(1.28)Glu(100)/Ca(8.3)/Mg(--)/PO4(--)    04-02 @ 06:20  Na(129)/K(3.3)/Cl(95)/HCO3(22)/BUN(38)/Cr(1.66)Glu(113)/Ca(8.4)/Mg(--)/PO4(--)    04-01 @ 06:32  Na(132)/K(3.4)/Cl(93)/HCO3(24)/BUN(41)/Cr(2.09)Glu(132)/Ca(9.0)/Mg(2.2)/PO4(2.5)    03-31 @ 16:57            ASSESSMENT/PLAN  75 F with pmhx htn, copd, hx COVID January 10th 2021 presents with nausea, diarrhea, and has pyelonephritis   Ecoli pyelonephritis at present   Mild CKD stage 3 or renal azotemia     1 Renal - No need for additional renal imaging  Cont IVF for another 24 hours  Trend serum creatinine  2 ID-IV abx at present and awaiting blood cx;  Course of abx likely total of 14 days  3 Misc-Standard DVT prophylaxis       Jose Petersen, NP-BC  [facility]  [phone]   Seen and examined at bedside .   States she can not hold her urine  Attests to h/a 3/10    acetaminophen   Tablet .. 650 milliGRAM(s) Oral every 6 hours PRN  aspirin  chewable 81 milliGRAM(s) Oral daily  atorvastatin 20 milliGRAM(s) Oral at bedtime  budesonide 160 MICROgram(s)/formoterol 4.5 MICROgram(s) Inhaler 2 Puff(s) Inhalation two times a day  ertapenem  IVPB      metoprolol succinate ER 50 milliGRAM(s) Oral daily  pantoprazole    Tablet 40 milliGRAM(s) Oral before breakfast  potassium chloride    Tablet ER 40 milliEquivalent(s) Oral daily  sodium chloride 0.9%. 1000 milliLiter(s) IV Continuous <Continuous>      VITAL:  T(C): , Max: 38.7 (04-03-21 @ 04:55)  T(F): , Max: 101.6 (04-03-21 @ 04:55)  HR: 73 (04-03-21 @ 11:39)  BP: 130/75 (04-03-21 @ 11:39)  BP(mean): --  RR: 18 (04-03-21 @ 11:39)  SpO2: 96% (04-03-21 @ 11:39)  Wt(kg): --    04-02-21 @ 07:01  -  04-03-21 @ 07:00  --------------------------------------------------------  IN: 720 mL / OUT: 0 mL / NET: 720 mL    04-03-21 @ 07:01  -  04-03-21 @ 11:41  --------------------------------------------------------  IN: 240 mL / OUT: 0 mL / NET: 240 mL        PHYSICAL EXAM:  Constitutional: NAD  HEENT: EOMI  Neck:  No JVD, supple   Respiratory: CTA B/L  Cardiovascular: S1 and S2, RRR  Gastrointestinal: + BS, soft, NT, ND  Extremities: No peripheral edema, + peripheral pulses  Neurological: A/O x 3, CN2-12 intact  Psychiatric: Normal mood, normal affect  : No Marques  Skin: No rashes, C/D/I  Access: Not applicable    LABS:                          11.9   12.13 )-----------( 167      ( 03 Apr 2021 07:24 )             35.0     Na(136)/K(4.6)/Cl(106)/HCO3(18)/BUN(24)/Cr(1.04)Glu(83)/Ca(8.7)/Mg(--)/PO4(--)    04-03 @ 07:24  Na(136)/K(4.2)/Cl(106)/HCO3(21)/BUN(33)/Cr(1.28)Glu(100)/Ca(8.3)/Mg(--)/PO4(--)    04-02 @ 06:20  Na(129)/K(3.3)/Cl(95)/HCO3(22)/BUN(38)/Cr(1.66)Glu(113)/Ca(8.4)/Mg(--)/PO4(--)    04-01 @ 06:32  Na(132)/K(3.4)/Cl(93)/HCO3(24)/BUN(41)/Cr(2.09)Glu(132)/Ca(9.0)/Mg(2.2)/PO4(2.5)    03-31 @ 16:57            ASSESSMENT/PLAN  75 F with pmhx htn, copd, hx COVID January 10th 2021 presents with nausea, diarrhea, and has pyelonephritis   Ecoli pyelonephritis at present   Mild CKD stage 3 or renal azotemia     1 Renal - Renal fxn resolving    2 Lytes- controlled    3 Anemia- controlled and aceptable    4 ID-IV abx at present and awaiting blood cx;  Course of abx likely total of 14 days    5 Misc-Standard DVT prophylaxis       Jose Petersen, NP-BC  [facility]  [phone]

## 2021-04-03 NOTE — PROGRESS NOTE ADULT - ASSESSMENT
Assessment:  he patient is a 75 year old female with past medical history of hypertension, hyperlipidemia, COPD, and history of kidney stones with COVID-19 infection on January 10th 2021 who presents with nausea, diarrhea, and headache for one day. She states that she has associated loss of appetite with decreased PO intake. She reports burning chest pain when she got to the emergency department. She was admitted to rule out ACS and found to have UTI on urinalysis. She was started on intravenous antibiotics.    Plan:  # Left sided acute pyelonephritis likely due to passed kidney stone   u;rine sensitives still pending  will change t cover esbl pending results

## 2021-04-04 RX ADMIN — Medication 50 MILLIGRAM(S): at 06:29

## 2021-04-04 RX ADMIN — Medication 81 MILLIGRAM(S): at 11:41

## 2021-04-04 RX ADMIN — Medication 650 MILLIGRAM(S): at 00:00

## 2021-04-04 RX ADMIN — Medication 650 MILLIGRAM(S): at 06:29

## 2021-04-04 RX ADMIN — ATORVASTATIN CALCIUM 20 MILLIGRAM(S): 80 TABLET, FILM COATED ORAL at 21:33

## 2021-04-04 RX ADMIN — Medication 650 MILLIGRAM(S): at 17:12

## 2021-04-04 RX ADMIN — Medication 40 MILLIEQUIVALENT(S): at 11:41

## 2021-04-04 RX ADMIN — PANTOPRAZOLE SODIUM 40 MILLIGRAM(S): 20 TABLET, DELAYED RELEASE ORAL at 06:30

## 2021-04-04 RX ADMIN — BUDESONIDE AND FORMOTEROL FUMARATE DIHYDRATE 2 PUFF(S): 160; 4.5 AEROSOL RESPIRATORY (INHALATION) at 06:25

## 2021-04-04 RX ADMIN — Medication 650 MILLIGRAM(S): at 18:17

## 2021-04-04 RX ADMIN — Medication 650 MILLIGRAM(S): at 07:37

## 2021-04-04 RX ADMIN — BUDESONIDE AND FORMOTEROL FUMARATE DIHYDRATE 2 PUFF(S): 160; 4.5 AEROSOL RESPIRATORY (INHALATION) at 21:34

## 2021-04-04 RX ADMIN — ERTAPENEM SODIUM 120 MILLIGRAM(S): 1 INJECTION, POWDER, LYOPHILIZED, FOR SOLUTION INTRAMUSCULAR; INTRAVENOUS at 09:17

## 2021-04-04 NOTE — PROGRESS NOTE ADULT - PROBLEM SELECTOR PLAN 1
CT abdomen shows Left perinephric stranding may represent pyelonephritis   Patient received Ceftriaxone x 1 dose. Continue with Ceftriaxone. Urine cx E.coli- follow up sensitivities. Follow  up blood cultures.  ID consult called, follow up recs.
CT abdomen shows Left perinephric stranding may represent pyelonephritis   Patient received Ceftriaxone x 1 dose. Continue with Ceftriaxone. Follow up urine and blood cultures.  ID consult called, follow up recs.
CT abdomen shows Left perinephric stranding may represent pyelonephritis   Patient received Ceftriaxone x 1 dose. Continue with Ceftriaxone. Urine cx E.coli- follow up sensitivities. Follow  up blood cultures.  ID consult appreciated.
CT abdomen shows Left perinephric stranding may represent pyelonephritis   Patient received Ceftriaxone x 1 dose. Continue with Ceftriaxone. Urine cx E.coli- follow up sensitivities. Follow  up blood cultures.  ID consult appreciated.

## 2021-04-04 NOTE — PROGRESS NOTE ADULT - PROBLEM SELECTOR PLAN 2
resoled- atypical chest pain, follow up CE x1.
atypical, resolved.

## 2021-04-04 NOTE — PROGRESS NOTE ADULT - ASSESSMENT
75 year old female with past medical history of hypertension, hyperlipidemia, COPD, and history of kidney stones with COVID-19 infection on January 10th 2021 who presents with nausea, diarrhea, and headache for one day. She states that she has associated loss of appetite with decreased PO intake.      SAE  possibly pre-renal   Renal function improved  MOnitor BMP   avoid further nephrotoxics, NSAIDS RCA    Hyponatremia  Improved  MOnitor serum NA    HYpokalemia  Improved  MOnitor serum K

## 2021-04-05 PROCEDURE — 99232 SBSQ HOSP IP/OBS MODERATE 35: CPT

## 2021-04-05 RX ORDER — HEPARIN SODIUM 5000 [USP'U]/ML
5000 INJECTION INTRAVENOUS; SUBCUTANEOUS EVERY 12 HOURS
Refills: 0 | Status: DISCONTINUED | OUTPATIENT
Start: 2021-04-05 | End: 2021-04-06

## 2021-04-05 RX ORDER — CEFTRIAXONE 500 MG/1
INJECTION, POWDER, FOR SOLUTION INTRAMUSCULAR; INTRAVENOUS
Refills: 0 | Status: DISCONTINUED | OUTPATIENT
Start: 2021-04-05 | End: 2021-04-06

## 2021-04-05 RX ORDER — CEFTRIAXONE 500 MG/1
1000 INJECTION, POWDER, FOR SOLUTION INTRAMUSCULAR; INTRAVENOUS ONCE
Refills: 0 | Status: COMPLETED | OUTPATIENT
Start: 2021-04-05 | End: 2021-04-05

## 2021-04-05 RX ORDER — CEFTRIAXONE 500 MG/1
1000 INJECTION, POWDER, FOR SOLUTION INTRAMUSCULAR; INTRAVENOUS EVERY 24 HOURS
Refills: 0 | Status: DISCONTINUED | OUTPATIENT
Start: 2021-04-06 | End: 2021-04-06

## 2021-04-05 RX ADMIN — HEPARIN SODIUM 5000 UNIT(S): 5000 INJECTION INTRAVENOUS; SUBCUTANEOUS at 17:25

## 2021-04-05 RX ADMIN — Medication 50 MILLIGRAM(S): at 06:07

## 2021-04-05 RX ADMIN — ERTAPENEM SODIUM 120 MILLIGRAM(S): 1 INJECTION, POWDER, LYOPHILIZED, FOR SOLUTION INTRAMUSCULAR; INTRAVENOUS at 09:02

## 2021-04-05 RX ADMIN — ATORVASTATIN CALCIUM 20 MILLIGRAM(S): 80 TABLET, FILM COATED ORAL at 21:48

## 2021-04-05 RX ADMIN — Medication 40 MILLIEQUIVALENT(S): at 12:04

## 2021-04-05 RX ADMIN — PANTOPRAZOLE SODIUM 40 MILLIGRAM(S): 20 TABLET, DELAYED RELEASE ORAL at 06:07

## 2021-04-05 RX ADMIN — Medication 81 MILLIGRAM(S): at 12:04

## 2021-04-05 RX ADMIN — BUDESONIDE AND FORMOTEROL FUMARATE DIHYDRATE 2 PUFF(S): 160; 4.5 AEROSOL RESPIRATORY (INHALATION) at 17:25

## 2021-04-05 RX ADMIN — CEFTRIAXONE 100 MILLIGRAM(S): 500 INJECTION, POWDER, FOR SOLUTION INTRAMUSCULAR; INTRAVENOUS at 13:13

## 2021-04-05 RX ADMIN — Medication 650 MILLIGRAM(S): at 22:23

## 2021-04-05 RX ADMIN — Medication 650 MILLIGRAM(S): at 21:50

## 2021-04-05 RX ADMIN — BUDESONIDE AND FORMOTEROL FUMARATE DIHYDRATE 2 PUFF(S): 160; 4.5 AEROSOL RESPIRATORY (INHALATION) at 06:09

## 2021-04-05 RX ADMIN — Medication 650 MILLIGRAM(S): at 09:32

## 2021-04-05 RX ADMIN — Medication 650 MILLIGRAM(S): at 09:02

## 2021-04-05 NOTE — PROGRESS NOTE ADULT - ASSESSMENT
Assessment:  he patient is a 75 year old female with past medical history of hypertension, hyperlipidemia, COPD, and history of kidney stones with COVID-19 infection on January 10th 2021 who presents with nausea, diarrhea, and headache for one day. She states that she has associated loss of appetite with decreased PO intake. She reports burning chest pain when she got to the emergency department. She was admitted to rule out ACS and found to have UTI on urinalysis. She was started on intravenous antibiotics.    Plan:  # Left sided acute pyelonephritis likely due to passed kidney stone   u;rine sensitives  noted pan sensitive  temps down last 24 hr.     pt has been incontinent of urine at times ?    can switch to ceftin 500 mg po bid to complete 10 days of ab therapy  urology workup for incontinence

## 2021-04-05 NOTE — PROGRESS NOTE ADULT - ASSESSMENT
75 F       with pmhx htn, copd, hx COVID January 10th 2021     presents with nausea, diarrhea, and headache      ct abdomen shows p/w pyelonephritis uti with  ecoli/ blood   c/s, negative   Pyelonephritis,  on  ertapenem/  duration  a s per ID   h/o urinary incontinence,  outpt  urology f/p     HTN. HLD   on asa,  toprol, statin  on  dvt oox  75 F       with pmhx htn, copd, hx COVID January 10th 2021     presents with nausea, diarrhea, and headache      ct abdomen shows p/w pyelonephritis uti with  ecoli/ blood   c/s, negative   Pyelonephritis,  on  ertapenem/  duration  as  per ID  rising wbc. pt  is afberile   cbc in am    h/o urinary incontinence,  outpt  urology f/p     HTN. HLD   on asa,  toprol, statin  on  dvt oox

## 2021-04-06 ENCOUNTER — TRANSCRIPTION ENCOUNTER (OUTPATIENT)
Age: 76
End: 2021-04-06

## 2021-04-06 VITALS
SYSTOLIC BLOOD PRESSURE: 121 MMHG | OXYGEN SATURATION: 97 % | HEART RATE: 86 BPM | RESPIRATION RATE: 18 BRPM | TEMPERATURE: 98 F | DIASTOLIC BLOOD PRESSURE: 74 MMHG

## 2021-04-06 LAB
ANION GAP SERPL CALC-SCNC: 13 MMOL/L — SIGNIFICANT CHANGE UP (ref 5–17)
BUN SERPL-MCNC: 18 MG/DL — SIGNIFICANT CHANGE UP (ref 7–23)
CALCIUM SERPL-MCNC: 9.7 MG/DL — SIGNIFICANT CHANGE UP (ref 8.4–10.5)
CHLORIDE SERPL-SCNC: 101 MMOL/L — SIGNIFICANT CHANGE UP (ref 96–108)
CO2 SERPL-SCNC: 21 MMOL/L — LOW (ref 22–31)
CREAT SERPL-MCNC: 0.96 MG/DL — SIGNIFICANT CHANGE UP (ref 0.5–1.3)
CULTURE RESULTS: SIGNIFICANT CHANGE UP
CULTURE RESULTS: SIGNIFICANT CHANGE UP
GLUCOSE SERPL-MCNC: 94 MG/DL — SIGNIFICANT CHANGE UP (ref 70–99)
HCT VFR BLD CALC: 36.4 % — SIGNIFICANT CHANGE UP (ref 34.5–45)
HGB BLD-MCNC: 12.2 G/DL — SIGNIFICANT CHANGE UP (ref 11.5–15.5)
MCHC RBC-ENTMCNC: 30 PG — SIGNIFICANT CHANGE UP (ref 27–34)
MCHC RBC-ENTMCNC: 33.5 GM/DL — SIGNIFICANT CHANGE UP (ref 32–36)
MCV RBC AUTO: 89.7 FL — SIGNIFICANT CHANGE UP (ref 80–100)
NRBC # BLD: 0 /100 WBCS — SIGNIFICANT CHANGE UP (ref 0–0)
PLATELET # BLD AUTO: 405 K/UL — HIGH (ref 150–400)
POTASSIUM SERPL-MCNC: 4.6 MMOL/L — SIGNIFICANT CHANGE UP (ref 3.5–5.3)
POTASSIUM SERPL-SCNC: 4.6 MMOL/L — SIGNIFICANT CHANGE UP (ref 3.5–5.3)
RBC # BLD: 4.06 M/UL — SIGNIFICANT CHANGE UP (ref 3.8–5.2)
RBC # FLD: 12.8 % — SIGNIFICANT CHANGE UP (ref 10.3–14.5)
SODIUM SERPL-SCNC: 135 MMOL/L — SIGNIFICANT CHANGE UP (ref 135–145)
SPECIMEN SOURCE: SIGNIFICANT CHANGE UP
SPECIMEN SOURCE: SIGNIFICANT CHANGE UP
WBC # BLD: 10.82 K/UL — HIGH (ref 3.8–10.5)
WBC # FLD AUTO: 10.82 K/UL — HIGH (ref 3.8–10.5)

## 2021-04-06 PROCEDURE — 80053 COMPREHEN METABOLIC PANEL: CPT

## 2021-04-06 PROCEDURE — 82550 ASSAY OF CK (CPK): CPT

## 2021-04-06 PROCEDURE — 85025 COMPLETE CBC W/AUTO DIFF WBC: CPT

## 2021-04-06 PROCEDURE — 97530 THERAPEUTIC ACTIVITIES: CPT

## 2021-04-06 PROCEDURE — 97116 GAIT TRAINING THERAPY: CPT

## 2021-04-06 PROCEDURE — 71045 X-RAY EXAM CHEST 1 VIEW: CPT

## 2021-04-06 PROCEDURE — 81001 URINALYSIS AUTO W/SCOPE: CPT

## 2021-04-06 PROCEDURE — 80048 BASIC METABOLIC PNL TOTAL CA: CPT

## 2021-04-06 PROCEDURE — 99285 EMERGENCY DEPT VISIT HI MDM: CPT | Mod: 25

## 2021-04-06 PROCEDURE — 87086 URINE CULTURE/COLONY COUNT: CPT

## 2021-04-06 PROCEDURE — 83735 ASSAY OF MAGNESIUM: CPT

## 2021-04-06 PROCEDURE — U0005: CPT

## 2021-04-06 PROCEDURE — 84484 ASSAY OF TROPONIN QUANT: CPT

## 2021-04-06 PROCEDURE — U0003: CPT

## 2021-04-06 PROCEDURE — 84100 ASSAY OF PHOSPHORUS: CPT

## 2021-04-06 PROCEDURE — 85027 COMPLETE CBC AUTOMATED: CPT

## 2021-04-06 PROCEDURE — 94640 AIRWAY INHALATION TREATMENT: CPT

## 2021-04-06 PROCEDURE — 87040 BLOOD CULTURE FOR BACTERIA: CPT

## 2021-04-06 PROCEDURE — 86769 SARS-COV-2 COVID-19 ANTIBODY: CPT

## 2021-04-06 PROCEDURE — 70450 CT HEAD/BRAIN W/O DYE: CPT

## 2021-04-06 PROCEDURE — 82553 CREATINE MB FRACTION: CPT

## 2021-04-06 PROCEDURE — 87186 SC STD MICRODIL/AGAR DIL: CPT

## 2021-04-06 PROCEDURE — 96374 THER/PROPH/DIAG INJ IV PUSH: CPT

## 2021-04-06 PROCEDURE — 83690 ASSAY OF LIPASE: CPT

## 2021-04-06 PROCEDURE — 74176 CT ABD & PELVIS W/O CONTRAST: CPT

## 2021-04-06 PROCEDURE — 97161 PT EVAL LOW COMPLEX 20 MIN: CPT

## 2021-04-06 RX ORDER — OXYBUTYNIN CHLORIDE 5 MG
1 TABLET ORAL
Qty: 30 | Refills: 0
Start: 2021-04-06 | End: 2021-05-05

## 2021-04-06 RX ORDER — CEFUROXIME AXETIL 250 MG
1 TABLET ORAL
Qty: 16 | Refills: 0
Start: 2021-04-06 | End: 2021-04-13

## 2021-04-06 RX ORDER — OXYBUTYNIN CHLORIDE 5 MG
5 TABLET ORAL DAILY
Refills: 0 | Status: DISCONTINUED | OUTPATIENT
Start: 2021-04-06 | End: 2021-04-06

## 2021-04-06 RX ORDER — ASPIRIN/CALCIUM CARB/MAGNESIUM 324 MG
1 TABLET ORAL
Qty: 30 | Refills: 0
Start: 2021-04-06 | End: 2021-05-05

## 2021-04-06 RX ADMIN — Medication 650 MILLIGRAM(S): at 12:37

## 2021-04-06 RX ADMIN — HEPARIN SODIUM 5000 UNIT(S): 5000 INJECTION INTRAVENOUS; SUBCUTANEOUS at 05:51

## 2021-04-06 RX ADMIN — CEFTRIAXONE 100 MILLIGRAM(S): 500 INJECTION, POWDER, FOR SOLUTION INTRAMUSCULAR; INTRAVENOUS at 10:58

## 2021-04-06 RX ADMIN — Medication 81 MILLIGRAM(S): at 10:59

## 2021-04-06 RX ADMIN — Medication 40 MILLIEQUIVALENT(S): at 10:59

## 2021-04-06 RX ADMIN — BUDESONIDE AND FORMOTEROL FUMARATE DIHYDRATE 2 PUFF(S): 160; 4.5 AEROSOL RESPIRATORY (INHALATION) at 05:52

## 2021-04-06 RX ADMIN — Medication 50 MILLIGRAM(S): at 05:47

## 2021-04-06 RX ADMIN — Medication 5 MILLIGRAM(S): at 12:03

## 2021-04-06 RX ADMIN — Medication 650 MILLIGRAM(S): at 12:07

## 2021-04-06 RX ADMIN — PANTOPRAZOLE SODIUM 40 MILLIGRAM(S): 20 TABLET, DELAYED RELEASE ORAL at 05:47

## 2021-04-06 NOTE — DISCHARGE NOTE NURSING/CASE MANAGEMENT/SOCIAL WORK - PATIENT PORTAL LINK FT
You can access the FollowMyHealth Patient Portal offered by [facility] by registering at the following website: [email or web address]. By joining [place]’s FollowMyHealth portal, you will also be able to view your health information using other applications (apps) compatible with our system.

## 2021-04-06 NOTE — PROGRESS NOTE ADULT - ASSESSMENT
75 year old female with past medical history of hypertension, hyperlipidemia, COPD, and history of kidney stones with COVID-19 infection on January 10th 2021 who presents with nausea, diarrhea, and headache for one day. She states that she has associated loss of appetite with decreased PO intake.    SAE  SAE pre-renal in the setting of infection   Renal function improved  MOnitor BMP   avoid further nephrotoxics, NSAIDS RCA    Hyponatremia  Improved  MOnitor serum NA    HYpokalemia  Improved  MOnitor serum K

## 2021-04-06 NOTE — PROGRESS NOTE ADULT - ASSESSMENT
75 F       with pmhx htn, copd, hx COVID January 10th 2021     presents with nausea, diarrhea, and headache      ct abdomen shows p/w pyelonephritis uti with  ecoli/ blood   c/s, negative   Pyelonephritis,  on   Rocephin,    duration  as  per ID   wbc  is 10,000/ . pt  is afberile   h/o   long term urinary incontinence,  outpt  urology f/p     HTN. HLD   on asa,  toprol, statin  on  dvt oox   await duration from ID 75 F       with pmhx htn, copd, hx COVID January 10th 2021     presents with nausea, diarrhea, and headache, resolved      ct abdomen shows  pyelonephritis / mike nephric  stranding    UTI, i with  ecoli/ blood   c/s, negative   Pyelonephritis,  on   Rocephin  now,    duration  as  per ID   wbc  is 10,000/ . pt  is afberile   h/o   long term urinary incontinence,  outpt  urology f/p     HTN. HLD   on asa,  toprol, statin  on  dvt oox   await duration from ID. and then will plan.  d/c 75 F       with pmhx htn, copd, hx COVID January 10th 2021     presents with nausea, diarrhea, and headache, resolved      ct abdomen shows  pyelonephritis / mike nephric  stranding    UTI, i with  ecoli/ blood   c/s, negative   Pyelonephritis,  on   Rocephin  now,    duration  as  per ID   wbc  is 10,000/ . pt  is afberile   h/o   long term urinary incontinence,  outpt  urology f/p     HTN. HLD   on asa,  toprol, statin  on  dvt oox   await duration from ID. and then will plan.  d/c  addendum  / per ID, ceftin  500 mg bid  for total of 2  weeks    f/p pcp, next  week of

## 2021-04-06 NOTE — PROGRESS NOTE ADULT - NSICDXPILOT_GEN_ALL_CORE
[place]

## 2021-04-06 NOTE — DIETITIAN INITIAL EVALUATION ADULT. - ADD RECOMMEND
Recommend continue low sodium diet 2/2hx of HTN. Educated pt on improtance of food safety and storing food at proper refrigerated or frozen temperatures. Discussed pt's reported issues with living situation to SW, SW is following pt.

## 2021-04-06 NOTE — DISCHARGE NOTE PROVIDER - HOSPITAL COURSE
patient is a 75 year old female with past medical history of hypertension, hyperlipidemia, COPD, and history of kidney stones with COVID-19 infection on January 10th 2021 who presents with nausea, diarrhea, and headache for one day. She states that she has associated loss of appetite with decreased PO intake. She reports burning chest pain when she got to the emergency department. She was admitted to rule out ACS and found to have UTI on urinalysis. She was started on intravenous antibiotics.    Plan:  Left sided acute pyelonephritis likely due to passed kidney stone   u;rine sensitives  noted pan sensitive  temps down last 24 hr.   pt has been incontinent of urine at times ?  can switch to ceftin 500 mg po bid to complete 10 days of ab therapy  urology workup for incontinence   DCP with med rec discussed with attending of record PT is patient is a 75 year old female with past medical history of hypertension, hyperlipidemia, COPD, and history of kidney stones with COVID-19 infection on January 10th 2021 who presents with nausea, diarrhea, and headache for one day. She states that she has associated loss of appetite with decreased PO intake. She reports burning chest pain when she got to the emergency department. admitted to rule out ACS and found to have UTI on urinalysis. She was started on intravenous antibiotics.  Left sided acute pyelonephritis likely due to passed kidney stone  ID Consulted   urine sensitives   pan sensitive  pt has been incontinent of urine at times rec follow with urology out patient   switch to ceftin 500 mg po bid to complete 10 days of ab therapy  urology workup for incontinence   DCP with med rec discussed with attending of record PT is cleared for dc home no needs

## 2021-04-06 NOTE — DISCHARGE NOTE PROVIDER - NSDCMRMEDTOKEN_GEN_ALL_CORE_FT
Advair Diskus 250 mcg-50 mcg inhalation powder: 1 puff(s) inhaled 2 times a day  atorvastatin 20 mg oral tablet: 1 tab(s) orally once a day (at bedtime)  losartan 50 mg oral tablet: 1 tab(s) orally once a day  metoprolol succinate 50 mg oral tablet, extended release: 1 tab(s) orally once a day  omeprazole 40 mg oral delayed release capsule: 1 cap(s) orally once a day  Ginger Sorto:    Advair Diskus 250 mcg-50 mcg inhalation powder: 1 puff(s) inhaled 2 times a day  aspirin 81 mg oral tablet, chewable: 1 tab(s) orally once a day  atorvastatin 20 mg oral tablet: 1 tab(s) orally once a day (at bedtime)  cefuroxime 500 mg oral tablet: 1 tab(s) orally 2 times a day   losartan 50 mg oral tablet: 1 tab(s) orally once a day  metoprolol succinate 50 mg oral tablet, extended release: 1 tab(s) orally once a day  omeprazole 40 mg oral delayed release capsule: 1 cap(s) orally once a day  oxybutynin 5 mg oral tablet: 1 tab(s) orally once a day  Rolling Walker:

## 2021-04-06 NOTE — DIETITIAN INITIAL EVALUATION ADULT. - OTHER INFO
Pt reports poor appetite but eating the best she can this morning. Denies GI distress. Pt denies recent weight changes, reports UBW of 150 pounds consistent with current wt. Pt reports issues with living situation, states "I don't know where I'm going after I leave" and claiming she did not have the ability to refrigerate food and was putting her delivered food "on the window sill." Pt requesting to speak with ALESSANDRO.

## 2021-04-06 NOTE — PROGRESS NOTE ADULT - PROVIDER SPECIALTY LIST ADULT
Hospitalist
Internal Medicine
Nephrology
Internal Medicine
Nephrology
Infectious Disease
Hospitalist

## 2021-04-06 NOTE — DISCHARGE NOTE PROVIDER - CARE PROVIDER_API CALL
Marija Garnica)  Internal Medicine  [address] [facility], [address]st [place] [address]  Phone: [phone]  Fax: [phone]  Follow Up Time:

## 2021-04-06 NOTE — DIETITIAN INITIAL EVALUATION ADULT. - ORAL INTAKE PTA/DIET HISTORY
Pt reports receiving meal delivery from [facility] government assistance program, however reports issues with living situation which hinders her ability to safely store delivered meals.

## 2021-04-06 NOTE — DISCHARGE NOTE PROVIDER - NSDCCPCAREPLAN_GEN_ALL_CORE_FT
PRINCIPAL DISCHARGE DIAGNOSIS  Diagnosis: Pyelonephritis  Assessment and Plan of Treatment: You had a bladder &/or kidney infection  Call your doctor with pain or burning with urination, frequent urination, feeling to urinate suddenly, blood in urine, fever, back pain, nausea or vomiting  You need to take and finish your antibiotic as prescribed so that the infection does not reoccur  Drink adequate fluids - there is no harm to try cranberry juice  Take meds as directed   Follow up with Urology

## 2021-04-06 NOTE — DIETITIAN INITIAL EVALUATION ADULT. - CHIEF COMPLAINT
The patient is a 75y female complaining of nausea, headache, diarrhea. Found to have pyelonephritis UTI with ecoli/blood. On antibiotics per ID. Skin: No pressure injuries.

## 2021-04-13 NOTE — PROGRESS NOTE ADULT - PROBLEM/PLAN-3
Psych: Oriented x 3. No anxiety or agitation. Scheduled Meds:   aspirin  81 mg Oral Daily    atorvastatin  80 mg Oral Nightly    [Held by provider] clopidogrel  75 mg Oral Daily    [Held by provider] metoprolol tartrate  25 mg Oral BID    sertraline  100 mg Oral Daily    traZODone  100 mg Oral Nightly    metroNIDAZOLE  500 mg Intravenous Q8H    sodium chloride flush  5-40 mL Intravenous 2 times per day    [Held by provider] enoxaparin  40 mg Subcutaneous Daily    cefTRIAXone (ROCEPHIN) IV  1,000 mg Intravenous Q24H       Continuous Infusions:   sodium chloride         PRN Meds:  albuterol, sodium chloride flush, sodium chloride, promethazine **OR** ondansetron, polyethylene glycol, acetaminophen **OR** acetaminophen, morphine, ipratropium      Data:  CBC:   Recent Labs     04/11/21  0502 04/11/21  2241 04/12/21  0257   WBC 8.9 10.6  --    HGB 12.8* 12.7* 12.8*   HCT 38.2* 37.8* 38.5*   MCV 88.2 88.4  --     162  --      BMP:   Recent Labs     04/11/21  0502      K 4.5      CO2 29   BUN 14   CREATININE 1.0     LIVER PROFILE:   No results for input(s): AST, ALT, LIPASE, BILIDIR, BILITOT, ALKPHOS in the last 72 hours. Invalid input(s): AMYLASE,  ALB  PT/INR: No results for input(s): PROTIME, INR in the last 72 hours. CULTURES  COVID 19: not detected  Influenza a/b: not detected      RADIOLOGY  NM Cardiac Stress Test Nuclear Imaging   Final Result      CT ABDOMEN PELVIS WO CONTRAST Additional Contrast? None   Final Result   Acute, uncomplicated diverticulitis of the sigmoid colon. Nonobstructive left nephrolithiasis.          XR CHEST PORTABLE   Final Result   No acute cardiopulmonary disease             Assessment/Plan:    #Acute uncomplicated diverticulitis  -Continue IV antibiotics Rocephin and flagyl D#5  Resolving   Now on full liquid diet - advance as tolerated    #DENIA- resolved   - suspect pre renal with lasix use (dose increased to 80 mg BID by cardio on 3/3/21
2/2 patient's complaints of LEE), as well as intermittent vomiting, acute illness, and use of lisinopril  - no obstruction on CT  IVF  - Baseline Crt 0.8 (2/1/21). Crt 2.6 on admit-> 2.2-- 1.1- > 1.0  - Hold lasix, lisinopril  D/c IVF     #CAD sp CABG  #Chest pain- suspect MSK etiology   Bradyarrhythmia  - EKG is not ischemic. Initial trop neg, check one more  - Cont ASA, plavix,  Statin  -Episode of asystole and Mobitz type I heart block on 4/11/2021  - monitor on tele  D/c toprol given heart block periodically seen on monitor   lexiscan stress test abnormal. Needs cath  Continue statins, resume aspirin    #Ischemic Cardiomyopathy  #Chronic systolic CHF  - EF 22-10%  - he is on Lasix 80mg BID at home- held with DENIA  - hold lisinopril with DENIA  -Hold toprol due to bradyarrhythmia  - monitor fluid status closely    #COPD  - stopped smoking after his CABG in Sept 2020  - no exacerbation currently- cont nebulizers    #OANH  - not compliant with CPAP per chart review    #Morbid Obesity  - Body mass index is 48.63 kg/m². - Complicating assessment and treatment. Placing patient at risk for multiple co-morbidities as well as early death and contributing to the patient's presentation.   - Counseled on weight loss. DVT Prophylaxis: Lovenox   Diet: DIET CARDIAC; No Caffeine  Diet NPO, After Midnight  Code Status: Full Code       Stress test came back positive. patient  needs  cardiac cath. discussed with cardiology.    plan is for cardiac cath in a.m.   keep n.p.o. after midnight    Zeyad Sanon MD   4/13/2021
DISPLAY PLAN FREE TEXT

## 2021-11-16 ENCOUNTER — APPOINTMENT (OUTPATIENT)
Dept: OPHTHALMOLOGY | Facility: CLINIC | Age: 76
End: 2021-11-16

## 2022-01-20 NOTE — PROGRESS NOTE ADULT - PROBLEM SELECTOR PROBLEM 3
Essential hypertension
<-- Click to add NO significant Past Surgical History

## 2022-02-15 ENCOUNTER — NON-APPOINTMENT (OUTPATIENT)
Age: 77
End: 2022-02-15

## 2022-02-15 ENCOUNTER — APPOINTMENT (OUTPATIENT)
Dept: OPHTHALMOLOGY | Facility: CLINIC | Age: 77
End: 2022-02-15
Payer: MEDICARE

## 2022-02-15 PROCEDURE — 92133 CPTRZD OPH DX IMG PST SGM ON: CPT

## 2022-02-15 PROCEDURE — 92014 COMPRE OPH EXAM EST PT 1/>: CPT

## 2022-02-15 PROCEDURE — 92083 EXTENDED VISUAL FIELD XM: CPT

## 2022-02-18 ENCOUNTER — EMERGENCY (EMERGENCY)
Facility: HOSPITAL | Age: 77
LOS: 1 days | Discharge: HOME | End: 2022-02-18
Attending: EMERGENCY MEDICINE
Payer: COMMERCIAL

## 2022-02-18 VITALS
HEART RATE: 82 BPM | DIASTOLIC BLOOD PRESSURE: 55 MMHG | OXYGEN SATURATION: 98 % | TEMPERATURE: 98 F | SYSTOLIC BLOOD PRESSURE: 105 MMHG | RESPIRATION RATE: 20 BRPM

## 2022-02-18 VITALS
SYSTOLIC BLOOD PRESSURE: 110 MMHG | DIASTOLIC BLOOD PRESSURE: 71 MMHG | TEMPERATURE: 98 F | HEART RATE: 78 BPM | OXYGEN SATURATION: 96 % | RESPIRATION RATE: 20 BRPM | HEIGHT: 60 IN

## 2022-02-18 PROCEDURE — 99283 EMERGENCY DEPT VISIT LOW MDM: CPT

## 2022-02-18 RX ORDER — ACETAMINOPHEN 500 MG
2 TABLET ORAL
Qty: 24 | Refills: 0
Start: 2022-02-18 | End: 2022-02-21

## 2022-02-18 RX ORDER — LIDOCAINE 4 G/100G
1 CREAM TOPICAL ONCE
Refills: 0 | Status: COMPLETED | OUTPATIENT
Start: 2022-02-18 | End: 2022-02-18

## 2022-02-18 RX ORDER — DIAZEPAM 5 MG
1 TABLET ORAL
Qty: 3 | Refills: 0
Start: 2022-02-18 | End: 2022-02-20

## 2022-02-18 RX ORDER — ACETAMINOPHEN 500 MG
975 TABLET ORAL ONCE
Refills: 0 | Status: COMPLETED | OUTPATIENT
Start: 2022-02-18 | End: 2022-02-18

## 2022-02-18 RX ORDER — DIAZEPAM 5 MG
5 TABLET ORAL ONCE
Refills: 0 | Status: DISCONTINUED | OUTPATIENT
Start: 2022-02-18 | End: 2022-02-18

## 2022-02-18 RX ADMIN — LIDOCAINE 1 PATCH: 4 CREAM TOPICAL at 11:56

## 2022-02-18 RX ADMIN — Medication 5 MILLIGRAM(S): at 11:56

## 2022-02-18 RX ADMIN — Medication 975 MILLIGRAM(S): at 11:56

## 2022-02-18 NOTE — ED ADULT NURSE NOTE - NSIMPLEMENTINTERV_GEN_ALL_ED
Implemented All Universal Safety Interventions:  [place] to call system. Call bell, personal items and telephone within reach. Instruct patient to call for assistance. Room bathroom lighting operational. Non-slip footwear when patient is off stretcher. Physically safe environment: no spills, clutter or unnecessary equipment. Stretcher in lowest position, wheels locked, appropriate side rails in place.

## 2022-02-18 NOTE — ED PROVIDER NOTE - NSFOLLOWUPCLINICS_GEN_ALL_ED_FT
[facility] Orthopedic Surgery  Orthopedic Surgery  [address], 3rd & 4th [place] [address]  Phone: [phone]  Fax:     [facility] Specialty [facility]  Neurology  [address], [facility] [address] [place] [address]  Phone: [phone]  Fax:

## 2022-02-18 NOTE — ED PROVIDER NOTE - PHYSICAL EXAMINATION
T(C): 36.7 (18 Feb 2022 10:32), Max: 36.7 (18 Feb 2022 10:32)  T(F): 98.1 (18 Feb 2022 10:32), Max: 98.1 (18 Feb 2022 10:32)  HR: 78 (18 Feb 2022 10:32) (78 - 78)  BP: 110/71 (18 Feb 2022 10:32) (110/71 - 110/71)  RR: 20 (18 Feb 2022 10:32) (20 - 20)  SpO2: 96% (18 Feb 2022 10:32) (96% - 96%)      GENERAL: Awake. Alert. NAD. Well nourished.  HEENT: NC/AT, PERRL, Conjunctiva pink, no scleral icterus. Airway patent. Moist mucous membranes.  LUNGS: CTAB. No wheezes or rales noted.  CARDIAC: Chest non-tender to palpation. RRR. S1 and S2 intact. No murmurs noted.  ABDOMEN: No masses noted. Soft, NT, ND, no rebound, no guarding.  BACK: No midline spinal tenderness. Neck ROM limited due to pt effort secondary to pain.  EXT: No edema, no calf tenderness, distal pulses 2+ bilaterally  NEURO: A&Ox3. Moving all extremities. Sensation and strength intact throughout. No focal deficits.  SKIN: Warm and dry.   PSYCH: Normal affect.

## 2022-02-18 NOTE — ED PROVIDER NOTE - PATIENT PORTAL LINK FT
You can access the FollowMyHealth Patient Portal offered by [facility] by registering at the following website: [email or web address]. By joining [facility]’s FollowMyHealth portal, you will also be able to view your health information using other applications (apps) compatible with our system.

## 2022-02-18 NOTE — ED PROVIDER NOTE - OBJECTIVE STATEMENT
76F PMH HTN, HLD, COPD complaining of 2 days of b/l non-radiating neck pain after lying down on a bed with no pillow. Pt did not take any pain medication at home. Pt denies recent fall, trauma, prior injury to the area. Denies numbness/tingling, headache, blurred vision. Denies urinary/fecal incontinence, urinary retention, constipation. Denies chest pain, SOB, n/v. No fever, chills.

## 2022-02-18 NOTE — ED ADULT NURSE NOTE - OBJECTIVE STATEMENT
77 yo F presents to ED A+Ox3 via wheelchair from waiting room c/o neck pain x3 days. States the pain started after laying down in bed with no pillow. Denies any fall/trauma. Denies numbness, tingling, weakness, chest pain, SOB, abdominal pain. Patient reports difficulty with ROM of neck due to pain. Breathing spontaneous and unlabored on room air. Skin warm dry and of color appropriate for ethnicity. Moves all extremities. Bed in lowest position, side rails up.

## 2022-02-18 NOTE — ED PROVIDER NOTE - PROGRESS NOTE DETAILS
Jessenia Valdes DO (PGY1): Pt resting comfortably in bed. Feels better with pain medication. Improvement in neck ROM. Pt able to ambulate. Pt ready to go home. Plan discussed with patient. Questions regarding their symptoms were addressed. Advised to follow up with primary care doctor and neurologist for physical therapy and MRI. Given strict return precautions. Pt verbalized understanding.

## 2022-02-18 NOTE — ED PROVIDER NOTE - CLINICAL SUMMARY MEDICAL DECISION MAKING FREE TEXT BOX
76F PMH HTN, HLD, COPD complaining of 2 days of b/l non-radiating neck pain after lying down on a bed with no pillow. Given hx and physical, likely muscle spasm vs muscle strain. Unlikely herniated disc or ligamentous injury or fracture given mechanism, non radiating pain, and no midline TTP. Will provide pain medication and reassess and likely dc with pcp follow up for PT and possible MRI if continued pain. 76F PMH HTN, HLD, COPD complaining of 2 days of b/l non-radiating neck pain after lying down on a bed with no pillow. Given hx and physical, likely muscle spasm vs muscle strain. Unlikely herniated disc or ligamentous injury or fracture given mechanism, non radiating pain, and no midline TTP. No focal deficits. No concern for meningitis given no headache, fever, chills, afebrile, no AMS. Will provide pain medication and reassess and likely dc with pcp follow up for PT and possible MRI if continued pain.

## 2022-02-18 NOTE — ED PROVIDER NOTE - NSFOLLOWUPINSTRUCTIONS_ED_ALL_ED_FT
You were seen in the emergency department for neck pain.   You were given tylenol, lidocaine patch and valium.   Please follow up with primary care doctor and neurologist. You may need physical therapy or an MRI outside the hospital.   A prescription for was sent to your pharmacy for valium, please take it as prescribed.   For pain you can take acetaminophen according to the bottle instructions.   If you need a primary care doctor you can call: [phone]     SEEK IMMEDIATE MEDICAL CARE IF YOU HAVE ANY OF THE FOLLOWING SYMPTOMS: bowel or bladder control problems, unusual weakness or numbness in your arms or legs, nausea or vomiting, abdominal pain, fever, dizziness/lightheadedness.

## 2022-05-12 ENCOUNTER — EMERGENCY (EMERGENCY)
Facility: HOSPITAL | Age: 77
LOS: 1 days | Discharge: HOME | End: 2022-05-12
Attending: EMERGENCY MEDICINE
Payer: COMMERCIAL

## 2022-05-12 VITALS
HEIGHT: 60 IN | SYSTOLIC BLOOD PRESSURE: 152 MMHG | HEART RATE: 100 BPM | OXYGEN SATURATION: 95 % | TEMPERATURE: 99 F | RESPIRATION RATE: 20 BRPM | DIASTOLIC BLOOD PRESSURE: 81 MMHG | WEIGHT: 141.1 LBS

## 2022-05-12 PROCEDURE — 99284 EMERGENCY DEPT VISIT MOD MDM: CPT

## 2022-05-12 NOTE — ED ADULT TRIAGE NOTE - HEART RATE (BEATS/MIN)
Patient notified.  Patient had not completed labs other than INR and suggested I call RN at [facility] to ensure they got the order.  Call [phone] and ask for nurse.     Called nurse at [facility].  Will have labs drawn on Monday, 8/10.  Should have results same day and will fax results when received.    FYI to clinical; watch for results next week.       100

## 2022-05-13 VITALS
OXYGEN SATURATION: 98 % | SYSTOLIC BLOOD PRESSURE: 118 MMHG | RESPIRATION RATE: 18 BRPM | HEART RATE: 93 BPM | DIASTOLIC BLOOD PRESSURE: 89 MMHG

## 2022-05-13 LAB
ALBUMIN SERPL ELPH-MCNC: 4.3 G/DL — SIGNIFICANT CHANGE UP (ref 3.3–5)
ALP SERPL-CCNC: 76 U/L — SIGNIFICANT CHANGE UP (ref 40–120)
ALT FLD-CCNC: 30 U/L — SIGNIFICANT CHANGE UP (ref 10–45)
ANION GAP SERPL CALC-SCNC: 14 MMOL/L — SIGNIFICANT CHANGE UP (ref 5–17)
APPEARANCE UR: CLEAR — SIGNIFICANT CHANGE UP
AST SERPL-CCNC: 39 U/L — SIGNIFICANT CHANGE UP (ref 10–40)
BACTERIA # UR AUTO: ABNORMAL
BASOPHILS # BLD AUTO: 0.05 K/UL — SIGNIFICANT CHANGE UP (ref 0–0.2)
BASOPHILS NFR BLD AUTO: 0.6 % — SIGNIFICANT CHANGE UP (ref 0–2)
BILIRUB SERPL-MCNC: 0.6 MG/DL — SIGNIFICANT CHANGE UP (ref 0.2–1.2)
BILIRUB UR-MCNC: NEGATIVE — SIGNIFICANT CHANGE UP
BUN SERPL-MCNC: 12 MG/DL — SIGNIFICANT CHANGE UP (ref 7–23)
CALCIUM SERPL-MCNC: 9.2 MG/DL — SIGNIFICANT CHANGE UP (ref 8.4–10.5)
CHLORIDE SERPL-SCNC: 103 MMOL/L — SIGNIFICANT CHANGE UP (ref 96–108)
CO2 SERPL-SCNC: 21 MMOL/L — LOW (ref 22–31)
COLOR SPEC: SIGNIFICANT CHANGE UP
CREAT SERPL-MCNC: 0.95 MG/DL — SIGNIFICANT CHANGE UP (ref 0.5–1.3)
DIFF PNL FLD: ABNORMAL
EGFR: 61 ML/MIN/1.73M2 — SIGNIFICANT CHANGE UP
EOSINOPHIL # BLD AUTO: 0.07 K/UL — SIGNIFICANT CHANGE UP (ref 0–0.5)
EOSINOPHIL NFR BLD AUTO: 0.9 % — SIGNIFICANT CHANGE UP (ref 0–6)
EPI CELLS # UR: 1 /HPF — SIGNIFICANT CHANGE UP
GLUCOSE SERPL-MCNC: 116 MG/DL — HIGH (ref 70–99)
GLUCOSE UR QL: NEGATIVE — SIGNIFICANT CHANGE UP
HCT VFR BLD CALC: 36.6 % — SIGNIFICANT CHANGE UP (ref 34.5–45)
HGB BLD-MCNC: 12.5 G/DL — SIGNIFICANT CHANGE UP (ref 11.5–15.5)
IMM GRANULOCYTES NFR BLD AUTO: 0.3 % — SIGNIFICANT CHANGE UP (ref 0–1.5)
KETONES UR-MCNC: NEGATIVE — SIGNIFICANT CHANGE UP
LEUKOCYTE ESTERASE UR-ACNC: ABNORMAL
LYMPHOCYTES # BLD AUTO: 2.05 K/UL — SIGNIFICANT CHANGE UP (ref 1–3.3)
LYMPHOCYTES # BLD AUTO: 25.8 % — SIGNIFICANT CHANGE UP (ref 13–44)
MCHC RBC-ENTMCNC: 30.8 PG — SIGNIFICANT CHANGE UP (ref 27–34)
MCHC RBC-ENTMCNC: 34.2 GM/DL — SIGNIFICANT CHANGE UP (ref 32–36)
MCV RBC AUTO: 90.1 FL — SIGNIFICANT CHANGE UP (ref 80–100)
MONOCYTES # BLD AUTO: 0.71 K/UL — SIGNIFICANT CHANGE UP (ref 0–0.9)
MONOCYTES NFR BLD AUTO: 8.9 % — SIGNIFICANT CHANGE UP (ref 2–14)
NEUTROPHILS # BLD AUTO: 5.06 K/UL — SIGNIFICANT CHANGE UP (ref 1.8–7.4)
NEUTROPHILS NFR BLD AUTO: 63.5 % — SIGNIFICANT CHANGE UP (ref 43–77)
NITRITE UR-MCNC: POSITIVE
NRBC # BLD: 0 /100 WBCS — SIGNIFICANT CHANGE UP (ref 0–0)
PH UR: 7 — SIGNIFICANT CHANGE UP (ref 5–8)
PLATELET # BLD AUTO: 200 K/UL — SIGNIFICANT CHANGE UP (ref 150–400)
POTASSIUM SERPL-MCNC: 4.2 MMOL/L — SIGNIFICANT CHANGE UP (ref 3.5–5.3)
POTASSIUM SERPL-SCNC: 4.2 MMOL/L — SIGNIFICANT CHANGE UP (ref 3.5–5.3)
PROCALCITONIN SERPL-MCNC: 0.06 NG/ML — SIGNIFICANT CHANGE UP (ref 0.02–0.1)
PROT SERPL-MCNC: 7 G/DL — SIGNIFICANT CHANGE UP (ref 6–8.3)
PROT UR-MCNC: NEGATIVE — SIGNIFICANT CHANGE UP
RAPID RVP RESULT: DETECTED
RBC # BLD: 4.06 M/UL — SIGNIFICANT CHANGE UP (ref 3.8–5.2)
RBC # FLD: 11.9 % — SIGNIFICANT CHANGE UP (ref 10.3–14.5)
RBC CASTS # UR COMP ASSIST: 5 /HPF — HIGH (ref 0–4)
SARS-COV-2 RNA SPEC QL NAA+PROBE: DETECTED
SODIUM SERPL-SCNC: 138 MMOL/L — SIGNIFICANT CHANGE UP (ref 135–145)
SP GR SPEC: 1.01 — LOW (ref 1.01–1.02)
UROBILINOGEN FLD QL: NEGATIVE — SIGNIFICANT CHANGE UP
WBC # BLD: 7.96 K/UL — SIGNIFICANT CHANGE UP (ref 3.8–10.5)
WBC # FLD AUTO: 7.96 K/UL — SIGNIFICANT CHANGE UP (ref 3.8–10.5)
WBC UR QL: 16 /HPF — HIGH (ref 0–5)

## 2022-05-13 PROCEDURE — 84145 PROCALCITONIN (PCT): CPT

## 2022-05-13 PROCEDURE — 85025 COMPLETE CBC W/AUTO DIFF WBC: CPT

## 2022-05-13 PROCEDURE — 71045 X-RAY EXAM CHEST 1 VIEW: CPT | Mod: 26

## 2022-05-13 PROCEDURE — 81001 URINALYSIS AUTO W/SCOPE: CPT

## 2022-05-13 PROCEDURE — 99285 EMERGENCY DEPT VISIT HI MDM: CPT | Mod: 25

## 2022-05-13 PROCEDURE — 87086 URINE CULTURE/COLONY COUNT: CPT

## 2022-05-13 PROCEDURE — 0225U NFCT DS DNA&RNA 21 SARSCOV2: CPT

## 2022-05-13 PROCEDURE — 96374 THER/PROPH/DIAG INJ IV PUSH: CPT

## 2022-05-13 PROCEDURE — 87186 SC STD MICRODIL/AGAR DIL: CPT

## 2022-05-13 PROCEDURE — 71045 X-RAY EXAM CHEST 1 VIEW: CPT

## 2022-05-13 PROCEDURE — 80053 COMPREHEN METABOLIC PANEL: CPT

## 2022-05-13 RX ORDER — CEFTRIAXONE 500 MG/1
1000 INJECTION, POWDER, FOR SOLUTION INTRAMUSCULAR; INTRAVENOUS ONCE
Refills: 0 | Status: COMPLETED | OUTPATIENT
Start: 2022-05-13 | End: 2022-05-13

## 2022-05-13 RX ORDER — CEFUROXIME AXETIL 250 MG
1 TABLET ORAL
Qty: 20 | Refills: 0
Start: 2022-05-13 | End: 2022-05-22

## 2022-05-13 RX ORDER — ACETAMINOPHEN 500 MG
975 TABLET ORAL ONCE
Refills: 0 | Status: COMPLETED | OUTPATIENT
Start: 2022-05-13 | End: 2022-05-13

## 2022-05-13 RX ADMIN — CEFTRIAXONE 100 MILLIGRAM(S): 500 INJECTION, POWDER, FOR SOLUTION INTRAMUSCULAR; INTRAVENOUS at 03:05

## 2022-05-13 RX ADMIN — Medication 975 MILLIGRAM(S): at 02:15

## 2022-05-13 NOTE — ED PROVIDER NOTE - PHYSICAL EXAMINATION
GEN - NAD; non-toxic; A+Ox3, speaking full sentences, steady gait   HENT - NC/AT, No visible Ecchymosis, No Abrasions, No Lac/Tears, MMM, no discharge  EYES - EOMI, no conjunctival pallor, no scleral icterus  NECK - Neck supple, No LAD, No Swelling  PULM - CTA B/L,  symmetric breath sounds  CV -  S1 S2, no murmurs 2+ Pulses B/L UE  GI - (-) Jackson's, (-) Rovsings, (-) McBurneys; NT/ND, soft, no guarding, no rebound, no masses    MSK/EXT- no CVA tenderness; no edema, no gross deformity, warm and well perfused, no calf tenderness/swelling/erythema   SKIN - no rash or bruising  NEUROLOGIC - alert, moving all 4 ext with 5/5 Strength

## 2022-05-13 NOTE — ED POST DISCHARGE NOTE - ADDITIONAL DOCUMENTATION
pt called spoke to rep, requested info concerning positive covid result. picked up call but bad reception, no one on line.

## 2022-05-13 NOTE — ED PROVIDER NOTE - CLINICAL SUMMARY MEDICAL DECISION MAKING FREE TEXT BOX
76 female, Hx: HTN, HLD, UTI - presents with 2 days of tactile fevers and 3 days of nonproductive cough. Exam (Tachy, low grade fever, CTA B/L, Abd Soft/NT/ND, No CVA TTP, neck supple, no rashes, AO x 3), presentation, and history concerning for Viral URI vs. PNA vs. UTI - eval is NOT consistent with meningitis/encephalitis (well appearing, subjective fevers, neck supple, no rashes, no recent travel), appendicitis/cholecystitis/pancreatitis (Abd Soft, NT, ND, no focal TTP, neg reich/mcburneys). Plan: CBC, CMP, EKG, CXR, Flu/COVID, UA, UCx, Re-eval. VSS, non-toxic. ML 4

## 2022-05-13 NOTE — ED PROVIDER NOTE - NSFOLLOWUPINSTRUCTIONS_ED_ALL_ED_FT
You were seen and evaluated in the Emergency Department for your symptoms. You were evaluated clinically and with laboratory studies.    You have a Urinary Tract Infection; we have provided you with your first dose of antibiotics, however you must  your prescription at the pharmacy you specified to complete the course of treatment. Follow the instructions as provided on the prescription.     At this time your clinical evaluation and history do not demonstrate any acute, life-threatening medical conditions warranting emergent treatment. However, we strongly recommend you follow up with one of our Urology consultants (or your own) for further evaluation of your symptoms by calling the following number to make an appointment:    [facility] - Urology  Urology  [address], 3rd & 4th [place] [address]  Phone: [phone]    Should you develop new or worsening; urinary retention, abdominal pain, fevers, chills, nausea, vomiting, diarrhea, or constipation - please return to the ED for immediate evaluation.     We also strongly encourage you make an appointment with your Primary Care Physician for a comprehensive evaluation of your health.

## 2022-05-13 NOTE — ED PROVIDER NOTE - PATIENT PORTAL LINK FT
You can access the FollowMyHealth Patient Portal offered by [facility] by registering at the following website: [email or web address]. By joining [facility]’s FollowMyHealth portal, you will also be able to view your health information using other applications (apps) compatible with our system.

## 2022-05-13 NOTE — ED PROVIDER NOTE - PROGRESS NOTE DETAILS
Resident Joselyn: pt re-evaluated clinically, comfortable on re-exam. Preliminary evaluation without any acute, actionable pathology - UA consistent with UTI, s/p IV Abx and Rx sent for outpatient course. Pt is AO x 3, tolerating PO intake, without any nausea/vomiting, with stable gait - will discharge home with followup care.

## 2022-05-13 NOTE — ED PROVIDER NOTE - ATTENDING WITH...
Friend picked up: form.   Identity was verified: Yes Dl verified, Henry Dee will take rx elsewhere, 01/30/17 12:10 ANR.    Resident

## 2022-05-13 NOTE — ED ADULT NURSE NOTE - OBJECTIVE STATEMENT
76 year old Female, PMH: HLD, HTN, COPD. Patient comes to the ER from home for productive cough x3 days, fever today. Patient reports chest pain from cough. Decreased PO intake, reports urinary frequency. A&Ox4, breathing spontaneous and unlabored, palpable pulses, skin color normal for ethnicity, ambulatory unassisted with steady gait noted. Bed locked and in lowest position for safety.

## 2022-05-13 NOTE — ED PROVIDER NOTE - NS ED ROS FT
Constitution: (+) Subjective Fever or chills, No Weight Loss,   Eyes: No visual changes  HEENT: (+) cough, No Discharge, No Rhinorrhea, No URI symptoms  Cardio: No Chest pain, No Palpitations, No Dyspnea  Resp: No SOB, No Wheezing  GI: No abdominal pain, No Nausea, No Vomiting, No Constipation, No Diarrhea  : No burning upon urination, trouble urinating, no foul odor from urine  MSK: No Back pain, No Numbness, No Tingling, No Weakness  Neuro: No Headache, No changes to Vision, No changes to Hearing, Normal Gait  Skin: No rashes, No Bruising, No Swelling

## 2022-05-13 NOTE — ED PROVIDER NOTE - OBJECTIVE STATEMENT
76 female, Hx: HTN, HLD, UTI - presents with 2 days of tactile fevers and 3 days of nonproductive cough. Denies any CP, SOB, abdominal pain, nausea, vomiting, diarrhea, bloody stools, dysuric symptoms, flank pain. Denies any back pain, numbness/tingling/weakness in peripheral ext, denies any rashes. Denies any recent travel, hospitalizations/surgeries. Prior Urine Cultures reviewed to grow out E. Coli - pansensitive.

## 2022-05-13 NOTE — ED PROVIDER NOTE - CARE PLAN
1 Principal Discharge DX:	Fever  Secondary Diagnosis:	Urinary tract infection   Principal Discharge DX:	COVID-19  Secondary Diagnosis:	Urinary tract infection

## 2022-05-13 NOTE — ED PROVIDER NOTE - ATTENDING CONTRIBUTION TO CARE
Pt had infectious w/u that was remarkable for +UTI, +covid. exam is benign with nomralization of vitals after meds, no signs of respiratory compromise, hypoxia, or sepsis. No indication for inpatient admission at this point. Pt given 1 dose abx for uti. pt is well appearing and stable for d/c with oral abx for uti, supportive care for covid,  and isolation instructions.

## 2022-08-17 ENCOUNTER — APPOINTMENT (OUTPATIENT)
Dept: OPHTHALMOLOGY | Facility: CLINIC | Age: 77
End: 2022-08-17

## 2022-08-17 ENCOUNTER — NON-APPOINTMENT (OUTPATIENT)
Age: 77
End: 2022-08-17

## 2022-08-17 PROCEDURE — 92014 COMPRE OPH EXAM EST PT 1/>: CPT

## 2022-09-01 ENCOUNTER — APPOINTMENT (OUTPATIENT)
Dept: OPHTHALMOLOGY | Facility: CLINIC | Age: 77
End: 2022-09-01

## 2022-12-30 NOTE — ED CLERICAL - NS ED CLERK UNITS
From: Naina Hawkins  To: Jessenia Romero  Sent: 12/30/2022 5:59 AM CST  Subject: Naina Hawkins     I was wondering if I could have another medication for vb and or trichomonas I don't feel as of the first medicine prescribe to me worked. I took all of it as prescribed. But still itch and I haven't had any sexual contact.    APER

## 2023-01-13 ENCOUNTER — EMERGENCY (EMERGENCY)
Facility: HOSPITAL | Age: 78
LOS: 1 days | Discharge: HOME | End: 2023-01-13
Attending: STUDENT IN AN ORGANIZED HEALTH CARE EDUCATION/TRAINING PROGRAM
Payer: COMMERCIAL

## 2023-01-13 VITALS
OXYGEN SATURATION: 99 % | TEMPERATURE: 102 F | WEIGHT: 141.98 LBS | DIASTOLIC BLOOD PRESSURE: 66 MMHG | SYSTOLIC BLOOD PRESSURE: 132 MMHG | HEIGHT: 60 IN | HEART RATE: 95 BPM | RESPIRATION RATE: 18 BRPM

## 2023-01-13 LAB — GAS PNL BLDV: SIGNIFICANT CHANGE UP

## 2023-01-13 PROCEDURE — 99223 1ST HOSP IP/OBS HIGH 75: CPT

## 2023-01-13 PROCEDURE — 71045 X-RAY EXAM CHEST 1 VIEW: CPT | Mod: 26

## 2023-01-13 PROCEDURE — 70450 CT HEAD/BRAIN W/O DYE: CPT | Mod: 26,MA

## 2023-01-13 NOTE — ED PROVIDER NOTE - PROGRESS NOTE DETAILS
Arlene Webb- pt still has headache. explained results of pyelo. pt lives at home by herself, offered cdu, pt agrees

## 2023-01-13 NOTE — ED PROVIDER NOTE - ATTENDING CONTRIBUTION TO CARE
77 F w/ hx of COPD on wixela, kidney stone, HLD, HTN, prior hysterectomy in the past presents to the Er s/p bumping her head 3 days ago, pt didn't lose conciousness, no nausea no vomiting, no chills, no cough, no cp, no sob, no lightheadness no abd pain, pt tolerating sunchips today, pt w/ no neck pain, no sore throat, denies dysuria, no hematuria, ?body aches. no indwelling devices  On exam, pt is awake and alert oriented x3, in no distress, clear lungs soft abdomen no lower leg edema, pt w/ posterior phaynx w/ no exudate uvula midline, pt w/ fever, in the setting of head injury plan for labs imaging and reassessment possible ICH, however w/ normal head ct unlikely, pt not on any blood thinners, pt w/ soft abdomen, tolerated PO low suspicion for surgical abdomen, Plan to reassess possible dc home w/ outpt followup

## 2023-01-13 NOTE — ED PROVIDER NOTE - PHYSICAL EXAMINATION
Const: no acute distress, Well-developed, Eyes: no conjunctival injection and no scleral icterus ENMT: dyr mucus membranes, uvula midline no exudate,    CVS: +S1/S2, radial pulse 2+ bilaterally  RESP: Unlabored respiratory effort, Clear to auscultation bilaterally   GI: Nontender/Nondistended soft abdomen, no CVA tenderness   MSK: Extremities w/o deformity or ttp, no leg swelling   Psych: Awake, Alert, & Orientedx3;  Appropriate mood and affect, cooperative

## 2023-01-13 NOTE — ED PROVIDER NOTE - NS ED ROS FT
Noted.   Will address at follow up visit in August as reviewed with patient by Dr. Courtney's office.    Constitutional: no fevers no chills. _body aches  CV: no chest pain, no palpitations   Respiratory: no shortness of breath, no new cough   GI: no abdominal pain, no nausea no vomiting   Neuro: mild headache, no weakness, no numbness+body aches

## 2023-01-13 NOTE — ED PROVIDER NOTE - CLINICAL SUMMARY MEDICAL DECISION MAKING FREE TEXT BOX
head strike, head ct negative of ICH  pt w/ body aches and fever here in ERR, plan for labs and imaging cxr- initially told nursing she had cough, to me w/ no cough  pt w/ no features to suggest surgical abdomen, however rin elderly pt w/ feverr and body aches posisble virral sundrome,   given elderly woman w/ no obvious source of infection to have ct abdomen to assess for intraabdominal pathology, awaiting results of rvp and imaging at time of sign out, pt nontoxic appearing low suspicion for meningitis  case discussed w/ obs pa to  faciliate obserrvation for pt

## 2023-01-13 NOTE — ED PROVIDER NOTE - OBJECTIVE STATEMENT
77 F htn, copd, hld here w/ h/a s/p bumping head 3 days ago, pt was seen in rapid assessment head ct w/ no ICH, pt reporrts body aches, no n/v/d/cough/sob.

## 2023-01-13 NOTE — ED PROVIDER NOTE - RAPID ASSESSMENT
77F presents to ED c/o cough, fever and head injury.    Patient was rapidly assessed via a rapid medical evaluation and/or role of Quick Triage Doctor; a limited history, physical exam and assessment was performed. The patient will be seen and further evaluated in the main emergency department. The remainder of care and evaluation will be conducted by the primary emergency medicine team. Receiving team will follow up on labs, imaging and serially reassess patient as indicated. All further decisions regarding patient care, evaluation and disposition are at the discretion of the receiving primary emergency department team. Seen by Tylor Draper (MD) and Kelly Stone (Scribe). 77F presents to ED c/o cough, fever and head injury.    Patient was rapidly assessed via a rapid medical evaluation and/or role of Quick Triage Doctor; a limited history, physical exam and assessment was performed. The patient will be seen and further evaluated in the main emergency department. The remainder of care and evaluation will be conducted by the primary emergency medicine team. Receiving team will follow up on labs, imaging and serially reassess patient as indicated. All further decisions regarding patient care, evaluation and disposition are at the discretion of the receiving primary emergency department team. Seen by Tylor Draper (MD) and Kelly Stone (Scribe).    The scribe's documentation has been prepared under my direction and personally reviewed by me in its entirety. I confirm that the note above accurately reflects all work, treatment, procedures, and medical decision making performed by me. -KYE Draper-

## 2023-01-14 LAB
ALBUMIN SERPL ELPH-MCNC: 4 G/DL — SIGNIFICANT CHANGE UP (ref 3.3–5)
ALP SERPL-CCNC: 90 U/L — SIGNIFICANT CHANGE UP (ref 40–120)
ALT FLD-CCNC: 64 U/L — HIGH (ref 10–45)
ANION GAP SERPL CALC-SCNC: 13 MMOL/L — SIGNIFICANT CHANGE UP (ref 5–17)
APPEARANCE UR: CLEAR — SIGNIFICANT CHANGE UP
APTT BLD: 31.5 SEC — SIGNIFICANT CHANGE UP (ref 27.5–35.5)
AST SERPL-CCNC: 72 U/L — HIGH (ref 10–40)
BASOPHILS # BLD AUTO: 0.04 K/UL — SIGNIFICANT CHANGE UP (ref 0–0.2)
BASOPHILS NFR BLD AUTO: 0.3 % — SIGNIFICANT CHANGE UP (ref 0–2)
BILIRUB SERPL-MCNC: 1.5 MG/DL — HIGH (ref 0.2–1.2)
BILIRUB UR-MCNC: NEGATIVE — SIGNIFICANT CHANGE UP
BUN SERPL-MCNC: 27 MG/DL — HIGH (ref 7–23)
CALCIUM SERPL-MCNC: 9.1 MG/DL — SIGNIFICANT CHANGE UP (ref 8.4–10.5)
CHLORIDE SERPL-SCNC: 96 MMOL/L — SIGNIFICANT CHANGE UP (ref 96–108)
CO2 SERPL-SCNC: 24 MMOL/L — SIGNIFICANT CHANGE UP (ref 22–31)
COLOR SPEC: YELLOW — SIGNIFICANT CHANGE UP
CREAT SERPL-MCNC: 1.15 MG/DL — SIGNIFICANT CHANGE UP (ref 0.5–1.3)
DIFF PNL FLD: ABNORMAL
EGFR: 48 ML/MIN/1.73M2 — LOW
EOSINOPHIL # BLD AUTO: 0.01 K/UL — SIGNIFICANT CHANGE UP (ref 0–0.5)
EOSINOPHIL NFR BLD AUTO: 0.1 % — SIGNIFICANT CHANGE UP (ref 0–6)
FLUAV AG NPH QL: SIGNIFICANT CHANGE UP
FLUBV AG NPH QL: SIGNIFICANT CHANGE UP
GLUCOSE SERPL-MCNC: 168 MG/DL — HIGH (ref 70–99)
GLUCOSE UR QL: NEGATIVE — SIGNIFICANT CHANGE UP
HCT VFR BLD CALC: 37.2 % — SIGNIFICANT CHANGE UP (ref 34.5–45)
HGB BLD-MCNC: 12.4 G/DL — SIGNIFICANT CHANGE UP (ref 11.5–15.5)
IMM GRANULOCYTES NFR BLD AUTO: 0.4 % — SIGNIFICANT CHANGE UP (ref 0–0.9)
INR BLD: 1.14 RATIO — SIGNIFICANT CHANGE UP (ref 0.88–1.16)
KETONES UR-MCNC: NEGATIVE — SIGNIFICANT CHANGE UP
LEUKOCYTE ESTERASE UR-ACNC: ABNORMAL
LYMPHOCYTES # BLD AUTO: 1.54 K/UL — SIGNIFICANT CHANGE UP (ref 1–3.3)
LYMPHOCYTES # BLD AUTO: 12.3 % — LOW (ref 13–44)
MCHC RBC-ENTMCNC: 29.8 PG — SIGNIFICANT CHANGE UP (ref 27–34)
MCHC RBC-ENTMCNC: 33.3 GM/DL — SIGNIFICANT CHANGE UP (ref 32–36)
MCV RBC AUTO: 89.4 FL — SIGNIFICANT CHANGE UP (ref 80–100)
MONOCYTES # BLD AUTO: 1.2 K/UL — HIGH (ref 0–0.9)
MONOCYTES NFR BLD AUTO: 9.5 % — SIGNIFICANT CHANGE UP (ref 2–14)
NEUTROPHILS # BLD AUTO: 9.73 K/UL — HIGH (ref 1.8–7.4)
NEUTROPHILS NFR BLD AUTO: 77.4 % — HIGH (ref 43–77)
NITRITE UR-MCNC: NEGATIVE — SIGNIFICANT CHANGE UP
NRBC # BLD: 0 /100 WBCS — SIGNIFICANT CHANGE UP (ref 0–0)
PH UR: 5.5 — SIGNIFICANT CHANGE UP (ref 5–8)
PLATELET # BLD AUTO: 187 K/UL — SIGNIFICANT CHANGE UP (ref 150–400)
POTASSIUM SERPL-MCNC: 3.5 MMOL/L — SIGNIFICANT CHANGE UP (ref 3.5–5.3)
POTASSIUM SERPL-SCNC: 3.5 MMOL/L — SIGNIFICANT CHANGE UP (ref 3.5–5.3)
PROT SERPL-MCNC: 7.4 G/DL — SIGNIFICANT CHANGE UP (ref 6–8.3)
PROT UR-MCNC: ABNORMAL
PROTHROM AB SERPL-ACNC: 13.3 SEC — SIGNIFICANT CHANGE UP (ref 10.5–13.4)
RBC # BLD: 4.16 M/UL — SIGNIFICANT CHANGE UP (ref 3.8–5.2)
RBC # FLD: 11.9 % — SIGNIFICANT CHANGE UP (ref 10.3–14.5)
RSV RNA NPH QL NAA+NON-PROBE: SIGNIFICANT CHANGE UP
SARS-COV-2 RNA SPEC QL NAA+PROBE: SIGNIFICANT CHANGE UP
SODIUM SERPL-SCNC: 133 MMOL/L — LOW (ref 135–145)
SP GR SPEC: 1.02 — SIGNIFICANT CHANGE UP (ref 1.01–1.02)
UROBILINOGEN FLD QL: NEGATIVE — SIGNIFICANT CHANGE UP
WBC # BLD: 12.57 K/UL — HIGH (ref 3.8–10.5)
WBC # FLD AUTO: 12.57 K/UL — HIGH (ref 3.8–10.5)

## 2023-01-14 PROCEDURE — 99239 HOSP IP/OBS DSCHRG MGMT >30: CPT

## 2023-01-14 PROCEDURE — 74177 CT ABD & PELVIS W/CONTRAST: CPT | Mod: 26,MA

## 2023-01-14 PROCEDURE — 72125 CT NECK SPINE W/O DYE: CPT | Mod: 26,MA

## 2023-01-14 PROCEDURE — 70450 CT HEAD/BRAIN W/O DYE: CPT | Mod: 26,MA

## 2023-01-14 PROCEDURE — 76705 ECHO EXAM OF ABDOMEN: CPT | Mod: 26

## 2023-01-14 RX ORDER — PHENAZOPYRIDINE HCL 100 MG
100 TABLET ORAL ONCE
Refills: 0 | Status: DISCONTINUED | OUTPATIENT
Start: 2023-01-14 | End: 2023-01-15

## 2023-01-14 RX ORDER — METOCLOPRAMIDE HCL 10 MG
10 TABLET ORAL ONCE
Refills: 0 | Status: COMPLETED | OUTPATIENT
Start: 2023-01-14 | End: 2023-01-14

## 2023-01-14 RX ORDER — ACETAMINOPHEN 500 MG
1000 TABLET ORAL ONCE
Refills: 0 | Status: COMPLETED | OUTPATIENT
Start: 2023-01-14 | End: 2023-01-14

## 2023-01-14 RX ORDER — ACETAMINOPHEN 500 MG
650 TABLET ORAL EVERY 6 HOURS
Refills: 0 | Status: DISCONTINUED | OUTPATIENT
Start: 2023-01-14 | End: 2023-01-17

## 2023-01-14 RX ORDER — AZITHROMYCIN 500 MG/1
500 TABLET, FILM COATED ORAL ONCE
Refills: 0 | Status: COMPLETED | OUTPATIENT
Start: 2023-01-14 | End: 2023-01-14

## 2023-01-14 RX ORDER — SODIUM CHLORIDE 9 MG/ML
1000 INJECTION INTRAMUSCULAR; INTRAVENOUS; SUBCUTANEOUS ONCE
Refills: 0 | Status: COMPLETED | OUTPATIENT
Start: 2023-01-14 | End: 2023-01-14

## 2023-01-14 RX ORDER — CEFDINIR 250 MG/5ML
1 POWDER, FOR SUSPENSION ORAL
Qty: 28 | Refills: 0
Start: 2023-01-14 | End: 2023-01-27

## 2023-01-14 RX ORDER — CEFTRIAXONE 500 MG/1
1000 INJECTION, POWDER, FOR SOLUTION INTRAMUSCULAR; INTRAVENOUS ONCE
Refills: 0 | Status: COMPLETED | OUTPATIENT
Start: 2023-01-14 | End: 2023-01-14

## 2023-01-14 RX ORDER — CEFTRIAXONE 500 MG/1
1000 INJECTION, POWDER, FOR SOLUTION INTRAMUSCULAR; INTRAVENOUS EVERY 12 HOURS
Refills: 0 | Status: DISCONTINUED | OUTPATIENT
Start: 2023-01-14 | End: 2023-01-17

## 2023-01-14 RX ADMIN — SODIUM CHLORIDE 1000 MILLILITER(S): 9 INJECTION INTRAMUSCULAR; INTRAVENOUS; SUBCUTANEOUS at 12:14

## 2023-01-14 RX ADMIN — CEFTRIAXONE 100 MILLIGRAM(S): 500 INJECTION, POWDER, FOR SOLUTION INTRAMUSCULAR; INTRAVENOUS at 17:30

## 2023-01-14 RX ADMIN — SODIUM CHLORIDE 1000 MILLILITER(S): 9 INJECTION INTRAMUSCULAR; INTRAVENOUS; SUBCUTANEOUS at 03:29

## 2023-01-14 RX ADMIN — CEFTRIAXONE 100 MILLIGRAM(S): 500 INJECTION, POWDER, FOR SOLUTION INTRAMUSCULAR; INTRAVENOUS at 01:09

## 2023-01-14 RX ADMIN — Medication 400 MILLIGRAM(S): at 05:28

## 2023-01-14 RX ADMIN — AZITHROMYCIN 255 MILLIGRAM(S): 500 TABLET, FILM COATED ORAL at 01:53

## 2023-01-14 RX ADMIN — Medication 650 MILLIGRAM(S): at 17:29

## 2023-01-14 NOTE — ED ADULT NURSE NOTE - OBJECTIVE STATEMENT
77y Female AOx4 with PMH of HTN, COPD, HLD, s/p hysterectomy presents to the ED c/o headache. Pt states she bumping bumped her head x3 days ago, "I think I was asleep and walked to my chair then woke up, didn't know where I was and hit my head against the wall by accident". Remembers entire event, denies falling. Able to tolerate PO. Reports coming in today due to increased fever. Denies changes in vision, N/V, fever/chills, SOB, chest pain. Spontaneous/unlabored respirations, speaking in full sentences. Side rails up, bed in lowest position, oriented to call bell, safety maintained.
None

## 2023-01-14 NOTE — ED CDU PROVIDER INITIAL DAY NOTE - NS ED ATTENDING STATEMENT MOD
This was a shared visit with the BALA. I reviewed and verified the documentation and independently performed the documented:

## 2023-01-14 NOTE — ED CDU PROVIDER INITIAL DAY NOTE - NS ED ROS FT
Constitutional: +fever +chills  Eyes: No visual changes, eye pain or redness  HEENT: No throat pain, ear pain, nasal pain. No nose bleeding.  CV: No chest pain or lower extremity edema  Resp: No SOB no cough  GI: No abd pain. No nausea or vomiting. No diarrhea. No constipation.   : No dysuria, hematuria.   MSK: +musculoskeletal pain  Skin: No rash  Neuro: No headache. No numbness or tingling. No weakness.

## 2023-01-14 NOTE — ED CDU PROVIDER INITIAL DAY NOTE - ATTENDING APP SHARED VISIT CONTRIBUTION OF CARE
***Robert Cary MD***  I have personally performed a face to face diagnostic evaluation on this patient.  I have reviewed the ACP note and agree with the history, exam, and plan of care, except as noted. Patient will be reassessed and discussed with AM/PM CDU/FT MD who will follow up on labs, analgesia, imaging and disposition as clinically indicated. Please see emergency department provider note.

## 2023-01-14 NOTE — ED CDU PROVIDER INITIAL DAY NOTE - OBJECTIVE STATEMENT
77yof PMH of HTN, COPD, HLD, s/p hysterectomy presents to the ED c/o headache. Pt states she bumping bumped her head x3 days ago, "I think I was asleep and walked to my chair then woke up, didn't know where I was and hit my head against the wall by accident". Remembers entire event, denies falling. Able to tolerate PO. Reports coming in today due to fever. Denies changes in vision, N/V, SOB, chest pain.

## 2023-01-15 VITALS
OXYGEN SATURATION: 98 % | HEART RATE: 90 BPM | SYSTOLIC BLOOD PRESSURE: 124 MMHG | DIASTOLIC BLOOD PRESSURE: 69 MMHG | RESPIRATION RATE: 16 BRPM | TEMPERATURE: 98 F

## 2023-01-15 LAB
ANION GAP SERPL CALC-SCNC: 11 MMOL/L — SIGNIFICANT CHANGE UP (ref 5–17)
BASOPHILS # BLD AUTO: 0.04 K/UL — SIGNIFICANT CHANGE UP (ref 0–0.2)
BASOPHILS NFR BLD AUTO: 0.5 % — SIGNIFICANT CHANGE UP (ref 0–2)
BUN SERPL-MCNC: 15 MG/DL — SIGNIFICANT CHANGE UP (ref 7–23)
CALCIUM SERPL-MCNC: 8.5 MG/DL — SIGNIFICANT CHANGE UP (ref 8.4–10.5)
CHLORIDE SERPL-SCNC: 106 MMOL/L — SIGNIFICANT CHANGE UP (ref 96–108)
CO2 SERPL-SCNC: 21 MMOL/L — LOW (ref 22–31)
CREAT SERPL-MCNC: 0.85 MG/DL — SIGNIFICANT CHANGE UP (ref 0.5–1.3)
EGFR: 70 ML/MIN/1.73M2 — SIGNIFICANT CHANGE UP
EOSINOPHIL # BLD AUTO: 0.13 K/UL — SIGNIFICANT CHANGE UP (ref 0–0.5)
EOSINOPHIL NFR BLD AUTO: 1.6 % — SIGNIFICANT CHANGE UP (ref 0–6)
GLUCOSE SERPL-MCNC: 97 MG/DL — SIGNIFICANT CHANGE UP (ref 70–99)
HCT VFR BLD CALC: 31.6 % — LOW (ref 34.5–45)
HGB BLD-MCNC: 10.7 G/DL — LOW (ref 11.5–15.5)
IMM GRANULOCYTES NFR BLD AUTO: 0.4 % — SIGNIFICANT CHANGE UP (ref 0–0.9)
LYMPHOCYTES # BLD AUTO: 0.83 K/UL — LOW (ref 1–3.3)
LYMPHOCYTES # BLD AUTO: 10.5 % — LOW (ref 13–44)
MCHC RBC-ENTMCNC: 30 PG — SIGNIFICANT CHANGE UP (ref 27–34)
MCHC RBC-ENTMCNC: 33.9 GM/DL — SIGNIFICANT CHANGE UP (ref 32–36)
MCV RBC AUTO: 88.5 FL — SIGNIFICANT CHANGE UP (ref 80–100)
MONOCYTES # BLD AUTO: 0.79 K/UL — SIGNIFICANT CHANGE UP (ref 0–0.9)
MONOCYTES NFR BLD AUTO: 10 % — SIGNIFICANT CHANGE UP (ref 2–14)
NEUTROPHILS # BLD AUTO: 6.09 K/UL — SIGNIFICANT CHANGE UP (ref 1.8–7.4)
NEUTROPHILS NFR BLD AUTO: 77 % — SIGNIFICANT CHANGE UP (ref 43–77)
NRBC # BLD: 0 /100 WBCS — SIGNIFICANT CHANGE UP (ref 0–0)
PLATELET # BLD AUTO: 185 K/UL — SIGNIFICANT CHANGE UP (ref 150–400)
POTASSIUM SERPL-MCNC: 3.4 MMOL/L — LOW (ref 3.5–5.3)
POTASSIUM SERPL-SCNC: 3.4 MMOL/L — LOW (ref 3.5–5.3)
RBC # BLD: 3.57 M/UL — LOW (ref 3.8–5.2)
RBC # FLD: 11.9 % — SIGNIFICANT CHANGE UP (ref 10.3–14.5)
SODIUM SERPL-SCNC: 138 MMOL/L — SIGNIFICANT CHANGE UP (ref 135–145)
WBC # BLD: 7.91 K/UL — SIGNIFICANT CHANGE UP (ref 3.8–10.5)
WBC # FLD AUTO: 7.91 K/UL — SIGNIFICANT CHANGE UP (ref 3.8–10.5)

## 2023-01-15 PROCEDURE — 85018 HEMOGLOBIN: CPT

## 2023-01-15 PROCEDURE — 82565 ASSAY OF CREATININE: CPT

## 2023-01-15 PROCEDURE — 87637 SARSCOV2&INF A&B&RSV AMP PRB: CPT

## 2023-01-15 PROCEDURE — 71045 X-RAY EXAM CHEST 1 VIEW: CPT

## 2023-01-15 PROCEDURE — 80053 COMPREHEN METABOLIC PANEL: CPT

## 2023-01-15 PROCEDURE — 82435 ASSAY OF BLOOD CHLORIDE: CPT

## 2023-01-15 PROCEDURE — 85014 HEMATOCRIT: CPT

## 2023-01-15 PROCEDURE — 72125 CT NECK SPINE W/O DYE: CPT | Mod: MA

## 2023-01-15 PROCEDURE — 80048 BASIC METABOLIC PNL TOTAL CA: CPT

## 2023-01-15 PROCEDURE — 85610 PROTHROMBIN TIME: CPT

## 2023-01-15 PROCEDURE — G0378: CPT

## 2023-01-15 PROCEDURE — 85025 COMPLETE CBC W/AUTO DIFF WBC: CPT

## 2023-01-15 PROCEDURE — 81001 URINALYSIS AUTO W/SCOPE: CPT

## 2023-01-15 PROCEDURE — 74177 CT ABD & PELVIS W/CONTRAST: CPT | Mod: MA

## 2023-01-15 PROCEDURE — 83605 ASSAY OF LACTIC ACID: CPT

## 2023-01-15 PROCEDURE — 84132 ASSAY OF SERUM POTASSIUM: CPT

## 2023-01-15 PROCEDURE — 36415 COLL VENOUS BLD VENIPUNCTURE: CPT

## 2023-01-15 PROCEDURE — 99285 EMERGENCY DEPT VISIT HI MDM: CPT | Mod: 25

## 2023-01-15 PROCEDURE — 85730 THROMBOPLASTIN TIME PARTIAL: CPT

## 2023-01-15 PROCEDURE — 82803 BLOOD GASES ANY COMBINATION: CPT

## 2023-01-15 PROCEDURE — 87086 URINE CULTURE/COLONY COUNT: CPT

## 2023-01-15 PROCEDURE — 84295 ASSAY OF SERUM SODIUM: CPT

## 2023-01-15 PROCEDURE — 76705 ECHO EXAM OF ABDOMEN: CPT

## 2023-01-15 PROCEDURE — 96375 TX/PRO/DX INJ NEW DRUG ADDON: CPT

## 2023-01-15 PROCEDURE — 96374 THER/PROPH/DIAG INJ IV PUSH: CPT

## 2023-01-15 PROCEDURE — 82947 ASSAY GLUCOSE BLOOD QUANT: CPT

## 2023-01-15 PROCEDURE — 70450 CT HEAD/BRAIN W/O DYE: CPT | Mod: MA

## 2023-01-15 PROCEDURE — 87040 BLOOD CULTURE FOR BACTERIA: CPT

## 2023-01-15 PROCEDURE — 96376 TX/PRO/DX INJ SAME DRUG ADON: CPT

## 2023-01-15 PROCEDURE — 82330 ASSAY OF CALCIUM: CPT

## 2023-01-15 RX ORDER — AZITHROMYCIN 500 MG/1
1 TABLET, FILM COATED ORAL
Qty: 1 | Refills: 0
Start: 2023-01-15 | End: 2023-01-19

## 2023-01-15 RX ADMIN — CEFTRIAXONE 100 MILLIGRAM(S): 500 INJECTION, POWDER, FOR SOLUTION INTRAMUSCULAR; INTRAVENOUS at 05:23

## 2023-01-15 RX ADMIN — Medication 1000 MILLIGRAM(S): at 07:12

## 2023-01-15 RX ADMIN — Medication 650 MILLIGRAM(S): at 07:12

## 2023-01-15 NOTE — ED CDU PROVIDER SUBSEQUENT DAY NOTE - HISTORY
No interval changes since initial CDU provider note. Pt feels well without new complaint. NAD VSS. Plan to continue IV antibiotics in the setting of pyelonephritis. - JOHANNA Mohamud English

## 2023-01-15 NOTE — ED CDU PROVIDER SUBSEQUENT DAY NOTE - ATTENDING APP SHARED VISIT CONTRIBUTION OF CARE
I, Gloria Townsend, performed a history and physical exam of the patient and discussed their management with the resident and /or advanced care provider. I reviewed the resident and /or ACP's note and agree with the documented findings and plan of care. I was present and available for all procedures.   see MDM

## 2023-01-15 NOTE — ED ADULT NURSE REASSESSMENT NOTE - NS ED NURSE REASSESS COMMENT FT1
16.30 Received the Pt from  CATIE Mariee . Pt is Observed for Pyelonephritis. Received the Pt A&OX 4 obeys commands Rubia N/V/D fever chills cp SOB   Comfort care & safety measures continued  IV site looks clean & dry no signs of infiltration noted pt denies  pain IV site .  Pt is advised to call for help  call bell with in the reach pt verbalized the understanding .  pending CDU  MD israel . GCS 15/15 A&OX 4 PERRLA  size 3 Strong upper & lower extremities steady gait   No facial droop  No Hand Leg drop denies numbness tingling Continue to monitor
Report received from Asmita HADDAD in [facility]. Pt is resting comfortably. Awaiting dispo.
vss no complaints pending disposition
Pt received from CATIE Hall. Pt oriented to CDU & plan of care was discussed. Pt A&O x 4. Pt in CDU for IV abx, IVF, pain/fever control. Pt denies any chills, fever, abdominal pain, flank pain, urinary frequency or urgency, burning with urination as of now. Abdomen soft, nondistended, nontender. V/S stable, pt afebrile,  IV in place, patent and free of signs of infiltration. Pt resting in bed. Safety & comfort measures maintained. Call bell in reach. Will continue to monitor.

## 2023-01-15 NOTE — ED CDU PROVIDER DISPOSITION NOTE - NSFOLLOWUPINSTRUCTIONS_ED_ALL_ED_FT
Hydrate.     Please take Tylenol 650mg and/or Motrin 600mg every 6 hours as needed for pain.     We recommend you follow up with your primary care provider within the next 2-3 days, please bring all of your results with you.     Please return to the Emergency Department with new, worsening, or concerning symptoms, such as:  -Severe pain, uncontrolled vomiting  -Shortness of breath or trouble breathing  -Pressure, pain, tightness in chest  -Facial drooping, arm weakness, or speech difficulty   -Head injury or loss of consciousness     *More detailed information regarding your visit and discharge can be found by reviewing this packet 1. Please follow up with your Primary Care Doctor after discharge, bring a copy of your results to follow up appointment for review    2. Please rest, stay hydrated and continue all at home medications as previously prescribed    3. Please take antibiotics CEFDINIR and AZITHROMYCIN (prescriptions sent to your pharmacy) and complete both courses as written     4. For continued or recurrent pain recommend taking over the counter Tylenol (acetaminophen) 650 mg every 6 hours as needed     5. You will receive a call for any outstanding results or may call our ED Administration line at [phone] daily 11am-4pm to obtain results    6. Return to ED for any new or worsened symptoms

## 2023-01-15 NOTE — ED CDU PROVIDER DISPOSITION NOTE - WR ORDER NAME 1
Trice ambulatory encounter    WORKER'S COMPENSATION INITIAL EVALUATION    WORK INFORMATION:    EMPLOYER:  [employer]  DATE OF INJURY:  3/10/2020    CHIEF COMPLAINT:    Chief Complaint   Patient presents with   • Worker's Compensation     middle back / Lt shoulder         SUBJECTIVE:    Govind Boone is a 57 year old male, who presents with an initial complaint of an injury that reportedly occurred during work activities.  He works at [employer].       Mechanism of Injury:  He states that while he was at work on 3/4/20, he was walking at work and slipped on sawdust on the floor, causing him to fall backwards onto a custom pallet. He states that the pallet was partially secured to the floor but landing on it caused it to come loose and strike him in the back. He sustained injury to the left side of his upper back. He did not think he struck his head but then thought there might be a lump on it. He does not believe he lost consciousness. He also kicked his right calf with his left foot while coming down, but that pain has subsided. He has not had a headache. He continues with left shoulder discomfort. He has tried heat, ice, and icyhot. He does not like to take pills. He states the area feels tight and burned and there is tingling down the arm when he tries to use it to lift. .    He denies any other precipitating event or activity.  He has no history of previous problems with this part of the body.        REVIEW OF SYSTEMS:    A review of systems was performed and findings relevant to this injury are included in the History of Present Illness.     HISTORIES:    I have personally reviewed and updated the following EPIC sections:  Current Medications, Allergies and Problem List    OBJECTIVE:    PHYSICAL EXAM:    Visit Vitals  BP (!) 182/124   Pulse 66   Temp 97.4 °F (36.3 °C) (Temporal)   Resp 16   Ht 5' 10\" (1.778 m)   Wt 78.3 kg   SpO2 97%   BMI 24.75 kg/m²      General:  Well developed male who appears in no acute distress.    CV: RRR. Left radial pulse +2  Resp: CTA  MS: Equal upper extremity strength bilaterally. Full ROM of left arm except pain with reaching behind him. Tender just medial to left scapula. Nontender over spine. Palpation of rhomboid sends numbness/tingling down the left arm to the level of the elbow. No deformities. There is a scratch in the same region, believed to be from the wood pallet, not the nails.     DIAGNOSIS: Acute pain left shoulder  STATUS: This injury/condition is UNDETERMINED at this time with regard to causation.    RETURN TO WORK:   Mr. Boone is to return to work today WITH the restrictions indicated below.     RESTRICTIONS:   No use of injured upper extremity - keep immobilized..    TREATMENT PLAN:  Medications for this injury/condition:Non-steroidal anti-inflammatories. and physical therapy  Referral/Consult/Testing:Referral to out patient therapy  Instructions: Please start ibuprofen with food. Follow up with physical therapy and occupational medicine    FOLLOW-UP VISIT: Occupational medicine and physical therpay  Thank you for the privilege of providing medical care for this injury/condition.  If there are any questions, please call the clinic at Dept: [phone].      Electronically signed on 3/10/2020 at 6:18 PM by:   CÉSAR Rivera          Xray Chest 1 View AP/PA

## 2023-01-15 NOTE — ED CDU PROVIDER SUBSEQUENT DAY NOTE - PROGRESS NOTE DETAILS
CDU NOTE JOHANNA Hurt: VSS NAD. Patient is resting comfortably, reports she still feels mild fatigue and some R sided back pain. Being treated for R sided pyelo and possible PNA on CXR. Will PO challenge this am and reassess for dispo. Patient WBC count improved and clinically appears well Patient tolerated PO. Has been ambulating around CDU. Rx Cefdinir sent to pharmacy by primary ED team, additionally sent Rx Azithro to pharmacy for questioned PNA. Discussed with monisha Preston d/c home   Wendy Hutr PA-C

## 2023-01-15 NOTE — ED CDU PROVIDER DISPOSITION NOTE - CLINICAL COURSE
77yof PMH of HTN, COPD, HLD, s/p hysterectomy presents to the ED c/o headache. Pt states she bumping bumped her head x3 days ago, "I think I was asleep and walked to my chair then woke up, didn't know where I was and hit my head against the wall by accident". Remembers entire event, denies falling. Able to tolerate PO. Reports coming in today due to fever. Denies changes in vision, N/V, SOB, chest pain.  ED course: Febrile. CXR showed "Bibasilar patchy opacities may represent pneumonia." UA+. Given Azithromycin and Ceftriaxone. CT head/cspine negative for acute pathology and CT abdomen showed pyelonephritis. Plan to continue antibiotics and close observation in CDU. 77yof PMH of HTN, COPD, HLD, s/p hysterectomy presents to the ED c/o headache. Pt states she bumping bumped her head x3 days ago, "I think I was asleep and walked to my chair then woke up, didn't know where I was and hit my head against the wall by accident". Remembers entire event, denies falling. Able to tolerate PO. Reports coming in today due to fever. Denies changes in vision, N/V, SOB, chest pain.  ED course: Febrile. CXR showed "Bibasilar patchy opacities may represent pneumonia." UA+. Given Azithromycin and Ceftriaxone. CT head/cspine negative for acute pathology and CT abdomen showed pyelonephritis. Plan to continue antibiotics and close observation in CDU. While in CDU patient received IV abx with clinical improvement. WBC count improved as well. She tolerated PO and was reassessed in the morning . Case discussed with Dr. Napoles. Prescriptions sent to patients preferred pharmacy

## 2023-01-15 NOTE — ED CDU PROVIDER DISPOSITION NOTE - PATIENT PORTAL LINK FT
You can access the FollowMyHealth Patient Portal offered by [facility] by registering at the following website: [email or web address]. By joining [email or web address] [email or web address] portal, you will also be able to view your health information using other applications (apps) compatible with our system.

## 2023-01-15 NOTE — ED CDU PROVIDER SUBSEQUENT DAY NOTE - CLINICAL SUMMARY MEDICAL DECISION MAKING FREE TEXT BOX
Cary att-year-old female with history of hypertension, COPD, hyperlipidemia, status post hysterectomy who presented to the ED with complaints of headache.  Previously had excellently bumped her head against the wall, had had a headache following which has resolved however states that she has been feeling generalized fatigue since that time.  Also notes that she felt slightly feverish at home with mild cough over the past several days.  On exam she had been found to have right-sided tenderness.  Had a mild leukocytosis on initial labs in the ED UA with moderate leuk esterase and blood, and 20 WBCs.  CT abdomen pelvis with findings concerning for right pyelonephritis right upper quadrant negative for acute cholecystitis chest x-ray with bibasilar patchy opacities which may represent pneumonia and a negative head CT.  Patient was given azithromycin and ceftriaxone and placed in the CDU for IV antibiotics, pain control and frequent eval's.  This a.m. patient feeling well but still complaining of mild right abdominal pain.  Denies headache chest pain or shortness of breath.  No nausea or vomiting.  Tolerating p.o. without difficulty.  Well-appearing in no acute distress resting comfortably on stretcher.  Regular rate and rhythm, lungs clear to auscultation bilaterally speaking in full sentences without increased work of breathing.  Saturating well on room air.  Abdomen soft with mild R sided tenderenss, no rebound or guarding.  No CVA tenderness.  No lower extremity edema.  Discussed results with patient.  Will discharge home with antibiotics for pneumonia and pyelonephritis treatment, close follow-up with primary care doctor.

## 2023-01-15 NOTE — ED CDU PROVIDER DISPOSITION NOTE - ATTENDING APP SHARED VISIT CONTRIBUTION OF CARE
I, Gloria Townsend, performed a history and physical exam of the patient and discussed their management with the resident and /or advanced care provider. I reviewed the resident and /or ACP's note and agree with the documented findings and plan of care. I was present and available for all procedures.     Cary att-year-old female with history of hypertension, COPD, hyperlipidemia, status post hysterectomy who presented to the ED with complaints of headache.  Previously had excellently bumped her head against the wall, had had a headache following which has resolved however states that she has been feeling generalized fatigue since that time.  Also notes that she felt slightly feverish at home with mild cough over the past several days.  On exam she had been found to have right-sided tenderness.  Had a mild leukocytosis on initial labs in the ED UA with moderate leuk esterase and blood, and 20 WBCs.  CT abdomen pelvis with findings concerning for right pyelonephritis right upper quadrant negative for acute cholecystitis chest x-ray with bibasilar patchy opacities which may represent pneumonia and a negative head CT.  Patient was given azithromycin and ceftriaxone and placed in the CDU for IV antibiotics, pain control and frequent eval's.  This a.m. patient feeling well but still complaining of mild right abdominal pain.  Denies headache chest pain or shortness of breath.  No nausea or vomiting.  Tolerating p.o. without difficulty.  Well-appearing in no acute distress resting comfortably on stretcher.  Regular rate and rhythm, lungs clear to auscultation bilaterally speaking in full sentences without increased work of breathing.  Saturating well on room air.  Abdomen soft with mild R sided tenderenss, no rebound or guarding.  No CVA tenderness.  No lower extremity edema.  Discussed results with patient.  Will discharge home with antibiotics for pneumonia and pyelonephritis treatment, close follow-up with primary care doctor.

## 2023-01-15 NOTE — ED CDU PROVIDER SUBSEQUENT DAY NOTE - NS ED ROS FT
Constitutional: + fever  Eyes: No visual changes, no eye pain   CV: No chest pain or lower extremity edema  Resp: No SOB no cough  GI: No abd pain. No nausea or vomiting. No diarrhea.   : No dysuria, hematuria.   MSK: No musculoskeletal pain  Skin: No rash  Psych: No complaints   Neuro: + headache. No numbness or tingling.  Endo: No known diabetes

## 2023-01-16 LAB
CULTURE RESULTS: SIGNIFICANT CHANGE UP
SPECIMEN SOURCE: SIGNIFICANT CHANGE UP

## 2023-01-18 NOTE — PROGRESS NOTE ADULT - SUBJECTIVE AND OBJECTIVE BOX
febrile/ no abd   pain    REVIEW OF SYSTEMS:  GEN: no fever,    no chills  RESP: no SOB,   no cough  CVS: no chest pain,   no palpitations  GI: no abdominal pain,   no nausea,   no vomiting,   no constipation,   no diarrhea  : no dysuria,   no frequency  NEURO: no headache,   no dizziness  PSYCH: no depression,   not anxious  Derm : no rash    MEDICATIONS  (STANDING):  aspirin  chewable 81 milliGRAM(s) Oral daily  atorvastatin 20 milliGRAM(s) Oral at bedtime  budesonide 160 MICROgram(s)/formoterol 4.5 MICROgram(s) Inhaler 2 Puff(s) Inhalation two times a day  ertapenem  IVPB 1000 milliGRAM(s) IV Intermittent every 24 hours  ertapenem  IVPB      metoprolol succinate ER 50 milliGRAM(s) Oral daily  pantoprazole    Tablet 40 milliGRAM(s) Oral before breakfast  potassium chloride    Tablet ER 40 milliEquivalent(s) Oral daily    MEDICATIONS  (PRN):  acetaminophen   Tablet .. 650 milliGRAM(s) Oral every 6 hours PRN Temp greater or equal to 38C (100.4F), Moderate Pain (4 - 6)      Vital Signs Last 24 Hrs  T(C): 37.7 (05 Apr 2021 04:59), Max: 37.7 (05 Apr 2021 04:59)  T(F): 99.9 (05 Apr 2021 04:59), Max: 99.9 (05 Apr 2021 04:59)  HR: 80 (05 Apr 2021 04:59) (67 - 80)  BP: 149/65 (05 Apr 2021 04:59) (120/70 - 149/65)  BP(mean): --  RR: 18 (05 Apr 2021 04:59) (18 - 18)  SpO2: 93% (05 Apr 2021 04:59) (93% - 97%)  CAPILLARY BLOOD GLUCOSE        I&O's Summary    04 Apr 2021 07:01  -  05 Apr 2021 07:00  --------------------------------------------------------  IN: 480 mL / OUT: 0 mL / NET: 480 mL        PHYSICAL EXAM:  HEAD:  Atraumatic, Normocephalic  NECK: Supple, No   JVD  CHEST/LUNG:   no     rales,     no,    rhonchi  HEART: Regular rate and rhythm;         murmur  ABDOMEN: Soft, Nontender, ;   EXTREMITIES:  no      edema  NEUROLOGY:  alert    LABS:                                  Consultant(s) Notes Reviewed:      Care Discussed with Consultants/Other Providers:    
 afebrile. no abd pain    REVIEW OF SYSTEMS:  GEN: no fever,    no chills  RESP: no SOB,   no cough  CVS: no chest pain,   no palpitations  GI: no abdominal pain,   no nausea,   no vomiting,   no constipation,   no diarrhea  : no dysuria,   no frequency  NEURO: no headache,   no dizziness  PSYCH: no depression,   not anxious  Derm : no rash    MEDICATIONS  (STANDING):  aspirin  chewable 81 milliGRAM(s) Oral daily  atorvastatin 20 milliGRAM(s) Oral at bedtime  budesonide 160 MICROgram(s)/formoterol 4.5 MICROgram(s) Inhaler 2 Puff(s) Inhalation two times a day  cefTRIAXone   IVPB      cefTRIAXone   IVPB 1000 milliGRAM(s) IV Intermittent every 24 hours  heparin   Injectable 5000 Unit(s) SubCutaneous every 12 hours  metoprolol succinate ER 50 milliGRAM(s) Oral daily  pantoprazole    Tablet 40 milliGRAM(s) Oral before breakfast  potassium chloride    Tablet ER 40 milliEquivalent(s) Oral daily    MEDICATIONS  (PRN):  acetaminophen   Tablet .. 650 milliGRAM(s) Oral every 6 hours PRN Temp greater or equal to 38C (100.4F), Moderate Pain (4 - 6)      Vital Signs Last 24 Hrs  T(C): 36.9 (06 Apr 2021 04:58), Max: 36.9 (06 Apr 2021 04:58)  T(F): 98.4 (06 Apr 2021 04:58), Max: 98.4 (06 Apr 2021 04:58)  HR: 72 (06 Apr 2021 04:58) (61 - 73)  BP: 133/76 (06 Apr 2021 04:58) (111/69 - 153/71)  BP(mean): --  RR: 18 (06 Apr 2021 04:58) (18 - 18)  SpO2: 96% (06 Apr 2021 04:58) (96% - 97%)  CAPILLARY BLOOD GLUCOSE        I&O's Summary    05 Apr 2021 07:01  -  06 Apr 2021 07:00  --------------------------------------------------------  IN: 840 mL / OUT: 1 mL / NET: 839 mL        PHYSICAL EXAM:  HEAD:  Atraumatic, Normocephalic  NECK: Supple, No   JVD  CHEST/LUNG:   no     rales,     no,    rhonchi  HEART: Regular rate and rhythm;         murmur  ABDOMEN: Soft, Nontender, ;   EXTREMITIES:   no     edema  NEUROLOGY:  alert    LABS:                        12.2   10.82 )-----------( 405      ( 06 Apr 2021 06:07 )             36.4     04-06    135  |  101  |  18  ----------------------------<  94  4.6   |  21<L>  |  0.96    Ca    9.7      06 Apr 2021 06:07                              Consultant(s) Notes Reviewed:      Care Discussed with Consultants/Other Providers:    
infectious diseases progress note:    Patient is a 75y old  Female who presents with a chief complaint of uti (02 Apr 2021 09:31)        Tubulointerstitial nephritis             Allergies    Allergy Status Unknown    Intolerances    levofloxacin (Muscle Pain)      ANTIBIOTICS/RELEVANT:  antimicrobials  cefTRIAXone   IVPB 1000 milliGRAM(s) IV Intermittent every 24 hours    immunologic:    OTHER:  acetaminophen   Tablet .. 650 milliGRAM(s) Oral every 6 hours PRN  aspirin  chewable 81 milliGRAM(s) Oral daily  atorvastatin 20 milliGRAM(s) Oral at bedtime  budesonide 160 MICROgram(s)/formoterol 4.5 MICROgram(s) Inhaler 2 Puff(s) Inhalation two times a day  metoprolol succinate ER 50 milliGRAM(s) Oral daily  pantoprazole    Tablet 40 milliGRAM(s) Oral before breakfast  potassium chloride    Tablet ER 40 milliEquivalent(s) Oral daily  sodium chloride 0.9%. 1000 milliLiter(s) IV Continuous <Continuous>      Objective:  Vital Signs Last 24 Hrs  T(C): 37 (03 Apr 2021 06:35), Max: 38.7 (03 Apr 2021 04:55)  T(F): 98.6 (03 Apr 2021 06:35), Max: 101.6 (03 Apr 2021 04:55)  HR: 89 (03 Apr 2021 04:55) (72 - 89)  BP: 136/73 (03 Apr 2021 04:55) (104/61 - 136/73)  BP(mean): --  RR: 18 (03 Apr 2021 04:55) (18 - 18)     Eyes:SAPNA, EOMI  Ear/Nose/Throat: no oral lesion, no sinus tenderness on percussion	  Neck:no JVD, no lymphadenopathy, supple  Respiratory: CTA darlin  Cardiovascular: S1S2 RRR, no murmurs  Gastrointestinal:soft, (+) BS, no HSM  Extremities:no e/e/c        LABS:                        11.9   12.13 )-----------( 167      ( 03 Apr 2021 07:24 )             35.0     04-03    136  |  106  |  24<H>  ----------------------------<  83  4.6   |  18<L>  |  1.04    Ca    8.7      03 Apr 2021 07:24              MICROBIOLOGY:    RECENT CULTURES:  04-01 @ 14:55 .Blood Blood-Peripheral                No growth to date.    04-01 @ 00:21 .Urine Clean Catch (Midstream)                >100,000 CFU/ml Escherichia coli          RESPIRATORY CULTURES:              RADIOLOGY & ADDITIONAL STUDIES:        Pager [phone]  After 5 pm/weekends or if no response :[phone]
infectious diseases progress note:    Patient is a 75y old  Female who presents with a chief complaint of uti (03 Apr 2021 08:33)        Tubulointerstitial nephritis               Allergies    Allergy Status Unknown    Intolerances    levofloxacin (Muscle Pain)      ANTIBIOTICS/RELEVANT:  antimicrobials  ertapenem  IVPB 1000 milliGRAM(s) IV Intermittent every 24 hours  ertapenem  IVPB        immunologic:    OTHER:  acetaminophen   Tablet .. 650 milliGRAM(s) Oral every 6 hours PRN  aspirin  chewable 81 milliGRAM(s) Oral daily  atorvastatin 20 milliGRAM(s) Oral at bedtime  budesonide 160 MICROgram(s)/formoterol 4.5 MICROgram(s) Inhaler 2 Puff(s) Inhalation two times a day  metoprolol succinate ER 50 milliGRAM(s) Oral daily  pantoprazole    Tablet 40 milliGRAM(s) Oral before breakfast  potassium chloride    Tablet ER 40 milliEquivalent(s) Oral daily      Objective:  Vital Signs Last 24 Hrs  T(C): 37.7 (05 Apr 2021 04:59), Max: 37.7 (05 Apr 2021 04:59)  T(F): 99.9 (05 Apr 2021 04:59), Max: 99.9 (05 Apr 2021 04:59)  HR: 80 (05 Apr 2021 04:59) (67 - 80)  BP: 149/65 (05 Apr 2021 04:59) (120/70 - 149/65)  BP(mean): --  RR: 18 (05 Apr 2021 04:59) (18 - 18)  SpO2: 93% (05 Apr 2021 04:59) (93% - 97%)    PHYSICAL EXAM:  Constitutional:Well-developed, well nourished--no acute distress  Eyes:SAPNA, EOMI  Ear/Nose/Throat: no oral lesion, no sinus tenderness on percussion	  Neck:no JVD, no lymphadenopathy, supple  Respiratory: CTA darlin  Cardiovascular: S1S2 RRR, no murmurs  Gastrointestinal:soft, (+) BS, no HSM  Extremities:no e/e/c        LABS:                  MICROBIOLOGY:    RECENT CULTURES:  04-01 @ 14:55 .Blood Blood-Peripheral                No growth to date.    04-01 @ 00:21 .Urine Clean Catch (Midstream)   ROGELIO      Escherichia coli  Escherichia coli     >100,000 CFU/ml Escherichia coli          RESPIRATORY CULTURES:              RADIOLOGY & ADDITIONAL STUDIES:        Pager [phone]  After 5 pm/weekends or if no response :[phone]
infectious diseases progress note:    Patient is a 75y old  Female who presents with a chief complaint of Pyelonephritis (2021 13:19)        Tubulointerstitial nephritis             Allergies    Allergy Status Unknown    Intolerances    levofloxacin (Muscle Pain)      ANTIBIOTICS/RELEVANT:  antimicrobials  cefTRIAXone   IVPB 1000 milliGRAM(s) IV Intermittent every 24 hours    immunologic:    OTHER:  acetaminophen   Tablet .. 650 milliGRAM(s) Oral every 6 hours PRN  aspirin  chewable 81 milliGRAM(s) Oral daily  atorvastatin 20 milliGRAM(s) Oral at bedtime  budesonide 160 MICROgram(s)/formoterol 4.5 MICROgram(s) Inhaler 2 Puff(s) Inhalation two times a day  metoprolol succinate ER 50 milliGRAM(s) Oral daily  pantoprazole    Tablet 40 milliGRAM(s) Oral before breakfast  potassium chloride    Tablet ER 40 milliEquivalent(s) Oral daily  sodium chloride 0.9%. 1000 milliLiter(s) IV Continuous <Continuous>      Objective:  Vital Signs Last 24 Hrs  T(C): 36.9 (2021 08:18), Max: 39.3 (2021 10:12)  T(F): 98.4 (2021 08:18), Max: 102.8 (2021 10:12)  HR: 68 (2021 08:18) (52 - 104)  BP: 95/56 (2021 08:18) (95/56 - 153/66)  BP(mean): --  RR: 18 (2021 08:18) (18 - 18)  SpO2: 96% (2021 08:18) (92% - 97%)     Eyes:SAPNA, EOMI  Ear/Nose/Throat: no oral lesion, no sinus tenderness on percussion	  Neck:no JVD, no lymphadenopathy, supple  Respiratory: CTA darlin  Cardiovascular: S1S2 RRR, no murmurs  Gastrointestinal:soft, (+) BS, no HSM  Extremities:no e/e/c        LABS:                        11.6   11.26 )-----------( 114      ( 2021 06:20 )             34.0     04-02    136  |  106  |  33<H>  ----------------------------<  100<H>  4.2   |  21<L>  |  1.28    Ca    8.3<L>      2021 06:20  Phos  2.5       Mg     2.2         TPro  7.6  /  Alb  4.0  /  TBili  2.5<H>  /  DBili  x   /  AST  31  /  ALT  26  /  AlkPhos  99        Urinalysis Basic - ( 31 Mar 2021 21:49 )    Color: Yellow / Appearance: Slightly Turbid / S.014 / pH: x  Gluc: x / Ketone: Trace  / Bili: Negative / Urobili: Negative   Blood: x / Protein: 30 mg/dL / Nitrite: Negative   Leuk Esterase: Large / RBC: 21 /hpf / WBC 78 /HPF   Sq Epi: x / Non Sq Epi: 2 /hpf / Bacteria: Moderate          MICROBIOLOGY:    RECENT CULTURES:   @ 00:21 .Urine Clean Catch (Midstream)                >100,000 CFU/ml Escherichia coli          RESPIRATORY CULTURES:              RADIOLOGY & ADDITIONAL STUDIES:        Pager [phone]  After 5 pm/weekends or if no response :[phone]
[facility] [place] [facility]   MD VIDA RACHEL MD RUORU WONG, PA    TEL:  OFFICE: [phone]  DR HUERTA CELL: [phone]  CARLOS BOSTON CELL: [phone]  DR. MCHUGH CELL: [phone]  DR. TIERNEY CELL: [phone]    FROM 5 PM - 7 AM PLEASE CALL ANSWERING SERVICE: [phone]    RENAL FOLLOW UP NOTE--Date of Service 04-04-21 @ 08:40  --------------------------------------------------------------------------------  HPI:      Pt seen and examined at bedside.   Denies SOB, chest pain     PAST HISTORY  --------------------------------------------------------------------------------  No significant changes to PMH, PSH, FHx, SHx, unless otherwise noted    ALLERGIES & MEDICATIONS  --------------------------------------------------------------------------------  Allergies    Allergy Status Unknown    Intolerances    levofloxacin (Muscle Pain)    Standing Inpatient Medications  aspirin  chewable 81 milliGRAM(s) Oral daily  atorvastatin 20 milliGRAM(s) Oral at bedtime  budesonide 160 MICROgram(s)/formoterol 4.5 MICROgram(s) Inhaler 2 Puff(s) Inhalation two times a day  ertapenem  IVPB 1000 milliGRAM(s) IV Intermittent every 24 hours  ertapenem  IVPB      metoprolol succinate ER 50 milliGRAM(s) Oral daily  pantoprazole    Tablet 40 milliGRAM(s) Oral before breakfast  potassium chloride    Tablet ER 40 milliEquivalent(s) Oral daily  sodium chloride 0.9%. 1000 milliLiter(s) IV Continuous <Continuous>    PRN Inpatient Medications  acetaminophen   Tablet .. 650 milliGRAM(s) Oral every 6 hours PRN      REVIEW OF SYSTEMS  --------------------------------------------------------------------------------  General: no fever  CVS: no chest pain  RESP: no sob, no cough  ABD: no abdominal pain  : no dysuria,  MSK: no edema     VITALS/PHYSICAL EXAM  --------------------------------------------------------------------------------  T(C): 38.5 (04-04-21 @ 06:23), Max: 38.5 (04-03-21 @ 21:12)  HR: 78 (04-04-21 @ 06:31) (65 - 87)  BP: 152/78 (04-04-21 @ 06:31) (130/71 - 152/78)  RR: 16 (04-04-21 @ 06:31) (16 - 18)  SpO2: 95% (04-04-21 @ 06:23) (95% - 97%)  Wt(kg): --        04-03-21 @ 07:01  -  04-04-21 @ 07:00  --------------------------------------------------------  IN: 480 mL / OUT: 0 mL / NET: 480 mL      Physical Exam:  	Gen: NAD  	HEENT: MMM  	Pulm: CTA B/L  	CV: S1S2  	Abd: Soft, +BS  	Ext: No LE edema B/L                      Neuro: Awake   	Skin: Warm and Dry   	Vascular access: no HD catheter           no timothy  LABS/STUDIES  --------------------------------------------------------------------------------              11.9   12.13 >-----------<  167      [04-03-21 @ 07:24]              35.0     136  |  106  |  24  ----------------------------<  83      [04-03-21 @ 07:24]  4.6   |  18  |  1.04        Ca     8.7     [04-03-21 @ 07:24]            Creatinine Trend:  SCr 1.04 [04-03 @ 07:24]  SCr 1.28 [04-02 @ 06:20]  SCr 1.66 [04-01 @ 06:32]  SCr 2.09 [03-31 @ 16:57]    Urinalysis - [03-31-21 @ 21:49]      Color Yellow / Appearance Slightly Turbid / SG 1.014 / pH 6.0      Gluc Negative / Ketone Trace  / Bili Negative / Urobili Negative       Blood Moderate / Protein 30 mg/dL / Leuk Est Large / Nitrite Negative      RBC 21 / WBC 78 / Hyaline 2 / Gran  / Sq Epi  / Non Sq Epi 2 / Bacteria Moderate      Ferritin 1388      [01-10-21 @ 21:53]      
[facility] [place] [facility]ACT[facility]   MD Augustus Rees MD, D.O. Ruoru Wong, [place]    From 7 AM - 5 PM:  OFFICE: [phone]  Dr. Monson cell: [phone]  Dr. Villegas cell: [phone]  Dr. Frank cell: [phone]  JOHANNA Ricci cell: [phone]    From 5 PM - 7 AM: Answering Service: [phone]  Date of service: 04-05-21 @ 10:40    RENAL FOLLOW UP NOTE  --------------------------------------------------------------------------------  HPI:  Pt seen and examined at bedside.   Denies SOB, chest pain     PAST HISTORY  --------------------------------------------------------------------------------  No significant changes to PMH, PSH, FHx, SHx, unless otherwise noted    ALLERGIES & MEDICATIONS  --------------------------------------------------------------------------------  Allergies    Allergy Status Unknown    Intolerances    levofloxacin (Muscle Pain)    Standing Inpatient Medications  aspirin  chewable 81 milliGRAM(s) Oral daily  atorvastatin 20 milliGRAM(s) Oral at bedtime  budesonide 160 MICROgram(s)/formoterol 4.5 MICROgram(s) Inhaler 2 Puff(s) Inhalation two times a day  ertapenem  IVPB 1000 milliGRAM(s) IV Intermittent every 24 hours  ertapenem  IVPB      metoprolol succinate ER 50 milliGRAM(s) Oral daily  pantoprazole    Tablet 40 milliGRAM(s) Oral before breakfast  potassium chloride    Tablet ER 40 milliEquivalent(s) Oral daily    PRN Inpatient Medications  acetaminophen   Tablet .. 650 milliGRAM(s) Oral every 6 hours PRN      REVIEW OF SYSTEMS  --------------------------------------------------------------------------------  General: no fever  CVS: no chest pain  RESP: no sob, no cough  ABD: no abdominal pain  : no dysuria,  MSK: no edema     VITALS/PHYSICAL EXAM  --------------------------------------------------------------------------------  T(C): 37.7 (04-05-21 @ 04:59), Max: 37.7 (04-05-21 @ 04:59)  HR: 80 (04-05-21 @ 04:59) (67 - 80)  BP: 149/65 (04-05-21 @ 04:59) (120/70 - 149/65)  RR: 18 (04-05-21 @ 04:59) (18 - 18)  SpO2: 93% (04-05-21 @ 04:59) (93% - 97%)  Wt(kg): --        04-04-21 @ 07:01  -  04-05-21 @ 07:00  --------------------------------------------------------  IN: 480 mL / OUT: 0 mL / NET: 480 mL      Physical Exam:  	Gen: NAD  	HEENT: MMM  	Pulm: CTA B/L  	CV: S1S2  	Abd: Soft, +BS  	Ext: No LE edema B/L                      Neuro: Awake   	Skin: Warm and Dry   	    LABS/STUDIES  --------------------------------------------------------------------------------      Creatinine Trend:  SCr 1.04 [04-03 @ 07:24]  SCr 1.28 [04-02 @ 06:20]  SCr 1.66 [04-01 @ 06:32]  SCr 2.09 [03-31 @ 16:57]    Urinalysis - [03-31-21 @ 21:49]      Color Yellow / Appearance Slightly Turbid / SG 1.014 / pH 6.0      Gluc Negative / Ketone Trace  / Bili Negative / Urobili Negative       Blood Moderate / Protein 30 mg/dL / Leuk Est Large / Nitrite Negative      RBC 21 / WBC 78 / Hyaline 2 / Gran  / Sq Epi  / Non Sq Epi 2 / Bacteria Moderate      Ferritin 1388      [01-10-21 @ 21:53]      
[facility] [place] [facility]   MD Augustus Rees MD, D.O. Ruoru Wong, PA    From 7 AM - 5 PM:  OFFICE: [phone]  Dr. Monson cell: [phone]  Dr. Villegas cell: [phone]  Dr. Frank cell: [phone]  JOHANNA Ricci cell: [phone]    From 5 PM - 7 AM: Answering Service: [phone]  Date of service: 04-06-21 @ 11:07    RENAL FOLLOW UP NOTE  --------------------------------------------------------------------------------  HPI:  Pt seen and examined at bedside.   Denies SOB, chest pain     PAST HISTORY  --------------------------------------------------------------------------------  No significant changes to PMH, PSH, FHx, SHx, unless otherwise noted    ALLERGIES & MEDICATIONS  --------------------------------------------------------------------------------  Allergies    Allergy Status Unknown    Intolerances    levofloxacin (Muscle Pain)    Standing Inpatient Medications  aspirin  chewable 81 milliGRAM(s) Oral daily  atorvastatin 20 milliGRAM(s) Oral at bedtime  budesonide 160 MICROgram(s)/formoterol 4.5 MICROgram(s) Inhaler 2 Puff(s) Inhalation two times a day  cefTRIAXone   IVPB      cefTRIAXone   IVPB 1000 milliGRAM(s) IV Intermittent every 24 hours  heparin   Injectable 5000 Unit(s) SubCutaneous every 12 hours  metoprolol succinate ER 50 milliGRAM(s) Oral daily  pantoprazole    Tablet 40 milliGRAM(s) Oral before breakfast  potassium chloride    Tablet ER 40 milliEquivalent(s) Oral daily    PRN Inpatient Medications  acetaminophen   Tablet .. 650 milliGRAM(s) Oral every 6 hours PRN      REVIEW OF SYSTEMS  --------------------------------------------------------------------------------  General: no fever  CVS: no chest pain  RESP: no sob, no cough  ABD: no abdominal pain  : no dysuria,  MSK: no edema     VITALS/PHYSICAL EXAM  --------------------------------------------------------------------------------  T(C): 36.9 (04-06-21 @ 04:58), Max: 36.9 (04-06-21 @ 04:58)  HR: 72 (04-06-21 @ 04:58) (61 - 73)  BP: 133/76 (04-06-21 @ 04:58) (111/69 - 153/71)  RR: 18 (04-06-21 @ 04:58) (18 - 18)  SpO2: 96% (04-06-21 @ 04:58) (96% - 97%)  Wt(kg): --        04-05-21 @ 07:01  -  04-06-21 @ 07:00  --------------------------------------------------------  IN: 840 mL / OUT: 1 mL / NET: 839 mL      Physical Exam:  	Gen: NAD  	HEENT: MMM  	Pulm: CTA B/L  	CV: S1S2  	Abd: Soft, +BS  	Ext: No LE edema B/L                      Neuro: Awake   	Skin: Warm and Dry       LABS/STUDIES  --------------------------------------------------------------------------------              12.2   10.82 >-----------<  405      [04-06-21 @ 06:07]              36.4     135  |  101  |  18  ----------------------------<  94      [04-06-21 @ 06:07]  4.6   |  21  |  0.96        Ca     9.7     [04-06-21 @ 06:07]      Creatinine Trend:  SCr 0.96 [04-06 @ 06:07]  SCr 1.04 [04-03 @ 07:24]  SCr 1.28 [04-02 @ 06:20]  SCr 1.66 [04-01 @ 06:32]  SCr 2.09 [03-31 @ 16:57]    Urinalysis - [03-31-21 @ 21:49]      Color Yellow / Appearance Slightly Turbid / SG 1.014 / pH 6.0      Gluc Negative / Ketone Trace  / Bili Negative / Urobili Negative       Blood Moderate / Protein 30 mg/dL / Leuk Est Large / Nitrite Negative      RBC 21 / WBC 78 / Hyaline 2 / Gran  / Sq Epi  / Non Sq Epi 2 / Bacteria Moderate      Ferritin 1388      [01-10-21 @ 21:53]      
Patient is a 75y old  Female who presents with a chief complaint of Pyelonephritis (2021 13:19)      SUBJECTIVE / OVERNIGHT EVENTS: no events over night.       T(C): 36.5 (21 @ 12:09), Max: 37.2 (21 @ 06:58)  HR: 76 (21 @ 12:45) (68 - 76)  BP: 129/75 (21 @ 12:45) (95/56 - 129/75)  RR: 18 (21 @ 12:09) (18 - 18)  SpO2: 96% (21 @ 12:45) (96% - 96%)      MEDICATIONS  (STANDING):  aspirin  chewable 81 milliGRAM(s) Oral daily  atorvastatin 20 milliGRAM(s) Oral at bedtime  budesonide 160 MICROgram(s)/formoterol 4.5 MICROgram(s) Inhaler 2 Puff(s) Inhalation two times a day  cefTRIAXone   IVPB 1000 milliGRAM(s) IV Intermittent every 24 hours  metoprolol succinate ER 50 milliGRAM(s) Oral daily  pantoprazole    Tablet 40 milliGRAM(s) Oral before breakfast  potassium chloride    Tablet ER 40 milliEquivalent(s) Oral daily  sodium chloride 0.9%. 1000 milliLiter(s) (50 mL/Hr) IV Continuous <Continuous>    MEDICATIONS  (PRN):  acetaminophen   Tablet .. 650 milliGRAM(s) Oral every 6 hours PRN Moderate Pain (4 - 6)    CAPILLARY BLOOD GLUCOSE        PHYSICAL EXAM:  GENERAL: NAD, well-developed  HEAD:  Atraumatic, Normocephalic  EYES: EOMI, conjunctiva and sclera clear  NECK: Supple, No JVD  CHEST/LUNG: Clear to auscultation bilaterally; No wheeze  HEART: Regular rate and rhythm; No murmurs, rubs, or gallops  ABDOMEN: Soft, Nontender, Nondistended; Bowel sounds present  EXTREMITIES:  2+ Peripheral Pulses, No clubbing, cyanosis, or edema  PSYCH: AAOx3  NEUROLOGY: non-focal  SKIN: No rashes or lesions                          11.6   11.26 )-----------( 114      ( 2021 06:20 )             34.0       CARDIAC MARKERS ( 2021 00:17 )  x     / x     / 37 U/L / x     / 1.0 ng/mL          136|106|33<100  4.2|21|1.28  8.3,--,--   @ 06:20    CAPILLARY BLOOD GLUCOSE      Urinalysis Basic - ( 31 Mar 2021 21:49 )    Color: Yellow / Appearance: Slightly Turbid / S.014 / pH: x  Gluc: x / Ketone: Trace  / Bili: Negative / Urobili: Negative   Blood: x / Protein: 30 mg/dL / Nitrite: Negative   Leuk Esterase: Large / RBC: 21 /hpf / WBC 78 /HPF   Sq Epi: x / Non Sq Epi: 2 /hpf / Bacteria: Moderate        RADIOLOGY & ADDITIONAL TESTS:    Imaging Personally Reviewed:    Consultant(s) Notes Reviewed:      Care Discussed with Consultants/Other Providers:  
Patient is a 75y old  Female who presents with a chief complaint of Pyelonephritis (2021 13:19)      SUBJECTIVE / OVERNIGHT EVENTS: no events over night.       T(C): 36.9 (21 @ 20:23), Max: 37.1 (21 @ 13:26)  HR: 84 (21 @ 20:23) (84 - 84)  BP: 106/61 (21 @ 20:23) (106/61 - 112/65)  RR: 18 (21 @ 20:23) (18 - 18)  SpO2: 97% (21 @ 20:23) (95% - 97%)      MEDICATIONS  (STANDING):  atorvastatin 20 milliGRAM(s) Oral at bedtime  budesonide 160 MICROgram(s)/formoterol 4.5 MICROgram(s) Inhaler 2 Puff(s) Inhalation two times a day  cefTRIAXone   IVPB 1000 milliGRAM(s) IV Intermittent every 24 hours  metoprolol succinate ER 50 milliGRAM(s) Oral daily  pantoprazole    Tablet 40 milliGRAM(s) Oral before breakfast  potassium chloride    Tablet ER 40 milliEquivalent(s) Oral daily    MEDICATIONS  (PRN):  acetaminophen   Tablet .. 650 milliGRAM(s) Oral every 6 hours PRN Moderate Pain (4 - 6)      CAPILLARY BLOOD GLUCOSE        PHYSICAL EXAM:  GENERAL: NAD, well-developed  HEAD:  Atraumatic, Normocephalic  EYES: EOMI, conjunctiva and sclera clear  NECK: Supple, No JVD  CHEST/LUNG: Clear to auscultation bilaterally; No wheeze  HEART: Regular rate and rhythm; No murmurs, rubs, or gallops  ABDOMEN: Soft, Nontender, Nondistended; Bowel sounds present  EXTREMITIES:  2+ Peripheral Pulses, No clubbing, cyanosis, or edema  PSYCH: AAOx3  NEUROLOGY: non-focal  SKIN: No rashes or lesions    LABS:                        11.7   10.64 )-----------( 104      ( 2021 06:32 )             34.7     04-    129<L>  |  95<L>  |  38<H>  ----------------------------<  113<H>  3.3<L>   |  22  |  1.66<H>    Ca    8.4      2021 06:32  Phos  2.5     -  Mg     2.2         TPro  7.6  /  Alb  4.0  /  TBili  2.5<H>  /  DBili  x   /  AST  31  /  ALT  26  /  AlkPhos  99          CAPILLARY BLOOD GLUCOSE      Urinalysis Basic - ( 31 Mar 2021 21:49 )    Color: Yellow / Appearance: Slightly Turbid / S.014 / pH: x  Gluc: x / Ketone: Trace  / Bili: Negative / Urobili: Negative   Blood: x / Protein: 30 mg/dL / Nitrite: Negative   Leuk Esterase: Large / RBC: 21 /hpf / WBC 78 /HPF   Sq Epi: x / Non Sq Epi: 2 /hpf / Bacteria: Moderate        RADIOLOGY & ADDITIONAL TESTS:    Imaging Personally Reviewed:    Consultant(s) Notes Reviewed:      Care Discussed with Consultants/Other Providers:  
Patient is a 75y old  Female who presents with a chief complaint of Pyelonephritis (01 Apr 2021 13:19)      SUBJECTIVE / OVERNIGHT EVENTS: no events over night.     T(C): 36.9 (04-04-21 @ 22:00), Max: 36.9 (04-04-21 @ 22:00)  HR: 69 (04-04-21 @ 22:00) (67 - 69)  BP: 144/75 (04-04-21 @ 22:00) (120/70 - 144/75)  RR: 18 (04-04-21 @ 22:00) (18 - 18)  SpO2: 97% (04-04-21 @ 22:00) (97% - 97%)      MEDICATIONS  (STANDING):  aspirin  chewable 81 milliGRAM(s) Oral daily  atorvastatin 20 milliGRAM(s) Oral at bedtime  budesonide 160 MICROgram(s)/formoterol 4.5 MICROgram(s) Inhaler 2 Puff(s) Inhalation two times a day  ertapenem  IVPB 1000 milliGRAM(s) IV Intermittent every 24 hours  ertapenem  IVPB      metoprolol succinate ER 50 milliGRAM(s) Oral daily  pantoprazole    Tablet 40 milliGRAM(s) Oral before breakfast  potassium chloride    Tablet ER 40 milliEquivalent(s) Oral daily    MEDICATIONS  (PRN):  acetaminophen   Tablet .. 650 milliGRAM(s) Oral every 6 hours PRN Temp greater or equal to 38C (100.4F), Moderate Pain (4 - 6)      PHYSICAL EXAM:  GENERAL: NAD, well-developed  HEAD:  Atraumatic, Normocephalic  EYES: EOMI, conjunctiva and sclera clear  NECK: Supple, No JVD  CHEST/LUNG: Clear to auscultation bilaterally; No wheeze  HEART: Regular rate and rhythm; No murmurs, rubs, or gallops  ABDOMEN: Soft, Nontender, Nondistended; Bowel sounds present  EXTREMITIES:  2+ Peripheral Pulses, No clubbing, cyanosis, or edema  PSYCH: AAOx3  NEUROLOGY: non-focal  SKIN: No rashes or lesions                              11.9   12.13 )-----------( 167      ( 03 Apr 2021 07:24 )             35.0     Urine cx shows E.coli   Blood cx no growth to date   CAPILLARY BLOOD GLUCOSE              RADIOLOGY & ADDITIONAL TESTS:    Imaging Personally Reviewed:    Consultant(s) Notes Reviewed:      Care Discussed with Consultants/Other Providers:  
Patient is a 75y old  Female who presents with a chief complaint of Pyelonephritis (01 Apr 2021 13:19)      SUBJECTIVE / OVERNIGHT EVENTS: no events over night.     T(C): 38.5 (04-03-21 @ 21:12), Max: 38.5 (04-03-21 @ 21:12)  HR: 74 (04-03-21 @ 21:12) (73 - 74)  BP: 138/60 (04-03-21 @ 21:12) (130/75 - 138/60)  RR: 18 (04-03-21 @ 21:12) (18 - 18)  SpO2: 96% (04-03-21 @ 21:12) (96% - 96%)      MEDICATIONS  (STANDING):  aspirin  chewable 81 milliGRAM(s) Oral daily  atorvastatin 20 milliGRAM(s) Oral at bedtime  budesonide 160 MICROgram(s)/formoterol 4.5 MICROgram(s) Inhaler 2 Puff(s) Inhalation two times a day  ertapenem  IVPB      metoprolol succinate ER 50 milliGRAM(s) Oral daily  pantoprazole    Tablet 40 milliGRAM(s) Oral before breakfast  potassium chloride    Tablet ER 40 milliEquivalent(s) Oral daily  sodium chloride 0.9%. 1000 milliLiter(s) (50 mL/Hr) IV Continuous <Continuous>    MEDICATIONS  (PRN):  acetaminophen   Tablet .. 650 milliGRAM(s) Oral every 6 hours PRN Temp greater or equal to 38C (100.4F), Moderate Pain (4 - 6)      PHYSICAL EXAM:  GENERAL: NAD, well-developed  HEAD:  Atraumatic, Normocephalic  EYES: EOMI, conjunctiva and sclera clear  NECK: Supple, No JVD  CHEST/LUNG: Clear to auscultation bilaterally; No wheeze  HEART: Regular rate and rhythm; No murmurs, rubs, or gallops  ABDOMEN: Soft, Nontender, Nondistended; Bowel sounds present  EXTREMITIES:  2+ Peripheral Pulses, No clubbing, cyanosis, or edema  PSYCH: AAOx3  NEUROLOGY: non-focal  SKIN: No rashes or lesions                              11.9   12.13 )-----------( 167      ( 03 Apr 2021 07:24 )             35.0       CARDIAC MARKERS ( 02 Apr 2021 00:17 )  x     / x     / 37 U/L / x     / 1.0 ng/mL          136|106|24<83  4.6|18|1.04  8.7,--,--  04-03 @ 07:24      RADIOLOGY & ADDITIONAL TESTS:    Imaging Personally Reviewed:    Consultant(s) Notes Reviewed:      Care Discussed with Consultants/Other Providers:  
no

## 2023-01-19 ENCOUNTER — APPOINTMENT (OUTPATIENT)
Dept: AFTER HOURS CARE | Facility: EMERGENCY ROOM | Age: 78
End: 2023-01-19
Payer: MEDICARE

## 2023-01-20 DIAGNOSIS — M54.2 CERVICALGIA: ICD-10-CM

## 2023-01-20 PROCEDURE — 99441: CPT | Mod: 95

## 2023-01-20 NOTE — ASSESSMENT
[FreeTextEntry1] : while limited on the phone, more c/w MS[facility] origin, and pt st it felt like a typical stiff neck

## 2023-01-20 NOTE — PLAN
[No new medications perscribed] : Treat in place: No new medications prescribed [FreeTextEntry1] : reassurance\par ots analgesics, can take tylenol now and motrin if still uncomfortable.

## 2023-01-20 NOTE — HISTORY OF PRESENT ILLNESS
[Home] : at home, [unfilled] , at the time of the visit. [Other Location: e.g. Home (Enter Location, City,State)___] : at [unfilled] [Verbal consent obtained from patient] : the patient, [unfilled] [FreeTextEntry8] : PHONE CALL ONLY \par 77y F PMH of HTN, COPD, HLD, s/p hysterectomy, recently had CDU stay at [facility] ED for pyelo, still on abx now p/w\par neck pain. Pt woke up 16hrs ago with neck pain, hurts to move neck. no fever, chills, No relief with topical gel. Hasnt\par tried any pills. Went to PMD for f/u this AM and doctor was happy. No visual changes, falls, balance problems, HA,\par n/v/d.

## 2023-03-23 ENCOUNTER — APPOINTMENT (OUTPATIENT)
Dept: OPHTHALMOLOGY | Facility: CLINIC | Age: 78
End: 2023-03-23

## 2023-09-18 ENCOUNTER — EMERGENCY (EMERGENCY)
Facility: HOSPITAL | Age: 78
LOS: 1 days | Discharge: HOME | End: 2023-09-18
Attending: EMERGENCY MEDICINE
Payer: SELF-PAY

## 2023-09-18 VITALS
RESPIRATION RATE: 18 BRPM | SYSTOLIC BLOOD PRESSURE: 136 MMHG | TEMPERATURE: 98 F | DIASTOLIC BLOOD PRESSURE: 70 MMHG | HEART RATE: 71 BPM | OXYGEN SATURATION: 95 % | WEIGHT: 113.98 LBS

## 2023-09-18 PROCEDURE — 99283 EMERGENCY DEPT VISIT LOW MDM: CPT

## 2023-09-18 PROCEDURE — 99284 EMERGENCY DEPT VISIT MOD MDM: CPT

## 2023-09-18 RX ADMIN — Medication 1 TABLET(S): at 13:46

## 2023-09-18 NOTE — ED PROVIDER NOTE - PATIENT PORTAL LINK FT
You can access the FollowMyHealth Patient Portal offered by [facility] by registering at the following website: [email or web address]. By joining "[employer]"’s FollowMyHealth portal, you will also be able to view your health information using other applications (apps) compatible with our system.

## 2023-09-18 NOTE — ED PROVIDER NOTE - CLINICAL SUMMARY MEDICAL DECISION MAKING FREE TEXT BOX
RGUJRAL  78yo female presents status post left elbow dog bite.  Patient was visiting a patient when their dog started barking and bit her.  Dog is vaccinated.  Patient states her tetanus is up-to-date.  Left arm with superficial abrasion no active bleeding. Wound care to the area. Discussed with patient to monitor wound for infection, will start patient on Augmentin and follow-up discussed.

## 2023-09-18 NOTE — ED PROVIDER NOTE - OBJECTIVE STATEMENT
76 y/o female, presents to the ER after sustaining a dog bite to her left elbow. states her patients niece brought her dog over to her patients house and the dog bit her left elbow. states minimal bleeding noted to the area. denies f/n/v/d, CP, SOB, HA, dizziness, abdominal pain, urinary symptoms, leg pain/swelling, LOC. 76 y/o female, presents to the ER after sustaining a dog bite to her left elbow. states her patients niece brought her dog over to her patients house and the dog bit her left elbow. states minimal bleeding noted to the area. denies f/n/v/d, CP, SOB, HA, dizziness, abdominal pain, urinary symptoms, leg pain/swelling, LOC. spoke with owner of the dog together with patient who states that dog is up to date on vaccines. 76 y/o female, presents to the ER after sustaining a dog bite to her left elbow. states her patients niece brought her dog over to her patients house and the dog bit her left elbow. states minimal bleeding noted to the area. denies f/n/v/d, CP, SOB, HA, dizziness, abdominal pain, urinary symptoms, leg pain/swelling, LOC. spoke with owner of the dog together with patient who states that dog is up to date on vaccines. patient is up to date on her tetanus vaccine.

## 2023-09-18 NOTE — ED PROVIDER NOTE - NSFOLLOWUPINSTRUCTIONS_ED_ALL_ED_FT
1. It is important to follow up with your primary care doctor in 1-2 days    2. bring a copy of all your results to your follow up appointments    3.  medication from pharmacy and take as instructed     4. you can take Tylenol as needed for pain. you can take 650mg of Tylenol every 6 hours as needed for pain. do not take more than 4000mg in a 24 hour period.     5. if your symptoms worsen, persist, or if any new symptoms develop, or if you experience any signs of distress, return to the ER right away.

## 2023-09-18 NOTE — ED ADULT NURSE NOTE - OBJECTIVE STATEMENT
77 yr old female was bitten by a pts dog. l elbow small abrasion, finding out if dog vaccinated. on assessment a and o x 3 lungs clear abd soft non tender no swelling in extremities no n/v/d no fevers no other complaints.

## 2023-09-18 NOTE — ED ADULT NURSE NOTE - NSFALLUNIVINTERV_ED_ALL_ED
Bed/Stretcher in lowest position, wheels locked, appropriate side rails in place/Call bell, personal items and telephone in reach/Instruct patient to call for assistance before getting out of bed/chair/stretcher/Non-slip footwear applied when patient is off stretcher/[place] to call system/Physically safe environment - no spills, clutter or unnecessary equipment/Purposeful proactive rounding/Room/bathroom lighting operational, light cord in reach

## 2023-09-18 NOTE — ED ADULT TRIAGE NOTE - SOURCE OF INFORMATION
History & Physical       Patient Name: Cornell Barney Location: ASC   MRN: [id number]    : 1974    Date of Surgery: 10/11/2022      Physician: Nicole Linares MD                     HISTORY:  Pre-op Diagnosis: crc screening  Proposed Surgery: colonoscopy    HPI:  Cornell Barney is a 48 year old female    No past medical history on file.   Patient Active Problem List   Diagnosis   (none) - all problems resolved or deleted     Current Outpatient Medications   Medication Sig   • Sodium Sulfate-Mag Sulfate-KCl 9662-218-425 MG Tab Take 12 tablets by mouth as directed. See Colonoscopy Instructions.   • Multiple Vitamins-Minerals (MULTIVITAMIN PO)    • Ascorbic Acid (VITAMIN C PO)      No current facility-administered medications for this encounter.     ALLERGIES:  No Known Allergies     Social History:  Social History     Socioeconomic History   • Marital status: /Civil Union     Spouse name: Not on file   • Number of children: Not on file   • Years of education: Not on file   • Highest education level: Not on file   Occupational History   • Not on file   Tobacco Use   • Smoking status: Never Smoker   • Smokeless tobacco: Never Used   Substance and Sexual Activity   • Alcohol use: Not Currently   • Drug use: Never   • Sexual activity: Not on file   Other Topics Concern   • Not on file   Social History Narrative   • Not on file     Social Determinants of Health     Financial Resource Strain: Not on file   Food Insecurity: Not on file   Transportation Needs: Not on file   Physical Activity: Not on file   Stress: Not on file   Social Connections: Not on file   Intimate Partner Violence: Not on file       PERTINENT REVIEW OF SYSTEMS:   No contributory complaints    PHYSICAL EXAMINATION:  Vitals Signs Stable Yes        Mental Status: Awake & alert, O x 3 Yes   HEENT: No Gross lesion noted Yes    Pupils round & equal Yes    No icterus Yes   Heart: Regular rate & rhythm Yes   Lungs: Clear to auscultation  Yes   Abdomen: Soft and non-tender Yes   Extremities: No clubbing, cyanosis or edema Yes     Other:       Cornell Barney is cleared for surgery in the ambulatory setting.       Nicole Linares MD       EMS

## 2024-03-10 ENCOUNTER — EMERGENCY (EMERGENCY)
Facility: HOSPITAL | Age: 79
LOS: 1 days | Discharge: HOME | End: 2024-03-10
Attending: EMERGENCY MEDICINE
Payer: COMMERCIAL

## 2024-03-10 VITALS
RESPIRATION RATE: 20 BRPM | OXYGEN SATURATION: 96 % | HEART RATE: 70 BPM | SYSTOLIC BLOOD PRESSURE: 144 MMHG | DIASTOLIC BLOOD PRESSURE: 81 MMHG | HEIGHT: 64 IN | TEMPERATURE: 98 F | WEIGHT: 139.99 LBS

## 2024-03-10 VITALS
DIASTOLIC BLOOD PRESSURE: 60 MMHG | SYSTOLIC BLOOD PRESSURE: 130 MMHG | TEMPERATURE: 98 F | RESPIRATION RATE: 16 BRPM | HEART RATE: 60 BPM | OXYGEN SATURATION: 95 %

## 2024-03-10 PROCEDURE — 99284 EMERGENCY DEPT VISIT MOD MDM: CPT

## 2024-03-10 PROCEDURE — 99283 EMERGENCY DEPT VISIT LOW MDM: CPT

## 2024-03-10 RX ORDER — LIDOCAINE 4 G/100G
1 CREAM TOPICAL ONCE
Refills: 0 | Status: COMPLETED | OUTPATIENT
Start: 2024-03-10 | End: 2024-03-10

## 2024-03-10 RX ORDER — IBUPROFEN 200 MG
400 TABLET ORAL ONCE
Refills: 0 | Status: COMPLETED | OUTPATIENT
Start: 2024-03-10 | End: 2024-03-10

## 2024-03-10 RX ORDER — CYCLOBENZAPRINE HYDROCHLORIDE 10 MG/1
1 TABLET, FILM COATED ORAL
Qty: 14 | Refills: 0
Start: 2024-03-10 | End: 2024-03-16

## 2024-03-10 RX ORDER — ACETAMINOPHEN 500 MG
650 TABLET ORAL ONCE
Refills: 0 | Status: COMPLETED | OUTPATIENT
Start: 2024-03-10 | End: 2024-03-10

## 2024-03-10 RX ORDER — CYCLOBENZAPRINE HYDROCHLORIDE 10 MG/1
10 TABLET, FILM COATED ORAL ONCE
Refills: 0 | Status: COMPLETED | OUTPATIENT
Start: 2024-03-10 | End: 2024-03-10

## 2024-03-10 RX ADMIN — LIDOCAINE 1 PATCH: 4 CREAM TOPICAL at 09:59

## 2024-03-10 RX ADMIN — Medication 400 MILLIGRAM(S): at 09:59

## 2024-03-10 RX ADMIN — CYCLOBENZAPRINE HYDROCHLORIDE 10 MILLIGRAM(S): 10 TABLET, FILM COATED ORAL at 12:00

## 2024-03-10 RX ADMIN — Medication 650 MILLIGRAM(S): at 09:59

## 2024-03-10 NOTE — ED PROVIDER NOTE - PHYSICAL EXAMINATION
Gen: WNWD NAD  HEENT: NCAT PERRL EOMI normal pharynx  Neck: no midline TTP R sided upper trapezius and R SCM spasm   CV: RRR, no murmur  Lung: CTA BL  Abd: +BS soft NTND  Ext: wwp, palp pulses, FROMx4, no cce  Neuro: Awake alert CN grossly intact, sensation intact, motor 5/5 throughout Gen: WNWD uncomfortable appearing   HEENT: NCAT PERRL EOMI normal pharynx  Neck: no midline TTP R sided upper trapezius and L SCM spasm with neck turned to L side, painful rom   CV: RRR, no murmur  Lung: CTA BL  Abd: +BS soft NTND  Ext: wwp, palp pulses, FROMx4, no cce  Neuro: Awake alert CN grossly intact, sensation intact, motor 5/5 throughout

## 2024-03-10 NOTE — ED ADULT NURSE NOTE - NSFALLUNIVINTERV_ED_ALL_ED
Bed/Stretcher in lowest position, wheels locked, appropriate side rails in place/Call bell, personal items and telephone in reach/Instruct patient to call for assistance before getting out of bed/chair/stretcher/Non-slip footwear applied when patient is off stretcher/[place] to call system/Physically safe environment - no spills, clutter or unnecessary equipment/Purposeful proactive rounding/Room/bathroom lighting operational, light cord in reach

## 2024-03-10 NOTE — ED PROVIDER NOTE - NSFOLLOWUPINSTRUCTIONS_ED_ALL_ED_FT
You were seen in the Emergency Department for Neck pain.     1) Advance activity as tolerated.   2) New flexeril prescription sent to pharmacy indicated in interview take prescription as prescribed. Continue all previously prescribed medications as directed.  Take acetaminophen 975 mg every 8 hours and ibuprofen 400 mg every 8 hours.  3) Follow up with  your primary care physician in  3 to 5 days - take copies of your results.    4) Return to the Emergency Department for worsening or persistent symptoms, and/or ANY NEW OR CONCERNING SYMPTOMS.

## 2024-03-10 NOTE — ED PROVIDER NOTE - CLINICAL SUMMARY MEDICAL DECISION MAKING FREE TEXT BOX
78-year-old female history as above here with atraumatic neck pain and spasm.  No focal neurological deficits no chest pain or shortness of breath no fevers or sore throat no visual complaints.  On exam she is holding her neck turned to the left side appears stiff range of motion is limited secondary to pain she has palpable spasm to the neck muscles particularly the trapezius and sternocleidomastoids she has pain with palpation and ranging.  Low suspicion for infectious etiology significant disc herniation or spinal cord compression given normal neurological exam.  Do not suspect cardiac equivalent. Suspect musculoskeletal cause for her neck pain complaint.  Will treat with medications and reevaluate.

## 2024-03-10 NOTE — ED ADULT NURSE NOTE - OBJECTIVE STATEMENT
Pt came in c/o neck pain. Pt states she slept on the floor and then woke up with neck pain. No distress. Breathing easy and non labored. Pt is ambulatory. Pt is alert and orientedX4. Pt states she lives alone. Pt was in another hospital for the same problem and was admitted for 2 days. Pt is anxious. Emotional support offered.

## 2024-03-10 NOTE — ED PROVIDER NOTE - PROGRESS NOTE DETAILS
Patient’s prescription sent to their pharmacy indicated during interview. Improvement on symptoms. Passed PO challenge. Spoke to patient/family about results including incidental findings. Plan to discharge patient. Patient given PCP follow up and return precautions. Patient/family agrees with plan. Patient’s prescription sent to their pharmacy indicated during interview. Improvement in symptoms w improved pain and rom.  Plan to discharge patient. Patient given PCP follow up and return precautions. Patient agrees with plan.

## 2024-03-10 NOTE — ED PROVIDER NOTE - PATIENT PORTAL LINK FT
You can access the FollowMyHealth Patient Portal offered by [facility] by registering at the following website: [email or web address]. By joining [employer]’s FollowMyHealth portal, you will also be able to view your health information using other applications (apps) compatible with our system.

## 2024-03-10 NOTE — ED ADULT NURSE REASSESSMENT NOTE - NS ED NURSE REASSESS COMMENT FT1
Received report from CATIE Fowler. Safety checked. No c/o pain or discomfort at this time. Comfort care provided. Pt encouraged to call for assist when needed. pending taxi pickup

## 2024-03-10 NOTE — CHART NOTE - NSCHARTNOTEFT_GEN_A_CORE
ALESSANDRO notified by RN that patient is d/c and requires transportation home.  ALESSANDRO met with patient and verified home address and arranged Taxi for 4:00 P.M via [facility] [phone] confirmation # [id number].  Patient provide with info and RN notified of same.  ALESSANDRO will remain available to work in collaboration with the IDT.

## 2024-03-10 NOTE — ED PROVIDER NOTE - OBJECTIVE STATEMENT
78-year-old female history of hypertension hyperlipidemia presenting to the emergency room for evaluation of neck pain.  Neck pain has been present for the last 3 days.  States that she slept on a friend's floor Thursday evening and when she woke up on Friday she was having neck pain.  She was seen at [facility] emergency room on Friday for the same complaint.  She had labs and a CAT scan and was diagnosed with acute cystitis.  She was discharged with instructions to take Tylenol for her neck pain.  She reports that she continues to have pain in her neck especially on the right side.  Pain is worse with movement.  She reports some posterior head pain and headache as well.  There are no visual changes.  No nausea or vomiting.  No chest pain, shortness of breath, abdominal pain.  Denies any weakness, numbness, tingling.

## 2024-04-26 ENCOUNTER — EMERGENCY (EMERGENCY)
Facility: HOSPITAL | Age: 79
LOS: 1 days | Discharge: HOME | End: 2024-04-26
Attending: STUDENT IN AN ORGANIZED HEALTH CARE EDUCATION/TRAINING PROGRAM
Payer: MEDICARE

## 2024-04-26 VITALS
TEMPERATURE: 98 F | RESPIRATION RATE: 18 BRPM | OXYGEN SATURATION: 97 % | DIASTOLIC BLOOD PRESSURE: 84 MMHG | SYSTOLIC BLOOD PRESSURE: 135 MMHG | HEART RATE: 84 BPM

## 2024-04-26 VITALS
WEIGHT: 136.91 LBS | SYSTOLIC BLOOD PRESSURE: 146 MMHG | OXYGEN SATURATION: 96 % | RESPIRATION RATE: 18 BRPM | TEMPERATURE: 98 F | HEIGHT: 60 IN | HEART RATE: 89 BPM | DIASTOLIC BLOOD PRESSURE: 78 MMHG

## 2024-04-26 PROCEDURE — 99291 CRITICAL CARE FIRST HOUR: CPT | Mod: 25

## 2024-04-26 PROCEDURE — 99291 CRITICAL CARE FIRST HOUR: CPT

## 2024-04-26 PROCEDURE — 96374 THER/PROPH/DIAG INJ IV PUSH: CPT

## 2024-04-26 PROCEDURE — 96375 TX/PRO/DX INJ NEW DRUG ADDON: CPT

## 2024-04-26 RX ORDER — DIPHENHYDRAMINE HCL 50 MG
50 CAPSULE ORAL ONCE
Refills: 0 | Status: DISCONTINUED | OUTPATIENT
Start: 2024-04-26 | End: 2024-04-26

## 2024-04-26 RX ORDER — DIPHENHYDRAMINE HCL 50 MG
50 CAPSULE ORAL ONCE
Refills: 0 | Status: COMPLETED | OUTPATIENT
Start: 2024-04-26 | End: 2024-04-26

## 2024-04-26 RX ORDER — DEXAMETHASONE 0.5 MG/5ML
6 ELIXIR ORAL ONCE
Refills: 0 | Status: COMPLETED | OUTPATIENT
Start: 2024-04-26 | End: 2024-04-26

## 2024-04-26 RX ORDER — DEXAMETHASONE 0.5 MG/5ML
6 ELIXIR ORAL ONCE
Refills: 0 | Status: DISCONTINUED | OUTPATIENT
Start: 2024-04-26 | End: 2024-04-26

## 2024-04-26 RX ADMIN — Medication 6 MILLIGRAM(S): at 15:02

## 2024-04-26 RX ADMIN — Medication 50 MILLIGRAM(S): at 15:03

## 2024-04-26 NOTE — ED PROVIDER NOTE - CRITICAL CARE ATTENDING CONTRIBUTION TO CARE
78-year-old female with history of hypertension on lisinopril and metoprolol as well as hyperlipidemia presents to the emergency department with lower lip swelling.  Patient states that she woke up this morning and noticed that the lip was swollen and had progressive worsening of symptoms.  Patient with no fevers no chills no trouble breathing no throat pain.  Patient states that she then called the ambulance to bring her to the hospital.  Patient denies this ever occurring before.  Patient with no stridor nor trismus she has posterior pharynx with uvula midline there is no swelling in the posterior pharynx.  She is handling her oral secretions she has clear lungs to auscultation bilaterally she has the times the size of the upper lip compared to the bottom.  Patient to have monitoring to ensure that symptoms are improving concern for angioedema patient with no family history of this occurring in the past.  Patient counseled that she should stop taking lisinopril and will need to follow-up with her physician regarding other blood pressure medications

## 2024-04-26 NOTE — ED PROVIDER NOTE - OBJECTIVE STATEMENT
78-year-old female with history of hypertension hyperlipidemia presenting with sensation of facial swelling that began this morning the patient describes as mildly uncomfortable.  Patient denies a history of food allergies.  States that she ate some dumplings last night that was a new brand and she suspects that may be the cause.  On questioning patient states that she takes lisinopril for hypertension and has for many years.  Patient denies any shortness of breath rash cp itchiness.

## 2024-04-26 NOTE — ED PROVIDER NOTE - CLINICAL SUMMARY MEDICAL DECISION MAKING FREE TEXT BOX
78-year-old female with history of hypertension hyperlipidemia presenting with sensation of facial swelling that began this morning the patient describes as mildly uncomfortable. VS not actionable. Exam with swelling to upper lip and cheek, airway patent, no stridor no wheeze. suspect angioedema as pt is on lisinopril and has no hx of allergies. Will give decadron, benandryl, monitor for improvement and instruct to dc lisinopril and fu with pcp in absence of worsening

## 2024-04-26 NOTE — ED PROVIDER NOTE - NSFOLLOWUPINSTRUCTIONS_ED_ALL_ED_FT
stop lisinopril You need to stop taking your lisinopril as it is the likely cause of your swelling    Make an appointment to see your primary care physician so that they can adjust your blood pressure medications    Return to the ER for any worsening of swelling or trouble breathing     Angioedema    WHAT YOU NEED TO KNOW:    Angioedema is sudden swelling caused by fluid that collects in deep layers of the skin. Swelling occurs most often on the face, lips, tongue, or throat, but it can happen anywhere on the body. Your risk for angioedema increases if you have food or insect allergies or a family history of angioedema. Emotional stress, autoimmune disorders, and medicines, such as ACE inhibitors, also increase your risk. Your symptoms may be mild, or you may develop anaphylaxis. Anaphylaxis is a sudden, life-threatening reaction that needs immediate treatment.    DISCHARGE INSTRUCTIONS:    Call 911 for signs or symptoms of anaphylaxis, such as trouble breathing, swelling in your mouth or throat, or wheezing. You may also have itching, a rash, hives, or feel like you are going to faint.    Return to the emergency department if:    You have sudden behavior changes or irritability.    You are dizzy and your heart is beating faster than usual.  Contact your healthcare provider if:    Your swelling does not improve, even after you take your medicines.    You have questions or concerns about your condition or care. room air

## 2024-04-26 NOTE — ED PROVIDER NOTE - CARE PLAN
Goal:	angioedema  Assessment and plan of treatment:	angioedema   1 Principal Discharge DX:	Angioedema  Goal:	angioedema  Assessment and plan of treatment:	angioedema

## 2024-04-26 NOTE — ED PROVIDER NOTE - PHYSICAL EXAMINATION
PHYSICAL EXAM:  GEN: NAD   HEAD: edematous cheeks and upper lip   EYES: Clear bilaterally, pupils equal, round and reactive to light.  ENMT: Airway patent, Nasal mucosa clear. Mouth with normal mucosa. Uvula is midline.   CARDIAC: Normal rate, regular rhythm. +S1/S2. No murmurs, rubs or gallops.  RESPIRATORY: Breathing unlabored. Breath sounds clear and equal bilaterally.  SKIN: no rash

## 2024-04-26 NOTE — ED ADULT TRIAGE NOTE - TEMPERATURE IN FAHRENHEIT (DEGREES F)
97.7 Paramedian Forehead Flap Text: A decision was made to reconstruct the defect utilizing an interpolation axial flap and a staged reconstruction.  A telfa template was made of the defect.  This telfa template was then used to outline the paramedian forehead pedicle flap.  The donor area for the pedicle flap was then injected with anesthesia.  The flap was excised through the skin and subcutaneous tissue down to the layer of the underlying musculature.  The pedicle flap was carefully excised within this deep plane to maintain its blood supply.  The edges of the donor site were undermined.   The donor site was closed in a primary fashion.  The pedicle was then rotated into position and sutured.  Once the tube was sutured into place, adequate blood supply was confirmed with blanching and refill.  The pedicle was then wrapped with xeroform gauze and dressed appropriately with a telfa and gauze bandage to ensure continued blood supply and protect the attached pedicle.

## 2024-04-26 NOTE — ED PROVIDER NOTE - PATIENT PORTAL LINK FT
You can access the FollowMyHealth Patient Portal offered by [facility] by registering at the following website: [email or web address]. By joining [employer]’s FollowMyHealth portal, you will also be able to view your health information using other applications (apps) compatible with our system.

## 2024-04-26 NOTE — ED ADULT NURSE NOTE - OBJECTIVE STATEMENT
77 yo female complaining of facial swelling since this morning "I didn't eat or dink anything different, just some dumplings yesterday," Pt presented with angioedema, picture compared. Pt able to talk, no signs of acute respiratory distress. vitals WNL. Pt located on fast track

## 2024-07-13 ENCOUNTER — INPATIENT (INPATIENT)
Facility: HOSPITAL | Age: 79
LOS: 3 days | Discharge: HOME | End: 2024-07-17
Attending: INTERNAL MEDICINE | Admitting: INTERNAL MEDICINE
Payer: MEDICARE

## 2024-07-13 VITALS
SYSTOLIC BLOOD PRESSURE: 168 MMHG | TEMPERATURE: 99 F | WEIGHT: 134.48 LBS | RESPIRATION RATE: 16 BRPM | DIASTOLIC BLOOD PRESSURE: 89 MMHG | OXYGEN SATURATION: 98 % | HEART RATE: 88 BPM | HEIGHT: 60 IN

## 2024-07-13 LAB
ALBUMIN SERPL ELPH-MCNC: 3.6 G/DL — SIGNIFICANT CHANGE UP (ref 3.3–5)
ALP SERPL-CCNC: 98 U/L — SIGNIFICANT CHANGE UP (ref 40–120)
ALT FLD-CCNC: 18 U/L — SIGNIFICANT CHANGE UP (ref 4–33)
ANION GAP SERPL CALC-SCNC: 11 MMOL/L — SIGNIFICANT CHANGE UP (ref 7–14)
AST SERPL-CCNC: 24 U/L — SIGNIFICANT CHANGE UP (ref 4–32)
BASOPHILS # BLD AUTO: 0.04 K/UL — SIGNIFICANT CHANGE UP (ref 0–0.2)
BASOPHILS NFR BLD AUTO: 0.5 % — SIGNIFICANT CHANGE UP (ref 0–2)
BILIRUB SERPL-MCNC: 0.5 MG/DL — SIGNIFICANT CHANGE UP (ref 0.2–1.2)
BUN SERPL-MCNC: 19 MG/DL — SIGNIFICANT CHANGE UP (ref 7–23)
CALCIUM SERPL-MCNC: 9.3 MG/DL — SIGNIFICANT CHANGE UP (ref 8.4–10.5)
CHLORIDE SERPL-SCNC: 107 MMOL/L — SIGNIFICANT CHANGE UP (ref 98–107)
CO2 SERPL-SCNC: 23 MMOL/L — SIGNIFICANT CHANGE UP (ref 22–31)
CREAT SERPL-MCNC: 0.97 MG/DL — SIGNIFICANT CHANGE UP (ref 0.5–1.3)
EGFR: 59 ML/MIN/1.73M2 — LOW
EOSINOPHIL # BLD AUTO: 0.26 K/UL — SIGNIFICANT CHANGE UP (ref 0–0.5)
EOSINOPHIL NFR BLD AUTO: 3.1 % — SIGNIFICANT CHANGE UP (ref 0–6)
GLUCOSE SERPL-MCNC: 78 MG/DL — SIGNIFICANT CHANGE UP (ref 70–99)
HCT VFR BLD CALC: 37.5 % — SIGNIFICANT CHANGE UP (ref 34.5–45)
HGB BLD-MCNC: 13.1 G/DL — SIGNIFICANT CHANGE UP (ref 11.5–15.5)
IANC: 4.91 K/UL — SIGNIFICANT CHANGE UP (ref 1.8–7.4)
IMM GRANULOCYTES NFR BLD AUTO: 0.6 % — SIGNIFICANT CHANGE UP (ref 0–0.9)
LYMPHOCYTES # BLD AUTO: 2.37 K/UL — SIGNIFICANT CHANGE UP (ref 1–3.3)
LYMPHOCYTES # BLD AUTO: 28.1 % — SIGNIFICANT CHANGE UP (ref 13–44)
MCHC RBC-ENTMCNC: 30.7 PG — SIGNIFICANT CHANGE UP (ref 27–34)
MCHC RBC-ENTMCNC: 34.9 GM/DL — SIGNIFICANT CHANGE UP (ref 32–36)
MCV RBC AUTO: 87.8 FL — SIGNIFICANT CHANGE UP (ref 80–100)
MONOCYTES # BLD AUTO: 0.79 K/UL — SIGNIFICANT CHANGE UP (ref 0–0.9)
MONOCYTES NFR BLD AUTO: 9.4 % — SIGNIFICANT CHANGE UP (ref 2–14)
NEUTROPHILS # BLD AUTO: 4.91 K/UL — SIGNIFICANT CHANGE UP (ref 1.8–7.4)
NEUTROPHILS NFR BLD AUTO: 58.3 % — SIGNIFICANT CHANGE UP (ref 43–77)
NRBC # BLD: 0 /100 WBCS — SIGNIFICANT CHANGE UP (ref 0–0)
NRBC # FLD: 0 K/UL — SIGNIFICANT CHANGE UP (ref 0–0)
PLATELET # BLD AUTO: 242 K/UL — SIGNIFICANT CHANGE UP (ref 150–400)
POTASSIUM SERPL-MCNC: 3.8 MMOL/L — SIGNIFICANT CHANGE UP (ref 3.5–5.3)
POTASSIUM SERPL-SCNC: 3.8 MMOL/L — SIGNIFICANT CHANGE UP (ref 3.5–5.3)
PROT SERPL-MCNC: 6.6 G/DL — SIGNIFICANT CHANGE UP (ref 6–8.3)
RBC # BLD: 4.27 M/UL — SIGNIFICANT CHANGE UP (ref 3.8–5.2)
RBC # FLD: 12.9 % — SIGNIFICANT CHANGE UP (ref 10.3–14.5)
SODIUM SERPL-SCNC: 141 MMOL/L — SIGNIFICANT CHANGE UP (ref 135–145)
TROPONIN T, HIGH SENSITIVITY RESULT: 10 NG/L — SIGNIFICANT CHANGE UP
TROPONIN T, HIGH SENSITIVITY RESULT: 10 NG/L — SIGNIFICANT CHANGE UP
WBC # BLD: 8.42 K/UL — SIGNIFICANT CHANGE UP (ref 3.8–10.5)
WBC # FLD AUTO: 8.42 K/UL — SIGNIFICANT CHANGE UP (ref 3.8–10.5)

## 2024-07-13 PROCEDURE — 99223 1ST HOSP IP/OBS HIGH 75: CPT

## 2024-07-13 PROCEDURE — 71046 X-RAY EXAM CHEST 2 VIEWS: CPT | Mod: 26

## 2024-07-13 NOTE — ED PROVIDER NOTE - PROGRESS NOTE DETAILS
JOHANNA Dumont: pt signed out to Dr [place] follow up labs, cxr, CDU vs admission for acs. trops negative x2, some atelectasis seen on CXR. Otherwise, no more chest pain, ok with CDU admission. Plan for stress echo and tele.

## 2024-07-13 NOTE — ED ADULT NURSE NOTE - NSFALLUNIVINTERV_ED_ALL_ED
Bed/Stretcher in lowest position, wheels locked, appropriate side rails in place/Call bell, personal items and telephone in reach/Instruct patient to call for assistance before getting out of bed/chair/stretcher/Non-slip footwear applied when patient is off stretcher/[place] to call system/Physically safe environment - no spills, clutter or unnecessary equipment/Purposeful proactive rounding/Room/bathroom lighting operational, light cord in reach

## 2024-07-13 NOTE — ED ADULT TRIAGE NOTE - CHIEF COMPLAINT QUOTE
c/o CP for 2 hours. + weakness Denies strenuous activity when CP started. Non radiating. Patient was giving 324mg of ASA prior to arrival. Reports some relief of pain hx HTN HLD

## 2024-07-13 NOTE — ED PROVIDER NOTE - NSICDXPASTMEDICALHX_GEN_ALL_CORE_FT
PAST MEDICAL HISTORY:  Chronic obstructive pulmonary disease (COPD)     HLD (hyperlipidemia)     HTN (hypertension)     Kidney stone

## 2024-07-13 NOTE — ED CDU PROVIDER INITIAL DAY NOTE - ATTENDING APP SHARED VISIT CONTRIBUTION OF CARE
Attending MD Andrade.  Agree with above.  Pt is a 79 yo fem with pmhx of HTN, HLD who presents to ED with complaint of 1 day L sided CP.  Pt called doctor who called ambulance to bring pt to hospital.  Chronic bilateral LE cramping without change. EKG non-ischemic.  Pt without actionable findings in ED however 2/2 risk factors inc age pt amenable to CDU for ACS screening/monitoring and further testing in Am.  Stable for transfer of care to CDU/obs unit.

## 2024-07-13 NOTE — ED PROVIDER NOTE - CLINICAL SUMMARY MEDICAL DECISION MAKING FREE TEXT BOX
77 y/o F with PMH of HTN, HLD, and COPD presents to ED c/o CP x 1 day. Pt is well-appearing, NAD, vital signs stable, EKG WNL. Order CBC, CMP, troponin, CXR,  D-dimer, and reassess 77 y/o F with PMH of HTN, HLD, and COPD presents to ED c/o CP x 1 day. Pt is well-appearing, NAD, vital signs stable, EKG WNL. Order CBC, CMP, troponin, CXR,  D-dimer, and reassess    Attending MD Andrade.  Agree with above.  Pt is a 77 yo fem with pmhx of HTN, HLD who presents to ED with complaint of 1 day L sided CP.  Pt called doctor who called ambulance to bring pt to hospital.  Chronic bilateral LE cramping without change . EKG non-ischemic. 79 y/o Female with a PMHx of HTN, HLD, COPD presents to the ER c/o 1 day of chest pain.  Pt states chest pain started prior to arrival while making her lunch.  Pt states she called her PMD who called her an ambulance and told her to go to the hospital.  Pt is well appearing, NAD, EKG NSR, will obtain labs, CXR, cardiac monitor, reassess. Follow up PMD/Cardiology.     Attending MD Andrade.  Agree with above.  Pt is a 77 yo fem with pmhx of HTN, HLD who presents to ED with complaint of 1 day L sided CP.  Pt called doctor who called ambulance to bring pt to hospital.  Chronic bilateral LE cramping without change . EKG non-ischemic. 77 y/o Female with a PMHx of HTN, HLD, COPD presents to the ER c/o 1 day of chest pain.  Pt states chest pain started prior to arrival while making her lunch.  Pt states she called her PMD who called her an ambulance and told her to go to the hospital.  Pt is well appearing, NAD, EKG NSR, will obtain labs, CXR, cardiac monitor, reassess. Follow up PMD/Cardiology.     Attending MD Andrade.  Agree with above.  Pt is a 77 yo fem with pmhx of HTN, HLD who presents to ED with complaint of 1 day L sided CP.  Pt called doctor who called ambulance to bring pt to hospital.  Chronic bilateral LE cramping without change. EKG non-ischemic.

## 2024-07-13 NOTE — ED PROVIDER NOTE - ATTENDING APP SHARED VISIT CONTRIBUTION OF CARE
Attending MD Andrade:  I performed a history and physical exam of the patient and discussed their management with the PA. I reviewed the PA's note and agree with the documented findings and plan of care. My medical decision making and observations are found above.

## 2024-07-13 NOTE — ED PROVIDER NOTE - PHYSICAL EXAMINATION
General: Pt is well-appearing in NAD, Vital signs Stable   Cardiovascular: +Tender to palpation midsternal region.  Normal rate and rhythm. Normal S1 and S2   Respiratory: CTABL  MSK: +TTP b/l calves. No erythema, edema, or skin warmth noted. Constitutional: Well appearing, awake, alert, oriented to person, place, time/situation and in no apparent distress.  Cardiac: Normal rate, regular rhythm.  Heart sounds S1, S2.  No murmurs, rubs or gallops.  Respiratory: Breath sounds clear and equal bilaterally.  Gastroenterology: Abdomen soft, non-tender, no guarding.  Musculoskeletal: Spine appears normal, range of motion is not limited, no muscle or joint tenderness.  Neurological: Alert and oriented, no focal deficits, no motor or sensory deficits.  Skin: Skin normal color for race, warm, dry and intact. No evidence of rash.  Psychiatric: Alert and oriented to person, place, time/situation. normal mood and affect. no apparent risk to self or others.

## 2024-07-13 NOTE — ED ADULT NURSE NOTE - OBJECTIVE STATEMENT
pt. received to spot 26A A&Ox4 ambulatory p/w chest pain. pt. endorses x2 hours of atraumatic non-radiating aching L sided chest pain. pt. endorses receiving 324mg ASA prior to arrival with slight relief. NAD noted at this time. respirations even and unlabored on RA. 20g IV placed in the L AC. labs drawn and sent. comfort measures provided. safety precautions maintained.

## 2024-07-13 NOTE — ED CDU PROVIDER INITIAL DAY NOTE - NSSUBSTANCEUSE_GEN_ALL_CORE_SD
Abdomen , soft, nontender, nondistended , no guarding or rigidity , no masses palpable , normal bowel sounds , Liver and Spleen , no hepatomegaly present , no hepatosplenomegaly , liver nontender , spleen not palpable never used

## 2024-07-13 NOTE — ED PROVIDER NOTE - OBJECTIVE STATEMENT
79 y/o F with PMH of HTN, HLD, and COPD presents to ED c/o CP x 2 weeks worse within the last dy. Pt states the CP makes her cheest "feel heavy" and has been constant since last night worse in intensity about 2 hours ago when she was making lunch. Pt states she called her PCP who called her an ambulance and told her to go to the hospital. She admits to taking ASA in the ambulance which has helped with her pain. Pt also admits to b/l leg pain that comes and goes for the last few months and is worse in the morning. She denies noticing any swelling and states the pain gets better as the day goes on.  Pt denies any fevers, chills, palpitations, SOB, abdominal pain, n/v/d, HA, weakness, numbness, and tingling. 77 y/o Female with a PMHx of HTN, HLD, COPD presents to the ER c/o 1 day of chest pain.  Pt states chest pain started prior to arrival while making her lunch.  Pt states she called her PMD who called her an ambulance and told her to go to the hospital. Pt states she received Aspirin in the ambulance.  Pt denies  fevers, chills, palpitations, SOB, abdominal pain, n/v/d, HA, weakness, numbness, and tingling.

## 2024-07-13 NOTE — ED CDU PROVIDER INITIAL DAY NOTE - OBJECTIVE STATEMENT
79 y/o Female with a PMHx of HTN, HLD, COPD presents to the ER c/o 1 day of chest pain.  Pt states chest pain started prior to arrival while making her lunch.  Pt states she called her PMD who called her an ambulance and told her to go to the hospital. Pt states she received Aspirin in the ambulance.  Pt denies  fevers, chills, palpitations, SOB, abdominal pain, n/v/d, HA, weakness, numbness, and tingling.    CDU JOHANNA Roldan Note----  ED Provider HPI as above, reviewed.  Pt is an 77 yo female, PMH HTN, HLD, COPD, presenting to the ED after episode of chest pain that occurred midday on 7/13/24.  ROS otherwise NC.  In the ED, VSS, pt afebrile.  EKG NSR @ 85 bpm.  WBC 8.42, Hb 13.1; CMP unremarkable; troponin 10 ----> 10.  CXR was performed; no emergent findings noted (pending official radiology report). 77 y/o Female with a PMHx of HTN, HLD, COPD presents to the ER c/o 1 day of chest pain.  Pt states chest pain started prior to arrival while making her lunch.  Pt states she called her PMD who called her an ambulance and told her to go to the hospital. Pt states she received Aspirin in the ambulance.  Pt denies  fevers, chills, palpitations, SOB, abdominal pain, n/v/d, HA, weakness, numbness, and tingling.    CDU OJHANNA Roldan Note----  ED Provider HPI as above, reviewed.  Pt is an 77 yo female, PMH HTN, HLD, COPD, presenting to the ED after episode of chest pain that occurred midday on 7/13/24.  ROS otherwise NC.  In the ED, VSS, pt afebrile.  EKG NSR @ 85 bpm.  WBC 8.42, Hb 13.1; CMP unremarkable; troponin 10 ----> 10.  CXR was performed; no emergent findings noted (official radiology report is pending at this time).  Pt was sent to CDU for continued care plan:  Tele monitoring, stress test, echo, Tele Doc of Day evaluation, general observation care / monitoring.

## 2024-07-14 LAB
A1C WITH ESTIMATED AVERAGE GLUCOSE RESULT: 5.8 % — HIGH (ref 4–5.6)
ANION GAP SERPL CALC-SCNC: 16 MMOL/L — HIGH (ref 7–14)
BASOPHILS # BLD AUTO: 0.04 K/UL — SIGNIFICANT CHANGE UP (ref 0–0.2)
BASOPHILS NFR BLD AUTO: 0.5 % — SIGNIFICANT CHANGE UP (ref 0–2)
BUN SERPL-MCNC: 16 MG/DL — SIGNIFICANT CHANGE UP (ref 7–23)
CALCIUM SERPL-MCNC: 9.4 MG/DL — SIGNIFICANT CHANGE UP (ref 8.4–10.5)
CHLORIDE SERPL-SCNC: 102 MMOL/L — SIGNIFICANT CHANGE UP (ref 98–107)
CHOLEST SERPL-MCNC: 166 MG/DL — SIGNIFICANT CHANGE UP
CO2 SERPL-SCNC: 23 MMOL/L — SIGNIFICANT CHANGE UP (ref 22–31)
CREAT SERPL-MCNC: 0.86 MG/DL — SIGNIFICANT CHANGE UP (ref 0.5–1.3)
EGFR: 68 ML/MIN/1.73M2 — SIGNIFICANT CHANGE UP
EOSINOPHIL # BLD AUTO: 0.33 K/UL — SIGNIFICANT CHANGE UP (ref 0–0.5)
EOSINOPHIL NFR BLD AUTO: 4.4 % — SIGNIFICANT CHANGE UP (ref 0–6)
ESTIMATED AVERAGE GLUCOSE: 120 — SIGNIFICANT CHANGE UP
GLUCOSE SERPL-MCNC: 86 MG/DL — SIGNIFICANT CHANGE UP (ref 70–99)
HCT VFR BLD CALC: 40.1 % — SIGNIFICANT CHANGE UP (ref 34.5–45)
HDLC SERPL-MCNC: 70 MG/DL — SIGNIFICANT CHANGE UP
HGB BLD-MCNC: 14 G/DL — SIGNIFICANT CHANGE UP (ref 11.5–15.5)
IANC: 4.77 K/UL — SIGNIFICANT CHANGE UP (ref 1.8–7.4)
IMM GRANULOCYTES NFR BLD AUTO: 0.5 % — SIGNIFICANT CHANGE UP (ref 0–0.9)
LIPID PNL WITH DIRECT LDL SERPL: 78 MG/DL — SIGNIFICANT CHANGE UP
LYMPHOCYTES # BLD AUTO: 1.49 K/UL — SIGNIFICANT CHANGE UP (ref 1–3.3)
LYMPHOCYTES # BLD AUTO: 20.1 % — SIGNIFICANT CHANGE UP (ref 13–44)
MAGNESIUM SERPL-MCNC: 2.2 MG/DL — SIGNIFICANT CHANGE UP (ref 1.6–2.6)
MCHC RBC-ENTMCNC: 30.4 PG — SIGNIFICANT CHANGE UP (ref 27–34)
MCHC RBC-ENTMCNC: 34.9 GM/DL — SIGNIFICANT CHANGE UP (ref 32–36)
MCV RBC AUTO: 87.2 FL — SIGNIFICANT CHANGE UP (ref 80–100)
MONOCYTES # BLD AUTO: 0.76 K/UL — SIGNIFICANT CHANGE UP (ref 0–0.9)
MONOCYTES NFR BLD AUTO: 10.2 % — SIGNIFICANT CHANGE UP (ref 2–14)
NEUTROPHILS # BLD AUTO: 4.77 K/UL — SIGNIFICANT CHANGE UP (ref 1.8–7.4)
NEUTROPHILS NFR BLD AUTO: 64.3 % — SIGNIFICANT CHANGE UP (ref 43–77)
NON HDL CHOLESTEROL: 96 MG/DL — SIGNIFICANT CHANGE UP
NRBC # BLD: 0 /100 WBCS — SIGNIFICANT CHANGE UP (ref 0–0)
NRBC # FLD: 0 K/UL — SIGNIFICANT CHANGE UP (ref 0–0)
PHOSPHATE SERPL-MCNC: 3.3 MG/DL — SIGNIFICANT CHANGE UP (ref 2.5–4.5)
PLATELET # BLD AUTO: 269 K/UL — SIGNIFICANT CHANGE UP (ref 150–400)
POTASSIUM SERPL-MCNC: 3.8 MMOL/L — SIGNIFICANT CHANGE UP (ref 3.5–5.3)
POTASSIUM SERPL-SCNC: 3.8 MMOL/L — SIGNIFICANT CHANGE UP (ref 3.5–5.3)
RBC # BLD: 4.6 M/UL — SIGNIFICANT CHANGE UP (ref 3.8–5.2)
RBC # FLD: 12.7 % — SIGNIFICANT CHANGE UP (ref 10.3–14.5)
SODIUM SERPL-SCNC: 141 MMOL/L — SIGNIFICANT CHANGE UP (ref 135–145)
TRIGL SERPL-MCNC: 89 MG/DL — SIGNIFICANT CHANGE UP
WBC # BLD: 7.43 K/UL — SIGNIFICANT CHANGE UP (ref 3.8–10.5)
WBC # FLD AUTO: 7.43 K/UL — SIGNIFICANT CHANGE UP (ref 3.8–10.5)

## 2024-07-14 PROCEDURE — 99233 SBSQ HOSP IP/OBS HIGH 50: CPT

## 2024-07-14 RX ORDER — ASPIRIN 325 MG/1
81 TABLET, FILM COATED ORAL DAILY
Refills: 0 | Status: DISCONTINUED | OUTPATIENT
Start: 2024-07-14 | End: 2024-07-17

## 2024-07-14 RX ADMIN — ASPIRIN 81 MILLIGRAM(S): 325 TABLET, FILM COATED ORAL at 12:29

## 2024-07-14 NOTE — ED CDU PROVIDER SUBSEQUENT DAY NOTE - PROGRESS NOTE DETAILS
JOHANNA Dumont: pt resting comfortably, NAD, pt seen and evaluated by cardiology Dr Guzman.  Pt pending stress test.  Pt unable to obtain stress test today as pt had coffee will obtain in am.  Pt aware and agreeable with plan.

## 2024-07-14 NOTE — ED CDU PROVIDER SUBSEQUENT DAY NOTE - NS ED MD PROGRESS NOTE PROVIDER NAME FT
JOHANNA Dumont 6 year old male with PMh of Factor V Leiden on Coumadin presents with abdominal pain and three episodes of red emesis.  Diet that day consisted of steak, chicken parmigiana and red velvet cake.  no fever.   no sick contacts, although was recently discharged from hospital due to fall and observation secondary to concussion.  Currently has no complaints of abdominal pain, headache or nausea, feels hungry.  Based on pictures of emesis, unlikely its blood.    Gen:  well appearing, in no distress  HEENT: NCAT, moist mucus membranes, conjunctiva clear  CVS: s1s2 no murmurs  lungs: CTA  Abd: soft, Non-distended, non-tender to soft and deep palpation.  no rebound or guarding.    ext: normal cap refill, right leg amputated below the knee.   Neuro:  CNII-XII intact, AAOx3    Labs as above    A/P:  6 year old male with PMh of Factor V Leiden on Coumadin presents with abdominal pain and three episodes of red emesis, most likely gastritis, currently asymptomatic over the past 6 hours.    hold coumadin for now  repeat CBC and coags this afternoon  repeat stool guaiac, if negative advance diet, start with clears  monitor patient today for pain  Maybe be discharged home today if CBC is normal, and patient is asymptomatic  unlikley patient is bleeding with INR less than therapeutic at 1.5 (goal is 2-3)  Will restart coumadin tonight if CBC is stable  Follow up with Hem/Onc

## 2024-07-14 NOTE — CONSULT NOTE ADULT - SUBJECTIVE AND OBJECTIVE BOX
CARDIOLOGY CONSULT NOTE - DR. VICK        Date of Service: 07-14-24 @ 11:33      HPI:      79 yo female, PMH HTN, HLD, COPD, presenting to the ED after episode of chest pain that occurred midday on 7/13/24.  ROS otherwise NC.  In the ED, VSS, pt afebrile.  EKG NSR @ 85 bpm.  WBC 8.42, Hb 13.1; CMP unremarkable; troponin 10 ----> 10.  CXR was performed; no emergent findings noted (official radiology report is pending at this time).  Pt was sent to CDU for continued care plan:  Tele monitoring, stress test, echo, Tele Doc of Day evaluation, general observation care / monitoring.    denies active chest pain, dyspnea, back pain, cough    PAST MEDICAL & SURGICAL HISTORY:  HTN (hypertension)      HLD (hyperlipidemia)      Kidney stone      Chronic obstructive pulmonary disease (COPD)      H/O: hysterectomy            PREVIOUS DIAGNOSTIC TESTING:    [ ] Echocardiogram:  [ ]  Catheterization:  [ ] Stress Test:  	    MEDICATIONS:    Home Medications:  Advair Diskus 250 mcg-50 mcg inhalation powder: 1 puff(s) inhaled 2 times a day (10 Trevin 2021 18:22)  atorvastatin 20 mg oral tablet: 1 tab(s) orally once a day (at bedtime) (10 Trevin 2021 18:22)  metoprolol succinate 50 mg oral tablet, extended release: 1 tab(s) orally once a day (10 Trevin 2021 18:22)  omeprazole 40 mg oral delayed release capsule: 1 cap(s) orally once a day (10 Trevin 2021 18:22)      MEDICATIONS  (STANDING):  aspirin enteric coated 81 milliGRAM(s) Oral daily      FAMILY HISTORY:  No pertinent family history in first degree relatives        SOCIAL HISTORY:    [x ] Non-smoker  [ ] Smoker  [ ] Alcohol    Allergies    lisinopril (Angioedema)    Intolerances    levofloxacin (Muscle Pain)  	    REVIEW OF SYSTEMS:  CONSTITUTIONAL: No fever, weight loss, or fatigue  EYES: No eye pain, visual disturbances, or discharge  ENMT:  No difficulty hearing, tinnitus, vertigo; No sinus or throat pain  NECK: No pain or stiffness  RESPIRATORY: No cough, wheezing, chills or hemoptysis; No Shortness of Breath  CARDIOVASCULAR: as HPI  GASTROINTESTINAL: No abdominal or epigastric pain. No nausea, vomiting, or hematemesis; No diarrhea or constipation. No melena or hematochezia.  GENITOURINARY: No dysuria, frequency, hematuria, or incontinence  NEUROLOGICAL: No headaches, memory loss, loss of strength, numbness, or tremors  SKIN: No itching, burning, rashes, or lesions   	  [ ] All others negative	  [ ] Unable to obtain    PHYSICAL EXAM:    T(C): 36.9 (07-14-24 @ 10:11), Max: 37.3 (07-13-24 @ 15:54)  HR: 61 (07-14-24 @ 10:11) (61 - 88)  BP: 111/80 (07-14-24 @ 10:11) (111/80 - 171/91)  RR: 17 (07-14-24 @ 10:11) (15 - 18)  SpO2: 98% (07-14-24 @ 10:11) (96% - 99%)  Wt(kg): --  I&O's Summary    Daily Height in cm: 152.4 (13 Jul 2024 15:54)    Daily     Appearance: Normal	  Psychiatry: A & O x 3, Mood & affect appropriate  HEENT:   Normal oral mucosa, PERRL, EOMI	  Lymphatic: No lymphadenopathy  Cardiovascular: Normal S1 S2,RRR, No JVD, No murmurs  Respiratory: Lungs clear to auscultation	  Gastrointestinal:  Soft, Non-tender, + BS	  Skin: No rashes, No ecchymoses, No cyanosis	  Neurologic: Non-focal  Extremities: Normal range of motion, No clubbing, cyanosis or edema  Vascular: Peripheral pulses palpable 2+ bilaterally    TELEMETRY: 	    ECG:  	sr no acute ischemic abnl   RADIOLOGY:  OTHER: 	  	  LABS:	 	    CARDIAC MARKERS:        proBNP:     Lipid Profile:   HgA1c:   TSH:                           14.0   7.43  )-----------( 269      ( 14 Jul 2024 05:05 )             40.1     07-14    141  |  102  |  16  ----------------------------<  86  3.8   |  23  |  0.86    Ca    9.4      14 Jul 2024 05:05  Phos  3.3     07-14  Mg     2.20     07-14    TPro  6.6  /  Alb  3.6  /  TBili  0.5  /  DBili  x   /  AST  24  /  ALT  18  /  AlkPhos  98  07-13        Creatinine: 0.86 mg/dL (07-14-24 @ 05:05)  Creatinine: 0.97 mg/dL (07-13-24 @ 17:00)        ASSESSMENT/PLAN:

## 2024-07-14 NOTE — PROVIDER CONTACT NOTE (OTHER) - REASON
School RN returning call. Reviewed instructions from Dr. Saxena regarding head injury instructions and sleepiness protocols.     States that he is doing better over the last few weeks. School had a meeting with mom to go over proper bedtime routines.    As per tele tech pt had a small run of trigeminy and did not sustain

## 2024-07-14 NOTE — ED ADULT NURSE REASSESSMENT NOTE - NS ED NURSE REASSESS COMMENT FT1
Break RN: Report received from CATIE Garcia. Pt resting on stretcher. RR even and unlabored. Pending rpt troponin results. Frequent monitoring in place.
Break coverage RN: Received patient in CDU bed 1. Patient denies any pain or discomfort at this time. Patient is A&OX4, airway patent, breathing unlabored and even. Side rails up and safety maintained. Call bells within reach.
This nurse assess pt, pt is ax4, on room air, pt reports not needing anything at the moment, side rails are raised, bed in low position, no concerns are noted.
Break RN: Pt received in spot 26A. Pt offered no complains at present. Denies cp, sob, dizziness, and palpitation. Respiration even and non-labored. in NAD. NSR on monitor. Report given to CATIE Bales pt transported to CDU by PCA

## 2024-07-14 NOTE — CONSULT NOTE ADULT - ASSESSMENT
A/P  77 yo female, PMH HTN, HLD, COPD, presenting to the ED after episode of chest pain        #chest pain  -multiple cad risk factors   -no acute ischemic ecg abnl   -resolved sx  -trop wnl   -f/u echo, stress    dvt ppx    dispo pending stress    60 minutes spent on total encounter; more than 50% of the visit was spent counseling and/or coordinating care by the attending physician.

## 2024-07-15 DIAGNOSIS — I10 ESSENTIAL (PRIMARY) HYPERTENSION: ICD-10-CM

## 2024-07-15 DIAGNOSIS — J44.9 CHRONIC OBSTRUCTIVE PULMONARY DISEASE, UNSPECIFIED: ICD-10-CM

## 2024-07-15 DIAGNOSIS — Z79.899 OTHER LONG TERM (CURRENT) DRUG THERAPY: ICD-10-CM

## 2024-07-15 DIAGNOSIS — Z29.9 ENCOUNTER FOR PROPHYLACTIC MEASURES, UNSPECIFIED: ICD-10-CM

## 2024-07-15 DIAGNOSIS — R07.9 CHEST PAIN, UNSPECIFIED: ICD-10-CM

## 2024-07-15 PROCEDURE — 99233 SBSQ HOSP IP/OBS HIGH 50: CPT

## 2024-07-15 PROCEDURE — 75574 CT ANGIO HRT W/3D IMAGE: CPT | Mod: 26,MC

## 2024-07-15 PROCEDURE — 99223 1ST HOSP IP/OBS HIGH 75: CPT

## 2024-07-15 RX ORDER — HEPARIN SODIUM 50 [USP'U]/ML
5000 INJECTION, SOLUTION INTRAVENOUS EVERY 8 HOURS
Refills: 0 | Status: DISCONTINUED | OUTPATIENT
Start: 2024-07-15 | End: 2024-07-17

## 2024-07-15 RX ORDER — METOPROLOL TARTRATE 50 MG
1 TABLET ORAL
Refills: 0 | DISCHARGE

## 2024-07-15 RX ORDER — BUDESONIDE/FORMOTEROL FUMARATE 160-4.5MCG
2 HFA AEROSOL WITH ADAPTER (GRAM) INHALATION
Refills: 0 | DISCHARGE

## 2024-07-15 RX ORDER — CRANBERRY FRUIT EXTRACT 650 MG
1 CAPSULE ORAL
Refills: 0 | DISCHARGE

## 2024-07-15 RX ORDER — ENOXAPARIN SODIUM 100 MG/ML
40 INJECTION SUBCUTANEOUS EVERY 24 HOURS
Refills: 0 | Status: DISCONTINUED | OUTPATIENT
Start: 2024-07-15 | End: 2024-07-15

## 2024-07-15 RX ORDER — METOPROLOL TARTRATE 50 MG
25 TABLET ORAL DAILY
Refills: 0 | Status: DISCONTINUED | OUTPATIENT
Start: 2024-07-15 | End: 2024-07-17

## 2024-07-15 RX ORDER — HYDROCHLOROTHIAZIDE 25 MG
1 TABLET ORAL
Refills: 0 | DISCHARGE

## 2024-07-15 RX ORDER — ATORVASTATIN CALCIUM 20 MG/1
10 TABLET, FILM COATED ORAL AT BEDTIME
Refills: 0 | Status: DISCONTINUED | OUTPATIENT
Start: 2024-07-15 | End: 2024-07-17

## 2024-07-15 RX ORDER — MAGNESIUM OXIDE 400 MG/1
1 TABLET ORAL
Refills: 0 | DISCHARGE

## 2024-07-15 RX ORDER — METOPROLOL TARTRATE 50 MG
25 TABLET ORAL ONCE
Refills: 0 | Status: COMPLETED | OUTPATIENT
Start: 2024-07-15 | End: 2024-07-15

## 2024-07-15 RX ORDER — CALCIUM CARBONATE 500(1250)
1 TABLET,CHEWABLE ORAL
Refills: 0 | DISCHARGE

## 2024-07-15 RX ORDER — BUDESONIDE/FORMOTEROL FUMARATE 160-4.5MCG
2 HFA AEROSOL WITH ADAPTER (GRAM) INHALATION
Refills: 0 | Status: DISCONTINUED | OUTPATIENT
Start: 2024-07-15 | End: 2024-07-17

## 2024-07-15 RX ORDER — ATORVASTATIN CALCIUM 20 MG/1
1 TABLET, FILM COATED ORAL
Refills: 0 | DISCHARGE

## 2024-07-15 RX ORDER — MAGNESIUM OXIDE 400 MG/1
400 TABLET ORAL
Refills: 0 | Status: DISCONTINUED | OUTPATIENT
Start: 2024-07-15 | End: 2024-07-17

## 2024-07-15 RX ORDER — CALCIUM CARBONATE 500(1250)
1 TABLET,CHEWABLE ORAL DAILY
Refills: 0 | Status: DISCONTINUED | OUTPATIENT
Start: 2024-07-15 | End: 2024-07-17

## 2024-07-15 RX ADMIN — HEPARIN SODIUM 5000 UNIT(S): 50 INJECTION, SOLUTION INTRAVENOUS at 23:11

## 2024-07-15 RX ADMIN — ATORVASTATIN CALCIUM 10 MILLIGRAM(S): 20 TABLET, FILM COATED ORAL at 23:11

## 2024-07-15 RX ADMIN — Medication 25 MILLIGRAM(S): at 09:29

## 2024-07-15 RX ADMIN — ASPIRIN 81 MILLIGRAM(S): 325 TABLET, FILM COATED ORAL at 12:33

## 2024-07-15 NOTE — ED CDU PROVIDER DISPOSITION NOTE - CLINICAL COURSE
78-year-old female with past medical history of hypertension, hyperlipidemia, COPD presents with episode of chest pain 2 days ago.  Patient reports chest pain is nonpleuritic, nonexertional.  Denies any fevers, chills, palpitations, shortness of breath.  In the emergency department, troponin T10 and 10.  Chest x-ray showed mild bibasilar platelike atelectasis, grossly unchanged.  Echocardiogram conclusions were nonactionable.  Patient was evaluated by cardiology with recommendation to have stress test. pt drank coffee prior to NST, and cardio was called and recommended CTA coronaries which revealed extensive calcifications, with moderate disease of LAD and circumflex stenosis. plan for admission for cardiac cath.

## 2024-07-15 NOTE — ED CDU PROVIDER SUBSEQUENT DAY NOTE - CLINICAL SUMMARY MEDICAL DECISION MAKING FREE TEXT BOX
Tele monitoring, stress test, echo, Tele Doc of Day evaluation, general observation care / monitoring.
Patient is an 78-year-old female with past medical history of hypertension, hyperlipidemia, COPD presents with episode of chest pain 2 days ago.  Patient reports chest pain is nonpleuritic, nonexertional.  Denies any fevers, chills, palpitations, shortness of breath.  In the emergency department, troponin T10 and 10.  Chest x-ray showed mild bibasilar platelike atelectasis, grossly unchanged.  Echocardiogram conclusions were nonactionable.  Patient was evaluated by cardiology with recommendation to have stress test.  Patient reports no active complaints at this time.  This is a patient with an episode of chest pain.  Plan to have stress test performed.

## 2024-07-15 NOTE — H&P ADULT - NSHPLABSRESULTS_GEN_ALL_CORE
14.0   7.43  )-----------( 269      ( 14 Jul 2024 05:05 )             40.1     07-14    141  |  102  |  16  ----------------------------<  86  3.8   |  23  |  0.86    Ca    9.4      14 Jul 2024 05:05  Phos  3.3     07-14  Mg     2.20     07-14      CAPILLARY BLOOD GLUCOSE          Urinalysis Basic - ( 14 Jul 2024 05:05 )    Color: x / Appearance: x / SG: x / pH: x  Gluc: 86 mg/dL / Ketone: x  / Bili: x / Urobili: x   Blood: x / Protein: x / Nitrite: x   Leuk Esterase: x / RBC: x / WBC x   Sq Epi: x / Non Sq Epi: x / Bacteria: x      Vital Signs Last 24 Hrs  T(C): 37.1 (15 Jul 2024 22:03), Max: 37.1 (15 Jul 2024 22:03)  T(F): 98.7 (15 Jul 2024 22:03), Max: 98.7 (15 Jul 2024 22:03)  HR: 77 (15 Jul 2024 22:03) (68 - 85)  BP: 127/75 (15 Jul 2024 22:03) (109/59 - 149/70)  BP(mean): --  RR: 16 (15 Jul 2024 22:03) (16 - 19)  SpO2: 95% (15 Jul 2024 22:03) (95% - 99%)    Parameters below as of 15 Jul 2024 22:03  Patient On (Oxygen Delivery Method): room air

## 2024-07-15 NOTE — H&P ADULT - PROBLEM SELECTOR PLAN 3
· Currently ventilated  · BIPAP as needed when extubated Madhavi Yip)  Family Medicine  [facility] [place] [address]  Phone: [phone]  Fax: [phone]  Follow Up Time: 4-6 Days    aHyes Vickers)  Critical Care Medicine; Internal Medicine; Pulmonary Disease; Sleep Medicine  [address] [place] [address]  Phone: [phone]  Fax: [phone]  Follow Up Time: 4-6 Days   c/w Symbicort

## 2024-07-15 NOTE — ED CDU PROVIDER SUBSEQUENT DAY NOTE - ATTENDING APP SHARED VISIT CONTRIBUTION OF CARE
pt with chest pain, placed in cdu for echo, stress and tele eval  feels improved today
Patient seen on morning rounds, has not had any chest pain since coming to the ED.  Echo was normal, troponin normal x 2, unfortunately patient did drink coffee this morning again and is unable to get a stress test.  I spoke with cardiology, they are recommending CT coronary instead.

## 2024-07-15 NOTE — ED CDU PROVIDER DISPOSITION NOTE - ATTENDING CONTRIBUTION TO CARE
I performed a face-to-face evaluation of the patient and performed a history and physical examination. I agree with the history and physical examination. I personally saw the patient with the PA and performed a substantive portion of the visit including all aspects of the medical decision making.

## 2024-07-15 NOTE — PHARMACOTHERAPY INTERVENTION NOTE - COMMENTS
Medication history is complete. Medication list updated in Outpatient Medication Record ([facility]). Please call spectra [id number] if you have any questions. Sources: [facility], Patient    Home Medications:  ascorbic acid: 1 tab(s) orally once a day   atorvastatin 10 mg oral tablet: 1 tab(s) orally once a day   budesonide-formoterol 160 mcg-4.5 mcg/inh inhalation aerosol: 2 puff(s) inhaled 2 times a day   calcium carbonate: 1 tab(s) orally once a day   cholecalciferol: 1 cap(s) orally once a day   Fish Oil oral capsule: 1 cap(s) orally once a day   hydroCHLOROthiazide 12.5 mg oral tablet: 1 tab(s) orally once a day   magnesium oxide 400 mg oral tablet: 1 tab(s) orally 2 times a day   metoprolol succinate 25 mg oral tablet, extended release: 1 tab(s) orally once a day   Multiple Vitamins oral tablet: 1 tab(s) orally once a day   Vitamin B Complex oral capsule: 1 cap(s) orally once a day

## 2024-07-15 NOTE — H&P ADULT - HISTORY OF PRESENT ILLNESS
78-year-old female with past medical history of hypertension, hyperlipidemia, COPD presents with episode of chest pain 2 days ago.  Patient reports chest pain is nonpleuritic, nonexertional.  Denies any fevers, chills, palpitations, shortness of breath.  In the emergency department, troponin T10 and 10.  Chest x-ray showed mild bibasilar platelike atelectasis, grossly unchanged.  Echocardiogram conclusions were nonactionable.  Patient was evaluated by cardiology with recommendation to have stress test. pt drank coffee prior to NST, and cardio was called and recommended CTA coronaries which revealed extensive calcifications, with moderate disease of LAD and circumflex stenosis. plan for admission for cardiac cath. 78-year-old female with past medical history of hypertension, hyperlipidemia, COPD presents with episode of chest pain 2 days ago.  Patient reports chest pain is nonpleuritic, nonexertional.  Denies any fevers, chills, palpitations, shortness of breath.  In the emergency department, troponin T10 and 10.  Chest x-ray showed mild bibasilar  atelectasis, grossly unchanged.  Echocardiogram with normal lv function, ef 57, no valvulopathy noted..  Patient was evaluated by cardiology with recommendation to have stress test. pt drank coffee prior to NST, and cardio was called and recommended CTA coronaries which revealed extensive calcifications, with moderate disease of LAD and circumflex stenosis. plan for admission for cardiac cath.

## 2024-07-15 NOTE — H&P ADULT - PROBLEM SELECTOR PLAN 1
-Given CTA of coronaries limited secondary to extensive calcification as well  as calcified plaque   causing  at least moderate LAD and circumflex stenosis, pt planned for cath tomorrow  -daily aspirin started  -c/.w Lipitor, lipid panel : , ldl 78  -c/w BB

## 2024-07-15 NOTE — ED CDU PROVIDER SUBSEQUENT DAY NOTE - HISTORY
Patient is an 78-year-old female with past medical history of hypertension, hyperlipidemia, COPD presents with episode of chest pain 2 days ago.  Patient reports chest pain is nonpleuritic, nonexertional.  Denies any fevers, chills, palpitations, shortness of breath.  In the emergency department, troponin T10 and 10.  Chest x-ray showed mild bibasilar platelike atelectasis, grossly unchanged.  Echocardiogram conclusions were nonactionable.  Patient was evaluated by cardiology with recommendation to have stress test.  Patient reports no active complaints at this time.
79 yo female, PMH HTN, HLD, COPD, presenting to the ED after episode of chest pain that occurred midday on 7/13/24.  ROS otherwise NC.  In the ED, VSS, pt afebrile.  EKG NSR @ 85 bpm.  WBC 8.42, Hb 13.1; CMP unremarkable; troponin 10 ----> 10.  CXR was performed; no emergent findings noted (official radiology report is pending at this time).  Pt was sent to CDU for continued care plan:  Tele monitoring, stress test, echo, Tele Doc of Day evaluation, general observation care / monitoring.  In the interim, pt objectively noted to be resting comfortably; pt has been clinically stable; no issues thus far.

## 2024-07-15 NOTE — ED CDU PROVIDER SUBSEQUENT DAY NOTE - NSICDXPASTMEDICALHX_GEN_ALL_CORE_FT
PAST MEDICAL HISTORY:  Chronic obstructive pulmonary disease (COPD)     HLD (hyperlipidemia)     HTN (hypertension)     Kidney stone     
PAST MEDICAL HISTORY:  Chronic obstructive pulmonary disease (COPD)     HLD (hyperlipidemia)     HTN (hypertension)     Kidney stone

## 2024-07-15 NOTE — ED CDU PROVIDER SUBSEQUENT DAY NOTE - CONSTITUTIONAL NEGATIVE STATEMENT, MLM
Health Maintenance Due   Topic Date Due   • Shingles Vaccine (1 of 2) 10/26/2013   • Diabetes Foot Exam  12/20/2017   • Colorectal Cancer Screening-Fecal Occult Blood  12/13/2019       Patient is due for topics as listed above but is not proceeding with Immunization(s) Shingles and Colorectal Cancer Screening: Colonoscopy at this time.            no fever and no chills.

## 2024-07-15 NOTE — ED CDU PROVIDER SUBSEQUENT DAY NOTE - NS ED ATTENDING STATEMENT MOD
This was a shared visit with the BALA. I reviewed and verified the documentation.
This was a shared visit with the BALA. I reviewed and verified the documentation.

## 2024-07-15 NOTE — H&P ADULT - NSHPREVIEWOFSYSTEMS_GEN_ALL_CORE
Review of Systems:   CONSTITUTIONAL: No fever  EYES: No eye pain, visual disturbances, or discharge  ENMT:  No difficulty hearing, tinnitus, vertigo; No sinus or throat pain  NECK: No pain or stiffness  RESPIRATORY: No cough; No shortness of breath  CARDIOVASCULAR: No current chest pain, palpitations, dizziness, or leg swelling  GASTROINTESTINAL: No abdominal or epigastric pain. No nausea, vomiting  GENITOURINARY: No dysuria  NEUROLOGICAL: No headaches  SKIN: No itching, burning, rashes, or lesions   MUSCULOSKELETAL: No joint pain or swelling; No muscle, back, or extremity pain

## 2024-07-16 LAB
ANION GAP SERPL CALC-SCNC: 13 MMOL/L — SIGNIFICANT CHANGE UP (ref 7–14)
APTT BLD: 32.9 SEC — SIGNIFICANT CHANGE UP (ref 24.5–35.6)
BLD GP AB SCN SERPL QL: NEGATIVE — SIGNIFICANT CHANGE UP
BUN SERPL-MCNC: 21 MG/DL — SIGNIFICANT CHANGE UP (ref 7–23)
CALCIUM SERPL-MCNC: 8.8 MG/DL — SIGNIFICANT CHANGE UP (ref 8.4–10.5)
CHLORIDE SERPL-SCNC: 104 MMOL/L — SIGNIFICANT CHANGE UP (ref 98–107)
CO2 SERPL-SCNC: 22 MMOL/L — SIGNIFICANT CHANGE UP (ref 22–31)
CREAT SERPL-MCNC: 0.84 MG/DL — SIGNIFICANT CHANGE UP (ref 0.5–1.3)
EGFR: 70 ML/MIN/1.73M2 — SIGNIFICANT CHANGE UP
GLUCOSE SERPL-MCNC: 93 MG/DL — SIGNIFICANT CHANGE UP (ref 70–99)
HCT VFR BLD CALC: 41.4 % — SIGNIFICANT CHANGE UP (ref 34.5–45)
HGB BLD-MCNC: 14.4 G/DL — SIGNIFICANT CHANGE UP (ref 11.5–15.5)
INR BLD: 0.91 RATIO — SIGNIFICANT CHANGE UP (ref 0.85–1.18)
MCHC RBC-ENTMCNC: 30.1 PG — SIGNIFICANT CHANGE UP (ref 27–34)
MCHC RBC-ENTMCNC: 34.8 GM/DL — SIGNIFICANT CHANGE UP (ref 32–36)
MCV RBC AUTO: 86.4 FL — SIGNIFICANT CHANGE UP (ref 80–100)
NRBC # BLD: 0 /100 WBCS — SIGNIFICANT CHANGE UP (ref 0–0)
NRBC # FLD: 0 K/UL — SIGNIFICANT CHANGE UP (ref 0–0)
PLATELET # BLD AUTO: 286 K/UL — SIGNIFICANT CHANGE UP (ref 150–400)
POTASSIUM SERPL-MCNC: 3.9 MMOL/L — SIGNIFICANT CHANGE UP (ref 3.5–5.3)
POTASSIUM SERPL-SCNC: 3.9 MMOL/L — SIGNIFICANT CHANGE UP (ref 3.5–5.3)
PROTHROM AB SERPL-ACNC: 10.2 SEC — SIGNIFICANT CHANGE UP (ref 9.5–13)
RBC # BLD: 4.79 M/UL — SIGNIFICANT CHANGE UP (ref 3.8–5.2)
RBC # FLD: 13 % — SIGNIFICANT CHANGE UP (ref 10.3–14.5)
RH IG SCN BLD-IMP: POSITIVE — SIGNIFICANT CHANGE UP
SODIUM SERPL-SCNC: 139 MMOL/L — SIGNIFICANT CHANGE UP (ref 135–145)
WBC # BLD: 7.39 K/UL — SIGNIFICANT CHANGE UP (ref 3.8–10.5)
WBC # FLD AUTO: 7.39 K/UL — SIGNIFICANT CHANGE UP (ref 3.8–10.5)

## 2024-07-16 PROCEDURE — 93458 L HRT ARTERY/VENTRICLE ANGIO: CPT | Mod: 26

## 2024-07-16 RX ORDER — ACETAMINOPHEN 325 MG
650 TABLET ORAL EVERY 6 HOURS
Refills: 0 | Status: DISCONTINUED | OUTPATIENT
Start: 2024-07-16 | End: 2024-07-17

## 2024-07-16 RX ORDER — SODIUM CHLORIDE 0.9 % (FLUSH) 0.9 %
1000 SYRINGE (ML) INJECTION
Refills: 0 | Status: DISCONTINUED | OUTPATIENT
Start: 2024-07-16 | End: 2024-07-17

## 2024-07-16 RX ADMIN — Medication 2 PUFF(S): at 21:18

## 2024-07-16 RX ADMIN — HEPARIN SODIUM 5000 UNIT(S): 50 INJECTION, SOLUTION INTRAVENOUS at 06:24

## 2024-07-16 RX ADMIN — HEPARIN SODIUM 5000 UNIT(S): 50 INJECTION, SOLUTION INTRAVENOUS at 16:47

## 2024-07-16 RX ADMIN — Medication 1000 UNIT(S): at 17:24

## 2024-07-16 RX ADMIN — ATORVASTATIN CALCIUM 10 MILLIGRAM(S): 20 TABLET, FILM COATED ORAL at 21:17

## 2024-07-16 RX ADMIN — Medication 1 TABLET(S): at 17:24

## 2024-07-16 RX ADMIN — Medication 500 MILLIGRAM(S): at 17:24

## 2024-07-16 RX ADMIN — HEPARIN SODIUM 5000 UNIT(S): 50 INJECTION, SOLUTION INTRAVENOUS at 21:17

## 2024-07-16 RX ADMIN — Medication 25 MILLIGRAM(S): at 06:24

## 2024-07-16 RX ADMIN — MAGNESIUM OXIDE 400 MILLIGRAM(S): 400 TABLET ORAL at 17:24

## 2024-07-16 RX ADMIN — ASPIRIN 81 MILLIGRAM(S): 325 TABLET, FILM COATED ORAL at 08:36

## 2024-07-16 RX ADMIN — Medication 100 MILLILITER(S): at 12:16

## 2024-07-16 NOTE — ASU PATIENT PROFILE, ADULT - FALL HARM RISK - UNIVERSAL INTERVENTIONS
Bed in lowest position, wheels locked, appropriate side rails in place/Call bell, personal items and telephone in reach/Instruct patient to call for assistance before getting out of bed or chair/Non-slip footwear when patient is out of bed/[place] to call system/Physically safe environment - no spills, clutter or unnecessary equipment/Purposeful Proactive Rounding/Room/bathroom lighting operational, light cord in reach

## 2024-07-16 NOTE — PRE PROCEDURE NOTE - PRE PROCEDURE EVALUATION
Pre-sedation Evaluation    Urine pregnancy: N/A  Dentures: None  Last PO intake: 7/15/24 at 11pm  Obstructive sleep apnea: No  Aspiration risk: No  Mallampati score: 2  ASA Classification: 2  Prior Sedative or Anesthesia Experience: No complications  Informed consent by responsible adult: Yes  Responsible adult escort: Yes  Based on today's assessment, anesthesia consult requested: No

## 2024-07-16 NOTE — CHART NOTE - NSCHARTNOTEFT_GEN_A_CORE
Patient is s/p cardiac cath via right radial access.  Site is stable with mild ecchymosis, no hematoma, active bleed or swelling.  Dressing not present.  Radial pulse is palpable.  Patient denies pain, numbness, tingling, CP, SOB. VSS. Will continue to monitor.     T(C): 36.7 (07-16-24 @ 15:55), Max: 37.1 (07-15-24 @ 22:03)  HR: 79 (07-16-24 @ 15:55) (70 - 84)  BP: 141/91 (07-16-24 @ 21:17) (119/56 - 145/69)  RR: 18 (07-16-24 @ 21:17) (16 - 26)  SpO2: 99% (07-16-24 @ 21:17) (94% - 100%)

## 2024-07-17 ENCOUNTER — TRANSCRIPTION ENCOUNTER (OUTPATIENT)
Age: 79
End: 2024-07-17

## 2024-07-17 VITALS
DIASTOLIC BLOOD PRESSURE: 70 MMHG | OXYGEN SATURATION: 100 % | HEART RATE: 78 BPM | RESPIRATION RATE: 18 BRPM | TEMPERATURE: 98 F | SYSTOLIC BLOOD PRESSURE: 128 MMHG

## 2024-07-17 LAB
ANION GAP SERPL CALC-SCNC: 11 MMOL/L — SIGNIFICANT CHANGE UP (ref 7–14)
BUN SERPL-MCNC: 20 MG/DL — SIGNIFICANT CHANGE UP (ref 7–23)
CALCIUM SERPL-MCNC: 9.2 MG/DL — SIGNIFICANT CHANGE UP (ref 8.4–10.5)
CHLORIDE SERPL-SCNC: 103 MMOL/L — SIGNIFICANT CHANGE UP (ref 98–107)
CO2 SERPL-SCNC: 25 MMOL/L — SIGNIFICANT CHANGE UP (ref 22–31)
CREAT SERPL-MCNC: 0.77 MG/DL — SIGNIFICANT CHANGE UP (ref 0.5–1.3)
EGFR: 78 ML/MIN/1.73M2 — SIGNIFICANT CHANGE UP
GLUCOSE SERPL-MCNC: 91 MG/DL — SIGNIFICANT CHANGE UP (ref 70–99)
HCT VFR BLD CALC: 43 % — SIGNIFICANT CHANGE UP (ref 34.5–45)
HGB BLD-MCNC: 14.4 G/DL — SIGNIFICANT CHANGE UP (ref 11.5–15.5)
MAGNESIUM SERPL-MCNC: 2.4 MG/DL — SIGNIFICANT CHANGE UP (ref 1.6–2.6)
MCHC RBC-ENTMCNC: 29.4 PG — SIGNIFICANT CHANGE UP (ref 27–34)
MCHC RBC-ENTMCNC: 33.5 GM/DL — SIGNIFICANT CHANGE UP (ref 32–36)
MCV RBC AUTO: 87.8 FL — SIGNIFICANT CHANGE UP (ref 80–100)
NRBC # BLD: 0 /100 WBCS — SIGNIFICANT CHANGE UP (ref 0–0)
NRBC # FLD: 0 K/UL — SIGNIFICANT CHANGE UP (ref 0–0)
PHOSPHATE SERPL-MCNC: 3.3 MG/DL — SIGNIFICANT CHANGE UP (ref 2.5–4.5)
PLATELET # BLD AUTO: 298 K/UL — SIGNIFICANT CHANGE UP (ref 150–400)
POTASSIUM SERPL-MCNC: 3.8 MMOL/L — SIGNIFICANT CHANGE UP (ref 3.5–5.3)
POTASSIUM SERPL-SCNC: 3.8 MMOL/L — SIGNIFICANT CHANGE UP (ref 3.5–5.3)
RBC # BLD: 4.9 M/UL — SIGNIFICANT CHANGE UP (ref 3.8–5.2)
RBC # FLD: 12.9 % — SIGNIFICANT CHANGE UP (ref 10.3–14.5)
SODIUM SERPL-SCNC: 139 MMOL/L — SIGNIFICANT CHANGE UP (ref 135–145)
WBC # BLD: 8.1 K/UL — SIGNIFICANT CHANGE UP (ref 3.8–10.5)
WBC # FLD AUTO: 8.1 K/UL — SIGNIFICANT CHANGE UP (ref 3.8–10.5)

## 2024-07-17 RX ORDER — ASPIRIN 325 MG/1
1 TABLET, FILM COATED ORAL
Qty: 0 | Refills: 0 | DISCHARGE
Start: 2024-07-17

## 2024-07-17 RX ADMIN — Medication 1 TABLET(S): at 11:41

## 2024-07-17 RX ADMIN — HEPARIN SODIUM 5000 UNIT(S): 50 INJECTION, SOLUTION INTRAVENOUS at 05:40

## 2024-07-17 RX ADMIN — Medication 500 MILLIGRAM(S): at 11:41

## 2024-07-17 RX ADMIN — Medication 25 MILLIGRAM(S): at 05:41

## 2024-07-17 RX ADMIN — Medication 2 PUFF(S): at 09:06

## 2024-07-17 RX ADMIN — Medication 1000 UNIT(S): at 11:41

## 2024-07-17 RX ADMIN — ASPIRIN 81 MILLIGRAM(S): 325 TABLET, FILM COATED ORAL at 11:40

## 2024-07-17 RX ADMIN — MAGNESIUM OXIDE 400 MILLIGRAM(S): 400 TABLET ORAL at 09:06

## 2024-07-17 RX ADMIN — HEPARIN SODIUM 5000 UNIT(S): 50 INJECTION, SOLUTION INTRAVENOUS at 16:15

## 2024-07-17 NOTE — DISCHARGE NOTE PROVIDER - NSDCCPCAREPLAN_GEN_ALL_CORE_FT
PRINCIPAL DISCHARGE DIAGNOSIS  Diagnosis: Chest pain  Assessment and Plan of Treatment: You were admitted to the hospital with chest pain.  A CTA coronary scan was abnormal.  You had a cardiac catheterization of your heart which showed no blockages.  Follow up with your cardiologist for further management and monitoring.      SECONDARY DISCHARGE DIAGNOSES  Diagnosis: HTN (hypertension)  Assessment and Plan of Treatment: Please continue medications as currently prescribed.  Please continue low salt, low cholesterol (DASH) diet. Follow up with your primary care doctor for further management.    Diagnosis: COPD (chronic obstructive pulmonary disease)  Assessment and Plan of Treatment: Please continue medications as currently prescribed.  Follow up with your primary care doctor for further management.

## 2024-07-17 NOTE — DISCHARGE NOTE PROVIDER - NSDCMRMEDTOKEN_GEN_ALL_CORE_FT
ascorbic acid: 1 tab(s) orally once a day  aspirin 81 mg oral delayed release tablet: 1 tab(s) orally once a day  atorvastatin 10 mg oral tablet: 1 tab(s) orally once a day  budesonide-formoterol 160 mcg-4.5 mcg/inh inhalation aerosol: 2 puff(s) inhaled 2 times a day  calcium carbonate: 1 tab(s) orally once a day  cholecalciferol: 1 cap(s) orally once a day  Fish Oil oral capsule: 1 cap(s) orally once a day  hydroCHLOROthiazide 12.5 mg oral tablet: 1 tab(s) orally once a day  magnesium oxide 400 mg oral tablet: 1 tab(s) orally 2 times a day  metoprolol succinate 25 mg oral tablet, extended release: 1 tab(s) orally once a day  Multiple Vitamins oral tablet: 1 tab(s) orally once a day  Vitamin B Complex oral capsule: 1 cap(s) orally once a day

## 2024-07-17 NOTE — DISCHARGE NOTE NURSING/CASE MANAGEMENT/SOCIAL WORK - NSDCPEFALRISK_GEN_ALL_CORE
For information on Fall & Injury Prevention, visit: [email or web address] OR  [email or web address] OR  https://www.cdc.gov/steadi/patient.html

## 2024-07-17 NOTE — DISCHARGE NOTE PROVIDER - NSDCFUADDAPPT_GEN_ALL_CORE_FT
Follow up with your Primary Care Doctor and Cardiologist within one week of discharge. If you do not have one, you can call the medicine clinic at [phone] to make an appointment.

## 2024-07-17 NOTE — PROGRESS NOTE ADULT - ASSESSMENT
A/P  77 yo female, PMH HTN, HLD, COPD, presenting to the ED after episode of chest pain        #chest pain  -multiple cad risk factors   -no acute ischemic ecg abnl   -resolved sx  -trop wnl   -echo nl  -CTA abnl  -cardiac cath revealed luminal disease    dvt ppx    dc home today on daily asa        35 minutes spent on total encounter; more than 50% of the visit was spent counseling and/or coordinating care by the attending physician.      
A/P  77 yo female, PMH HTN, HLD, COPD, presenting to the ED after episode of chest pain        #chest pain  -multiple cad risk factors   -no acute ischemic ecg abnl   -resolved sx  -trop wnl   -echo nl  -f/u stress    dvt ppx    dispo pending stress    35 minutes spent on total encounter; more than 50% of the visit was spent counseling and/or coordinating care by the attending physician.  
A/P  77 yo female, PMH HTN, HLD, COPD, presenting to the ED after episode of chest pain        #chest pain  -multiple cad risk factors   -no acute ischemic ecg abnl   -resolved sx  -trop wnl   -echo nl  -CTA abnl, pending cardiac cath    dvt ppx    dispo pending cath    35 minutes spent on total encounter; more than 50% of the visit was spent counseling and/or coordinating care by the attending physician.      35 minutes spent on total encounter; more than 50% of the visit was spent counseling and/or coordinating care by the attending physician.

## 2024-07-17 NOTE — DISCHARGE NOTE NURSING/CASE MANAGEMENT/SOCIAL WORK - PATIENT PORTAL LINK FT
You can access the FollowMyHealth Patient Portal offered by [facility] by registering at the following website: [email or web address]. By joining [facility]’s FollowMyHealth portal, you will also be able to view your health information using other applications (apps) compatible with our system.

## 2024-07-17 NOTE — DISCHARGE NOTE PROVIDER - PROVIDER TOKENS
FREE:[LAST:[Your Primary Care Doctor],PHONE:[(   )    -],FAX:[(   )    -]],FREE:[LAST:[Your Cardiologist],PHONE:[(   )    -],FAX:[(   )    -]]

## 2024-07-17 NOTE — PATIENT PROFILE ADULT - FALL HARM RISK - HARM RISK INTERVENTIONS

## 2024-07-17 NOTE — DISCHARGE NOTE PROVIDER - CARE PROVIDER_API CALL
Your Primary Care Doctor,   Phone: (   )    -  Fax: (   )    -  Follow Up Time:     Your Cardiologist,   Phone: (   )    -  Fax: (   )    -  Follow Up Time:

## 2024-07-17 NOTE — PROGRESS NOTE ADULT - SUBJECTIVE AND OBJECTIVE BOX
CARDIOLOGY FOLLOW UP - Dr. Guzman  Date of Service:   CC    Review of Systems:  Constitutional: No fever, weight loss, or fatigue  Respiratory: No cough, wheezing, or hemoptysis, no shortness of breath  Cardiovascular: No chest pain, palpitations, passing out, dizziness, or leg swelling  Gastrointestinal: No abd or epigastric pain. No nausea, vomiting, or hematemesis; no diarrhea or consiptaiton, no melena or hematochezia  Vascular: No edema     TELEMETRY:    PHYSICAL EXAM:  T(C): 36.8 (07-17-24 @ 09:00), Max: 37 (07-17-24 @ 03:30)  HR: 86 (07-17-24 @ 09:00) (73 - 86)  BP: 129/68 (07-17-24 @ 09:00) (121/68 - 145/69)  RR: 18 (07-17-24 @ 09:00) (17 - 26)  SpO2: 97% (07-17-24 @ 09:00) (94% - 100%)  Wt(kg): --  I&O's Summary      Appearance: Normal	  Cardiovascular: Normal S1 S2,RRR, No JVD, No murmurs  Respiratory: Lungs clear to auscultation	  Gastrointestinal:  Soft, Non-tender, + BS	  Extremities: Normal range of motion, No clubbing, cyanosis or edema  Vascular: Peripheral pulses palpable 2+ bilaterally       Home Medications:  ascorbic acid: 1 tab(s) orally once a day (15 Jul 2024 17:20)  atorvastatin 10 mg oral tablet: 1 tab(s) orally once a day (15 Jul 2024 17:20)  budesonide-formoterol 160 mcg-4.5 mcg/inh inhalation aerosol: 2 puff(s) inhaled 2 times a day (15 Jul 2024 17:20)  calcium carbonate: 1 tab(s) orally once a day (15 Jul 2024 17:20)  cholecalciferol: 1 cap(s) orally once a day (15 Jul 2024 17:20)  Fish Oil oral capsule: 1 cap(s) orally once a day (15 Jul 2024 17:20)  hydroCHLOROthiazide 12.5 mg oral tablet: 1 tab(s) orally once a day (15 Jul 2024 17:20)  magnesium oxide 400 mg oral tablet: 1 tab(s) orally 2 times a day (15 Jul 2024 17:20)  metoprolol succinate 25 mg oral tablet, extended release: 1 tab(s) orally once a day (15 Jul 2024 17:20)  Multiple Vitamins oral tablet: 1 tab(s) orally once a day (15 Jul 2024 17:20)  Vitamin B Complex oral capsule: 1 cap(s) orally once a day (15 Jul 2024 17:20)        MEDICATIONS  (STANDING):  ascorbic acid 500 milliGRAM(s) Oral daily  aspirin enteric coated 81 milliGRAM(s) Oral daily  atorvastatin 10 milliGRAM(s) Oral at bedtime  budesonide 160 MICROgram(s)/formoterol 4.5 MICROgram(s) Inhaler 2 Puff(s) Inhalation two times a day  calcium carbonate    500 mG (Tums) Chewable 1 Tablet(s) Chew daily  cholecalciferol 1000 Unit(s) Oral daily  heparin   Injectable 5000 Unit(s) SubCutaneous every 8 hours  hydrochlorothiazide 12.5 milliGRAM(s) Oral daily  magnesium oxide 400 milliGRAM(s) Oral two times a day with meals  metoprolol succinate ER 25 milliGRAM(s) Oral daily  multivitamin 1 Tablet(s) Oral daily  sodium chloride 0.9%. 1000 milliLiter(s) (100 mL/Hr) IV Continuous <Continuous>        EKG:  RADIOLOGY:  DIAGNOSTIC TESTING:  [ ] Echocardiogram:  [ ] Catherterization:  [ ] Stress Test:  OTHER:     LABS:	 	                          14.4   8.10  )-----------( 298      ( 17 Jul 2024 05:37 )             43.0     07-17    139  |  103  |  20  ----------------------------<  91  3.8   |  25  |  0.77    Ca    9.2      17 Jul 2024 05:37  Phos  3.3     07-17  Mg     2.40     07-17        PT/INR - ( 16 Jul 2024 05:35 )   PT: 10.2 sec;   INR: 0.91 ratio         PTT - ( 16 Jul 2024 05:35 )  PTT:32.9 sec    CARDIAC MARKERS:                  
CARDIOLOGY FOLLOW UP - Dr. Guzman  Date of Service: 7/17/2024  CC: s/p cardiac cath yest, no significant disease found    Review of Systems:  Constitutional: No fever, weight loss, or fatigue  Respiratory: No cough, wheezing, or hemoptysis, no shortness of breath  Cardiovascular: No chest pain, palpitations, passing out, dizziness, or leg swelling  Gastrointestinal: No abd or epigastric pain. No nausea, vomiting, or hematemesis; no diarrhea or consiptaiton, no melena or hematochezia  Vascular: No edema     TELEMETRY:    PHYSICAL EXAM:  T(C): 36.8 (07-17-24 @ 09:00), Max: 37 (07-17-24 @ 03:30)  HR: 86 (07-17-24 @ 09:00) (73 - 86)  BP: 129/68 (07-17-24 @ 09:00) (121/68 - 145/69)  RR: 18 (07-17-24 @ 09:00) (17 - 26)  SpO2: 97% (07-17-24 @ 09:00) (94% - 100%)  Wt(kg): --  I&O's Summary      Appearance: Normal	  Cardiovascular: Normal S1 S2,RRR, No JVD, No murmurs  Respiratory: Lungs clear to auscultation	  Gastrointestinal:  Soft, Non-tender, + BS	  Extremities: Normal range of motion, No clubbing, cyanosis or edema  Vascular: Peripheral pulses palpable 2+ bilaterally       Home Medications:  ascorbic acid: 1 tab(s) orally once a day (15 Jul 2024 17:20)  atorvastatin 10 mg oral tablet: 1 tab(s) orally once a day (15 Jul 2024 17:20)  budesonide-formoterol 160 mcg-4.5 mcg/inh inhalation aerosol: 2 puff(s) inhaled 2 times a day (15 Jul 2024 17:20)  calcium carbonate: 1 tab(s) orally once a day (15 Jul 2024 17:20)  cholecalciferol: 1 cap(s) orally once a day (15 Jul 2024 17:20)  Fish Oil oral capsule: 1 cap(s) orally once a day (15 Jul 2024 17:20)  hydroCHLOROthiazide 12.5 mg oral tablet: 1 tab(s) orally once a day (15 Jul 2024 17:20)  magnesium oxide 400 mg oral tablet: 1 tab(s) orally 2 times a day (15 Jul 2024 17:20)  metoprolol succinate 25 mg oral tablet, extended release: 1 tab(s) orally once a day (15 Jul 2024 17:20)  Multiple Vitamins oral tablet: 1 tab(s) orally once a day (15 Jul 2024 17:20)  Vitamin B Complex oral capsule: 1 cap(s) orally once a day (15 Jul 2024 17:20)        MEDICATIONS  (STANDING):  ascorbic acid 500 milliGRAM(s) Oral daily  aspirin enteric coated 81 milliGRAM(s) Oral daily  atorvastatin 10 milliGRAM(s) Oral at bedtime  budesonide 160 MICROgram(s)/formoterol 4.5 MICROgram(s) Inhaler 2 Puff(s) Inhalation two times a day  calcium carbonate    500 mG (Tums) Chewable 1 Tablet(s) Chew daily  cholecalciferol 1000 Unit(s) Oral daily  heparin   Injectable 5000 Unit(s) SubCutaneous every 8 hours  hydrochlorothiazide 12.5 milliGRAM(s) Oral daily  magnesium oxide 400 milliGRAM(s) Oral two times a day with meals  metoprolol succinate ER 25 milliGRAM(s) Oral daily  multivitamin 1 Tablet(s) Oral daily  sodium chloride 0.9%. 1000 milliLiter(s) (100 mL/Hr) IV Continuous <Continuous>        EKG:  RADIOLOGY:  DIAGNOSTIC TESTING:  [ ] Echocardiogram:  [ ] Catherterization:  [ ] Stress Test:  OTHER:     LABS:	 	                          14.4   8.10  )-----------( 298      ( 17 Jul 2024 05:37 )             43.0     07-17    139  |  103  |  20  ----------------------------<  91  3.8   |  25  |  0.77    Ca    9.2      17 Jul 2024 05:37  Phos  3.3     07-17  Mg     2.40     07-17        PT/INR - ( 16 Jul 2024 05:35 )   PT: 10.2 sec;   INR: 0.91 ratio         PTT - ( 16 Jul 2024 05:35 )  PTT:32.9 sec    CARDIAC MARKERS:                  
CARDIOLOGY FOLLOW UP - Dr. Guzman  Date of Service: 7/15/2024  CC: no events    Review of Systems:  Constitutional: No fever, weight loss, or fatigue  Respiratory: No cough, wheezing, or hemoptysis, no shortness of breath  Cardiovascular: No chest pain, palpitations, passing out, dizziness, or leg swelling  Gastrointestinal: No abd or epigastric pain. No nausea, vomiting, or hematemesis; no diarrhea or consiptaiton, no melena or hematochezia  Vascular: No edema     TELEMETRY:    PHYSICAL EXAM:  T(C): 36.6 (07-15-24 @ 10:05), Max: 37.3 (07-14-24 @ 14:01)  HR: 85 (07-15-24 @ 10:05) (68 - 86)  BP: 145/80 (07-15-24 @ 10:05) (129/79 - 149/70)  RR: 16 (07-15-24 @ 10:05) (16 - 19)  SpO2: 99% (07-15-24 @ 10:05) (95% - 99%)  Wt(kg): --  I&O's Summary      Appearance: Normal	  Cardiovascular: Normal S1 S2,RRR, No JVD, No murmurs  Respiratory: Lungs clear to auscultation	  Gastrointestinal:  Soft, Non-tender, + BS	  Extremities: Normal range of motion, No clubbing, cyanosis or edema  Vascular: Peripheral pulses palpable 2+ bilaterally       Home Medications:  Advair Diskus 250 mcg-50 mcg inhalation powder: 1 puff(s) inhaled 2 times a day (10 Trevin 2021 18:22)  atorvastatin 20 mg oral tablet: 1 tab(s) orally once a day (at bedtime) (10 Trevin 2021 18:22)  metoprolol succinate 50 mg oral tablet, extended release: 1 tab(s) orally once a day (10 Trevin 2021 18:22)  omeprazole 40 mg oral delayed release capsule: 1 cap(s) orally once a day (10 Trevin 2021 18:22)        MEDICATIONS  (STANDING):  aspirin enteric coated 81 milliGRAM(s) Oral daily        EKG:  RADIOLOGY:  DIAGNOSTIC TESTING:  [ ] Echocardiogram:  [ ] Catherterization:  [ ] Stress Test:  OTHER:     LABS:	 	                          14.0   7.43  )-----------( 269      ( 14 Jul 2024 05:05 )             40.1     07-14    141  |  102  |  16  ----------------------------<  86  3.8   |  23  |  0.86    Ca    9.4      14 Jul 2024 05:05  Phos  3.3     07-14  Mg     2.20     07-14    TPro  6.6  /  Alb  3.6  /  TBili  0.5  /  DBili  x   /  AST  24  /  ALT  18  /  AlkPhos  98  07-13          CARDIAC MARKERS:

## 2024-07-17 NOTE — DISCHARGE NOTE NURSING/CASE MANAGEMENT/SOCIAL WORK - NSDCFUADDAPPT_GEN_ALL_CORE_FT
Follow up with your Primary Care Doctor and Cardiologist within one week of discharge. If you do not have one, you can call the medicine clinic at [phone] to make an appointment.

## 2024-07-17 NOTE — DISCHARGE NOTE PROVIDER - HOSPITAL COURSE
79 yo (A&Ox3 baseline) female, PMH HTN, HLD, COPD, presenting to the ED after episode of chest pain, found to have equivocal CTA coronaries and subsequently admitted for LHC.  LHC on 7/16 via RRA showed luminal disease, no intervention.  Chest pain resolved.     Patient seen and evaluated. Reviewed discharge medications with patient and attending. All new medications requiring new prescriptions were sent to the pharmacy of patient's choice. Reviewed need for prescription for previous home medications and new prescriptions sent if requested. Medically cleared/stable for discharge as per   with appropriate follow up. Patient understands and agrees with plan of care.

## 2024-10-28 NOTE — PRE-OP CHECKLIST - RESPIRATORY RATE (BREATHS/MIN)
Medical Week 1 Survey      Flowsheet Row Responses   [facility] patient discharged from? [facility]   Does the patient have one of the following disease processes/diagnoses(primary or secondary)? Other   Week 1 attempt successful? Yes   Call start time 1800   Call end time 1804   Discharge diagnosis Hyponatremia, heart cath with PCI   Person spoke with today (if not patient) and relationship granddaughter   Does the patient have a primary care provider?  Yes   Does the patient have an appointment with their PCP within 7 days of discharge? Yes   Has the patient kept scheduled appointments due by today? N/A   Comments Has appt with PCP tomorrow.   What is the Home health agency?  current with [facility]   Has home health visited the patient within 72 hours of discharge? Yes   Home health comments PT did assessment today   Psychosocial issues? No   Did the patient receive a copy of their discharge instructions? Yes   Nursing interventions Reviewed instructions with patient   What is the patient's perception of their health status since discharge? Improving   Is the patient/caregiver able to teach back signs and symptoms related to disease process for when to call PCP? Yes   Is the patient/caregiver able to teach back signs and symptoms related to disease process for when to call 911? Yes   Is the patient/caregiver able to teach back the hierarchy of who to call/visit for symptoms/problems? PCP, Specialist, Home health nurse, Urgent Care, ED, 911 Yes   If the patient is a current smoker, are they able to teach back resources for cessation? Not a smoker   Additional teach back comments States she is doing well.  She just isn't sleeping as well as she normally does.   Week 1 call completed? Yes   Wrap up additional comments No questions or needs at this time.   Call end time 1804            Azeb MACHADO - Licensed Nurse  
18

## 2024-11-02 ENCOUNTER — EMERGENCY (EMERGENCY)
Facility: HOSPITAL | Age: 79
LOS: 1 days | Discharge: HOME | End: 2024-11-02
Attending: EMERGENCY MEDICINE
Payer: MEDICARE

## 2024-11-02 VITALS
RESPIRATION RATE: 17 BRPM | HEART RATE: 80 BPM | OXYGEN SATURATION: 98 % | HEIGHT: 60 IN | WEIGHT: 147.05 LBS | TEMPERATURE: 98 F | SYSTOLIC BLOOD PRESSURE: 166 MMHG | DIASTOLIC BLOOD PRESSURE: 76 MMHG

## 2024-11-02 VITALS
RESPIRATION RATE: 20 BRPM | HEART RATE: 76 BPM | SYSTOLIC BLOOD PRESSURE: 156 MMHG | DIASTOLIC BLOOD PRESSURE: 84 MMHG | OXYGEN SATURATION: 99 % | TEMPERATURE: 98 F

## 2024-11-02 LAB
ALBUMIN SERPL ELPH-MCNC: 3.2 G/DL — LOW (ref 3.5–5)
ALP SERPL-CCNC: 94 U/L — SIGNIFICANT CHANGE UP (ref 40–120)
ALT FLD-CCNC: 31 U/L DA — SIGNIFICANT CHANGE UP (ref 10–60)
ANION GAP SERPL CALC-SCNC: 5 MMOL/L — SIGNIFICANT CHANGE UP (ref 5–17)
AST SERPL-CCNC: 25 U/L — SIGNIFICANT CHANGE UP (ref 10–40)
BASOPHILS # BLD AUTO: 0.05 K/UL — SIGNIFICANT CHANGE UP (ref 0–0.2)
BASOPHILS NFR BLD AUTO: 0.7 % — SIGNIFICANT CHANGE UP (ref 0–2)
BILIRUB SERPL-MCNC: 0.4 MG/DL — SIGNIFICANT CHANGE UP (ref 0.2–1.2)
BUN SERPL-MCNC: 27 MG/DL — HIGH (ref 7–18)
CALCIUM SERPL-MCNC: 9 MG/DL — SIGNIFICANT CHANGE UP (ref 8.4–10.5)
CHLORIDE SERPL-SCNC: 107 MMOL/L — SIGNIFICANT CHANGE UP (ref 96–108)
CO2 SERPL-SCNC: 27 MMOL/L — SIGNIFICANT CHANGE UP (ref 22–31)
CREAT SERPL-MCNC: 0.99 MG/DL — SIGNIFICANT CHANGE UP (ref 0.5–1.3)
EGFR: 57 ML/MIN/1.73M2 — LOW
EOSINOPHIL # BLD AUTO: 0.21 K/UL — SIGNIFICANT CHANGE UP (ref 0–0.5)
EOSINOPHIL NFR BLD AUTO: 3 % — SIGNIFICANT CHANGE UP (ref 0–6)
GLUCOSE SERPL-MCNC: 110 MG/DL — HIGH (ref 70–99)
HCT VFR BLD CALC: 38.6 % — SIGNIFICANT CHANGE UP (ref 34.5–45)
HGB BLD-MCNC: 13.5 G/DL — SIGNIFICANT CHANGE UP (ref 11.5–15.5)
IMM GRANULOCYTES NFR BLD AUTO: 0.3 % — SIGNIFICANT CHANGE UP (ref 0–0.9)
LIDOCAIN IGE QN: 59 U/L — SIGNIFICANT CHANGE UP (ref 13–75)
LYMPHOCYTES # BLD AUTO: 2.42 K/UL — SIGNIFICANT CHANGE UP (ref 1–3.3)
LYMPHOCYTES # BLD AUTO: 34.4 % — SIGNIFICANT CHANGE UP (ref 13–44)
MAGNESIUM SERPL-MCNC: 2.3 MG/DL — SIGNIFICANT CHANGE UP (ref 1.6–2.6)
MCHC RBC-ENTMCNC: 32 PG — SIGNIFICANT CHANGE UP (ref 27–34)
MCHC RBC-ENTMCNC: 35 G/DL — SIGNIFICANT CHANGE UP (ref 32–36)
MCV RBC AUTO: 91.5 FL — SIGNIFICANT CHANGE UP (ref 80–100)
MONOCYTES # BLD AUTO: 0.56 K/UL — SIGNIFICANT CHANGE UP (ref 0–0.9)
MONOCYTES NFR BLD AUTO: 8 % — SIGNIFICANT CHANGE UP (ref 2–14)
NEUTROPHILS # BLD AUTO: 3.77 K/UL — SIGNIFICANT CHANGE UP (ref 1.8–7.4)
NEUTROPHILS NFR BLD AUTO: 53.6 % — SIGNIFICANT CHANGE UP (ref 43–77)
NRBC # BLD: 0 /100 WBCS — SIGNIFICANT CHANGE UP (ref 0–0)
PLATELET # BLD AUTO: 214 K/UL — SIGNIFICANT CHANGE UP (ref 150–400)
POTASSIUM SERPL-MCNC: 3.9 MMOL/L — SIGNIFICANT CHANGE UP (ref 3.5–5.3)
POTASSIUM SERPL-SCNC: 3.9 MMOL/L — SIGNIFICANT CHANGE UP (ref 3.5–5.3)
PROT SERPL-MCNC: 6.5 G/DL — SIGNIFICANT CHANGE UP (ref 6–8.3)
RBC # BLD: 4.22 M/UL — SIGNIFICANT CHANGE UP (ref 3.8–5.2)
RBC # FLD: 12.4 % — SIGNIFICANT CHANGE UP (ref 10.3–14.5)
SODIUM SERPL-SCNC: 139 MMOL/L — SIGNIFICANT CHANGE UP (ref 135–145)
TROPONIN I, HIGH SENSITIVITY RESULT: 6.3 NG/L — SIGNIFICANT CHANGE UP
WBC # BLD: 7.03 K/UL — SIGNIFICANT CHANGE UP (ref 3.8–10.5)
WBC # FLD AUTO: 7.03 K/UL — SIGNIFICANT CHANGE UP (ref 3.8–10.5)

## 2024-11-02 PROCEDURE — 93005 ELECTROCARDIOGRAM TRACING: CPT

## 2024-11-02 PROCEDURE — 83690 ASSAY OF LIPASE: CPT

## 2024-11-02 PROCEDURE — 36415 COLL VENOUS BLD VENIPUNCTURE: CPT

## 2024-11-02 PROCEDURE — 71046 X-RAY EXAM CHEST 2 VIEWS: CPT | Mod: 26

## 2024-11-02 PROCEDURE — 85025 COMPLETE CBC W/AUTO DIFF WBC: CPT

## 2024-11-02 PROCEDURE — 71046 X-RAY EXAM CHEST 2 VIEWS: CPT

## 2024-11-02 PROCEDURE — 99285 EMERGENCY DEPT VISIT HI MDM: CPT | Mod: 25

## 2024-11-02 PROCEDURE — 73562 X-RAY EXAM OF KNEE 3: CPT

## 2024-11-02 PROCEDURE — 83735 ASSAY OF MAGNESIUM: CPT

## 2024-11-02 PROCEDURE — 84484 ASSAY OF TROPONIN QUANT: CPT

## 2024-11-02 PROCEDURE — 99285 EMERGENCY DEPT VISIT HI MDM: CPT

## 2024-11-02 PROCEDURE — 73562 X-RAY EXAM OF KNEE 3: CPT | Mod: 26,LT

## 2024-11-02 PROCEDURE — 80053 COMPREHEN METABOLIC PANEL: CPT

## 2024-11-02 RX ORDER — ACETAMINOPHEN 500 MG
650 TABLET ORAL ONCE
Refills: 0 | Status: COMPLETED | OUTPATIENT
Start: 2024-11-02 | End: 2024-11-02

## 2024-11-02 RX ADMIN — Medication 650 MILLIGRAM(S): at 15:42

## 2024-11-02 NOTE — ED PROVIDER NOTE - PHYSICAL EXAMINATION
General: Patient well appearing, vital signs within normal limits  HEENT: MMM, trachea midline  Cardiac: RRR S1/S2 with strong peripheral pulses  Respiratory: No respiratory distress, CTAB   GI: Soft, non tender, non distended  Neuro: Moves all extremities, no focal neurological deficits  Skin: No rashes or lesions, warm, dry

## 2024-11-02 NOTE — ED ADULT NURSE NOTE - OBJECTIVE STATEMENT
Patient is c/o L sided chest pain since am ,received 324 mg of oral aspirin. Patient denies N/V/D, No SOB. Patient is placed on cardiac monitor.

## 2024-11-02 NOTE — ED PROVIDER NOTE - NSFOLLOWUPINSTRUCTIONS_ED_ALL_ED_FT
You were evaluated for chest pain.  Your EKG, chest x-ray and laboratories including cardiac markers were negative.  Please follow-up with your primary care physician.  X-ray of the left knee showed no acute fractures but only degenerative disease.      Chest Pain    Chest pain can be caused by many different conditions which may or may not be dangerous. Causes include heartburn, lung infections, heart attack, blood clot in lungs, skin infections, strain or damage to muscle, cartilage, or bones, etc. In addition to a history and physical examination, an electrocardiogram (ECG) or other lab tests may have been performed to determine the cause of your chest pain. Follow up with your primary care provider or with a cardiologist as instructed.     SEEK IMMEDIATE MEDICAL CARE IF YOU HAVE ANY OF THE FOLLOWING SYMPTOMS: worsening chest pain, coughing up blood, unexplained back/neck/jaw pain, severe abdominal pain, dizziness or lightheadedness, fainting, shortness of breath, sweaty or clammy skin, vomiting, or racing heart beat. These symptoms may represent a serious problem that is an emergency. Do not wait to see if the symptoms will go away. Get medical help right away. Call 911 and do not drive yourself to the hospital.

## 2024-11-02 NOTE — ED PROVIDER NOTE - OBJECTIVE STATEMENT
79-year-old  with no significant past medical history presents with chest pain following a meal last evening which was associated with nausea and coughing.  Upon my physical exam and  history taking, patient denies any chest pain or abdominal pain.  States that he has a mild headache.  He did received aspirin and route to the emergency department.  Patient is also complaining of constipation.  Last bowel movement was this morning but was hard.   Denies any fevers. 79-year-old female with past history of COPD, hypertension, hyperlipidemia who presents with left-sided chest pain after taking a pill this morning at 0900.  Described as sharp.  Denies any cough, fever, shortness of breath, vomiting. Received aspirin by EMS.  States it is much improved  since onset.

## 2024-11-02 NOTE — ED PROVIDER NOTE - CLINICAL SUMMARY MEDICAL DECISION MAKING FREE TEXT BOX
79-year-old female with past medical history of hypertension, hyperlipidemia presents with left-sided chest pain that began this morning.  Received aspirin by EMS.  EKG shows no ischemic changes.  Troponin negative x 1.  Chest x-ray shows no focal consolidation.  Patient feels improved on reexamination.  She had asked for x-ray of her left knee due to atraumatic pain over the last several days to weeks.  X-ray shows no acute fractures, degenerative disease.  Patient will follow-up with primary care physician.  Heart score 3.

## 2024-11-02 NOTE — ED PROVIDER NOTE - PATIENT PORTAL LINK FT
You can access the FollowMyHealth Patient Portal offered by [facility] by registering at the following website: [email or web address]. By joining [employer]’s FollowMyHealth portal, you will also be able to view your health information using other applications (apps) compatible with our system.

## 2024-11-04 PROBLEM — J44.9 CHRONIC OBSTRUCTIVE PULMONARY DISEASE, UNSPECIFIED: Chronic | Status: ACTIVE | Noted: 2024-07-13

## 2024-11-08 ENCOUNTER — EMERGENCY (EMERGENCY)
Facility: HOSPITAL | Age: 79
LOS: 1 days | Discharge: HOME | End: 2024-11-08
Attending: STUDENT IN AN ORGANIZED HEALTH CARE EDUCATION/TRAINING PROGRAM
Payer: MEDICARE

## 2024-11-08 VITALS
SYSTOLIC BLOOD PRESSURE: 138 MMHG | TEMPERATURE: 98 F | WEIGHT: 141.1 LBS | HEIGHT: 60 IN | DIASTOLIC BLOOD PRESSURE: 71 MMHG | HEART RATE: 76 BPM | RESPIRATION RATE: 17 BRPM | OXYGEN SATURATION: 98 %

## 2024-11-08 VITALS
TEMPERATURE: 98 F | DIASTOLIC BLOOD PRESSURE: 72 MMHG | OXYGEN SATURATION: 98 % | SYSTOLIC BLOOD PRESSURE: 126 MMHG | RESPIRATION RATE: 16 BRPM | HEART RATE: 86 BPM

## 2024-11-08 LAB
APPEARANCE UR: CLEAR — SIGNIFICANT CHANGE UP
BACTERIA # UR AUTO: NEGATIVE /HPF — SIGNIFICANT CHANGE UP
BILIRUB UR-MCNC: NEGATIVE — SIGNIFICANT CHANGE UP
COLOR SPEC: YELLOW — SIGNIFICANT CHANGE UP
COMMENT - URINE: SIGNIFICANT CHANGE UP
DIFF PNL FLD: NEGATIVE — SIGNIFICANT CHANGE UP
EPI CELLS # UR: PRESENT
GLUCOSE UR QL: NEGATIVE MG/DL — SIGNIFICANT CHANGE UP
KETONES UR-MCNC: NEGATIVE MG/DL — SIGNIFICANT CHANGE UP
LEUKOCYTE ESTERASE UR-ACNC: ABNORMAL
NITRITE UR-MCNC: NEGATIVE — SIGNIFICANT CHANGE UP
PH UR: 6.5 — SIGNIFICANT CHANGE UP (ref 5–8)
PROT UR-MCNC: NEGATIVE MG/DL — SIGNIFICANT CHANGE UP
RBC CASTS # UR COMP ASSIST: 0 /HPF — SIGNIFICANT CHANGE UP (ref 0–4)
SP GR SPEC: 1.01 — SIGNIFICANT CHANGE UP (ref 1–1.03)
UROBILINOGEN FLD QL: 0.2 MG/DL — SIGNIFICANT CHANGE UP (ref 0.2–1)
WBC UR QL: 1 /HPF — SIGNIFICANT CHANGE UP (ref 0–5)

## 2024-11-08 PROCEDURE — 72125 CT NECK SPINE W/O DYE: CPT | Mod: MC

## 2024-11-08 PROCEDURE — 70450 CT HEAD/BRAIN W/O DYE: CPT | Mod: 26,MC

## 2024-11-08 PROCEDURE — 99284 EMERGENCY DEPT VISIT MOD MDM: CPT | Mod: 25

## 2024-11-08 PROCEDURE — 99284 EMERGENCY DEPT VISIT MOD MDM: CPT

## 2024-11-08 PROCEDURE — 81001 URINALYSIS AUTO W/SCOPE: CPT

## 2024-11-08 PROCEDURE — 72125 CT NECK SPINE W/O DYE: CPT | Mod: 26,MC

## 2024-11-08 PROCEDURE — 70450 CT HEAD/BRAIN W/O DYE: CPT | Mod: MC

## 2024-11-08 RX ORDER — ACETAMINOPHEN 500 MG
650 TABLET ORAL ONCE
Refills: 0 | Status: COMPLETED | OUTPATIENT
Start: 2024-11-08 | End: 2024-11-08

## 2024-11-08 RX ADMIN — Medication 650 MILLIGRAM(S): at 14:54

## 2024-11-08 NOTE — ED ADULT NURSE NOTE - OBJECTIVE STATEMENT
Pt presents to ED s/p fall at home last night. Patient states that she was in bed when she slipped and fell out of bed, and hit her head on metal object. Denies any blood thinners, dizziness, CP, SOB, N/V

## 2024-11-08 NOTE — ED PROVIDER NOTE - PATIENT PORTAL LINK FT
You can access the FollowMyHealth Patient Portal offered by [facility] by registering at the following website: [email or web address]. By joining [employer]’s FollowMyHealth portal, you will also be able to view your health information using other applications (apps) compatible with our system.

## 2024-11-08 NOTE — ED PROVIDER NOTE - PROGRESS NOTE DETAILS
Susan Mora, DO:  Feels better. No new complaints. Results are discussed. An opportunity to ask questions was provided and all questions answered. Discharge plan is discussed with the patient. Patient reports understanding to do the follow up with pcp. Return precautions discussed.  Tolerated PO

## 2024-11-08 NOTE — ED PROVIDER NOTE - NSFOLLOWUPINSTRUCTIONS_ED_ALL_ED_FT
You were seen for assessment after a fall.     No signs of emergency medical condition on today's workup.  Your results are attached with your discharge instructions, please review them with your primary care physician. If there is a result pending, you will receive a call if test is positive.    A presumptive diagnosis is made today, but further evaluation may be required by your primary care doctor and/or specialist for a definitive diagnosis. Therefore, follow up as directed and if symptoms change/worsen or any emergency conditions, please return to the ER.    If needed, call patient access services at [phone] to find a primary care doctor, or call at [phone] to make an appointment at the clinic.

## 2024-11-08 NOTE — ED ADULT NURSE NOTE - NSFALLRISKINTERV_ED_ALL_ED

## 2024-11-08 NOTE — ED PROVIDER NOTE - CLINICAL SUMMARY MEDICAL DECISION MAKING FREE TEXT BOX
79-year-old female with past medical history of hypertension, hyperlipidemia, COPD, knee arthritis coming in after she tripped and fell walking over to turn the room light off last night.  States he landed on her buttocks and hit her head on the floor.  Having mild headache.  No change in strength or sensation in the extremities.  Not on any blood thinners.  No change in gait.  Denies chest pain, difficulty breathing, back pain, abdominal pain, nausea, vomiting.  Patient is nontoxic-appearing.  No distress.  ANO x 4.  Cranial nerves III to XII grossly intact.  Equal strength and sensation in all extremities.  Smooth gait.  No TTP of the knee.  No overlying swelling of the knee.  Mild contusion noted in mid parietal/occipital region.  No laceration appreciated.  No cervical/thoracic/lumbar spinal or paraspinal tenderness to palpation.  Mild ecchymosis of the right elbow with full range of motion.  This is a mechanical fall–neuro intact/not on any thinners -less likely to have head bleed.  Differential diagnoses include but not limited to head bleed given the age, UTI.

## 2024-12-16 NOTE — DISCHARGE NOTE PROVIDER - EXTENDED VTE YES NO FOR MLM ENOXAPARIN
12/16/24 1000   Daily Treatment   Affect Appropriate   Mood Anxious   Thought Process Appropriate   Psychosis None   Sleep Report Difficulty falling asleep;Frequently awakening   Treatment Plan Continue treatment plan   Patient Response Appropriate feedback   RN Monitoring of Pain, Safety, and Relapse   Safety Concerns None   Physical Concerns none   Pain Concerns none   Use of Street Drugs or Alcohol Yes   Taking Medications as Prescribed Yes   Patient Response Appropriate feedback   Nursing Comments Patient discharging today, AVS, safety plan, and school excuse provided today.       
[facility]/Adolescent [facility] Daily Treatment Note     Number of patients in group today: 6     From 9546-2015, Pt participated in an introductory group and breakfast. Pt was able to introduce themselves to the group, was attentive to review of group expectations, and interacted appropriately with peers and writer. Pt participated in the group game and practicing social skills.     From 7869-7181, Pt participated in process group, presented with euthymic mood and affect, rated mood 10/10. Pt shared about feeling good about discharge today, expressed being glad to go back to school though some concerns with make up work but expressed willingness to meet with school counselor to make a plan to get caught up. Pt shared that he did feel he made good progress when here in the program, pt shared that he does feel his mood is much better than when he started and feeling more confident about quitting and notices that he does not feel an intense \"need\" to smoke all the time now. We discussed using IOP as a way to continue working toward this and getting valuable to feedback and support, which pt did feel was useful here in PHP. Pt was receptive to the feedback and affirmations from peers and writer.        From 9761-3537, Pt participated in a therapeutic activity on discussing coping skills lead by student intern Katherin HALLMAN, see notes. Writer was present and monitored the activity.    From 6830-5138, Pt participated in a goal setting group discussing short term goals and safety. Pt denied safety concerns before returning home today.      Liyah Siu, LPC  12/16/24  
,

## 2025-01-12 ENCOUNTER — EMERGENCY (EMERGENCY)
Facility: HOSPITAL | Age: 80
LOS: 1 days | Discharge: HOME | End: 2025-01-12
Attending: STUDENT IN AN ORGANIZED HEALTH CARE EDUCATION/TRAINING PROGRAM
Payer: MEDICARE

## 2025-01-12 VITALS
WEIGHT: 143.74 LBS | HEART RATE: 86 BPM | RESPIRATION RATE: 18 BRPM | SYSTOLIC BLOOD PRESSURE: 133 MMHG | OXYGEN SATURATION: 96 % | TEMPERATURE: 98 F | DIASTOLIC BLOOD PRESSURE: 70 MMHG

## 2025-01-12 LAB
ALBUMIN SERPL ELPH-MCNC: 3.8 G/DL — SIGNIFICANT CHANGE UP (ref 3.5–5)
ANION GAP SERPL CALC-SCNC: 4 MMOL/L — LOW (ref 5–17)
BASOPHILS # BLD AUTO: 0.04 K/UL — SIGNIFICANT CHANGE UP (ref 0–0.2)
BASOPHILS NFR BLD AUTO: 0.3 % — SIGNIFICANT CHANGE UP (ref 0–2)
BUN SERPL-MCNC: 25 MG/DL — HIGH (ref 7–18)
CALCIUM SERPL-MCNC: 9.3 MG/DL — SIGNIFICANT CHANGE UP (ref 8.4–10.5)
CHLORIDE SERPL-SCNC: 105 MMOL/L — SIGNIFICANT CHANGE UP (ref 96–108)
CO2 SERPL-SCNC: 32 MMOL/L — HIGH (ref 22–31)
EOSINOPHIL # BLD AUTO: 0.26 K/UL — SIGNIFICANT CHANGE UP (ref 0–0.5)
EOSINOPHIL NFR BLD AUTO: 1.8 % — SIGNIFICANT CHANGE UP (ref 0–6)
GLUCOSE SERPL-MCNC: 142 MG/DL — HIGH (ref 70–99)
HCT VFR BLD CALC: 42 % — SIGNIFICANT CHANGE UP (ref 34.5–45)
HGB BLD-MCNC: 14.4 G/DL — SIGNIFICANT CHANGE UP (ref 11.5–15.5)
IMM GRANULOCYTES NFR BLD AUTO: 0.5 % — SIGNIFICANT CHANGE UP (ref 0–0.9)
LIDOCAIN IGE QN: 81 U/L — HIGH (ref 13–75)
LYMPHOCYTES # BLD AUTO: 1 K/UL — SIGNIFICANT CHANGE UP (ref 1–3.3)
LYMPHOCYTES # BLD AUTO: 6.8 % — LOW (ref 13–44)
MCHC RBC-ENTMCNC: 31 PG — SIGNIFICANT CHANGE UP (ref 27–34)
MCHC RBC-ENTMCNC: 34.3 G/DL — SIGNIFICANT CHANGE UP (ref 32–36)
MCV RBC AUTO: 90.3 FL — SIGNIFICANT CHANGE UP (ref 80–100)
MONOCYTES # BLD AUTO: 0.71 K/UL — SIGNIFICANT CHANGE UP (ref 0–0.9)
MONOCYTES NFR BLD AUTO: 4.8 % — SIGNIFICANT CHANGE UP (ref 2–14)
NEUTROPHILS # BLD AUTO: 12.63 K/UL — HIGH (ref 1.8–7.4)
NEUTROPHILS NFR BLD AUTO: 85.8 % — HIGH (ref 43–77)
NRBC # BLD: 0 /100 WBCS — SIGNIFICANT CHANGE UP (ref 0–0)
PLATELET # BLD AUTO: 234 K/UL — SIGNIFICANT CHANGE UP (ref 150–400)
POTASSIUM SERPL-MCNC: 4.4 MMOL/L — SIGNIFICANT CHANGE UP (ref 3.5–5.3)
POTASSIUM SERPL-SCNC: 4.4 MMOL/L — SIGNIFICANT CHANGE UP (ref 3.5–5.3)
RBC # BLD: 4.65 M/UL — SIGNIFICANT CHANGE UP (ref 3.8–5.2)
RBC # FLD: 12.2 % — SIGNIFICANT CHANGE UP (ref 10.3–14.5)
SODIUM SERPL-SCNC: 141 MMOL/L — SIGNIFICANT CHANGE UP (ref 135–145)
WBC # BLD: 14.72 K/UL — HIGH (ref 3.8–10.5)
WBC # FLD AUTO: 14.72 K/UL — HIGH (ref 3.8–10.5)

## 2025-01-12 PROCEDURE — 76705 ECHO EXAM OF ABDOMEN: CPT | Mod: 26

## 2025-01-12 PROCEDURE — 99285 EMERGENCY DEPT VISIT HI MDM: CPT

## 2025-01-13 VITALS
RESPIRATION RATE: 18 BRPM | SYSTOLIC BLOOD PRESSURE: 107 MMHG | TEMPERATURE: 99 F | DIASTOLIC BLOOD PRESSURE: 67 MMHG | HEART RATE: 90 BPM | OXYGEN SATURATION: 95 %

## 2025-01-13 LAB
ALP SERPL-CCNC: 99 U/L — SIGNIFICANT CHANGE UP (ref 40–120)
ALT FLD-CCNC: 32 U/L DA — SIGNIFICANT CHANGE UP (ref 10–60)
AST SERPL-CCNC: 31 U/L — SIGNIFICANT CHANGE UP (ref 10–40)
BILIRUB SERPL-MCNC: 0.6 MG/DL — SIGNIFICANT CHANGE UP (ref 0.2–1.2)
CREAT SERPL-MCNC: 1.07 MG/DL — SIGNIFICANT CHANGE UP (ref 0.5–1.3)
EGFR: 52 ML/MIN/1.73M2 — LOW
FLUAV AG NPH QL: SIGNIFICANT CHANGE UP
FLUBV AG NPH QL: SIGNIFICANT CHANGE UP
PROT SERPL-MCNC: 7.2 G/DL — SIGNIFICANT CHANGE UP (ref 6–8.3)
RSV RNA NPH QL NAA+NON-PROBE: SIGNIFICANT CHANGE UP
SARS-COV-2 RNA SPEC QL NAA+PROBE: SIGNIFICANT CHANGE UP
TROPONIN I, HIGH SENSITIVITY RESULT: 7.1 NG/L — SIGNIFICANT CHANGE UP

## 2025-01-13 PROCEDURE — 80053 COMPREHEN METABOLIC PANEL: CPT

## 2025-01-13 PROCEDURE — 76705 ECHO EXAM OF ABDOMEN: CPT

## 2025-01-13 PROCEDURE — 99285 EMERGENCY DEPT VISIT HI MDM: CPT | Mod: 25

## 2025-01-13 PROCEDURE — 36415 COLL VENOUS BLD VENIPUNCTURE: CPT

## 2025-01-13 PROCEDURE — 87637 SARSCOV2&INF A&B&RSV AMP PRB: CPT

## 2025-01-13 PROCEDURE — 96374 THER/PROPH/DIAG INJ IV PUSH: CPT

## 2025-01-13 PROCEDURE — 83690 ASSAY OF LIPASE: CPT

## 2025-01-13 PROCEDURE — 85025 COMPLETE CBC W/AUTO DIFF WBC: CPT

## 2025-01-13 PROCEDURE — 84484 ASSAY OF TROPONIN QUANT: CPT

## 2025-01-13 PROCEDURE — 0241U: CPT

## 2025-01-13 RX ORDER — MAG HYDROX/ALUMINUM HYD/SIMETH 200-200-20
15 SUSPENSION, ORAL (FINAL DOSE FORM) ORAL ONCE
Refills: 0 | Status: COMPLETED | OUTPATIENT
Start: 2025-01-13 | End: 2025-01-13

## 2025-01-13 RX ORDER — ACETAMINOPHEN 80 MG/.8ML
1000 SOLUTION/ DROPS ORAL ONCE
Refills: 0 | Status: COMPLETED | OUTPATIENT
Start: 2025-01-13 | End: 2025-01-13

## 2025-01-13 RX ADMIN — ACETAMINOPHEN 400 MILLIGRAM(S): 80 SOLUTION/ DROPS ORAL at 01:29

## 2025-01-13 RX ADMIN — Medication 15 MILLILITER(S): at 01:29

## 2025-01-13 NOTE — ED PROVIDER NOTE - IV ALTEPLASE EXCL ABS HIDDEN
They saw romina yesterday, Maki Han was to call in #60 of her lorazepam to [facility]. Nida states they do not have this, can she resend? show

## 2025-01-13 NOTE — ED ADULT NURSE NOTE - NSFALLRISKINTERV_ED_ALL_ED

## 2025-01-13 NOTE — ED PROVIDER NOTE - NSFOLLOWUPINSTRUCTIONS_ED_ALL_ED_FT
You have been evaluated in the Emergency Department today for abdominal pain. Your evaluation did not show evidence of medical conditions requiring emergent intervention at this time.    Please schedule an appointment with your primary care physician.    Return to the Emergency Department if you experience worsening or uncontrolled pain, fevers 100.4°F or greater, recurrent vomiting, inability to tolerate food or fluids by mouth, bloody stools or vomit, black or tarry stools, or any other concerning symptoms.    Thank you for choosing us for your care.

## 2025-01-13 NOTE — ED PROVIDER NOTE - CLINICAL SUMMARY MEDICAL DECISION MAKING FREE TEXT BOX
The cause of the patient’s symptoms is not clear, but the patient is overall well appearing and is suspected to have a transient course of illness.  This patient with nausea and vomiting which is likely secondary to benign gastritis. Considered but low risk for SBO (normal BM, passing flatus, no abdominal surgeries), no signs of DKA in labs. Patient BMP with normal electrolytes and no sign of dehydration causing prerenal SAE. Low suspicion for gastric or esophageal dysmotility as cause_. Patient with no chest pain, unremarkable EKG so low suspicion for ACS. Based on history, exam, and work up low suspicion for pancreatitis, appendicitis, biliary pathology, or other emergent problem. Patient given zofran and tolerated PO here. Patient to be discharged with zofran and to follow up with PMD.    Given History and Exam there does not appear to be an emergent cause of the symptoms such as small bowel obstruction, coronary syndrome, bowel ischemia, DKA, pancreatitis, appendicitis, other acute abdomen or other emergent problem.    Reassessment: After treatment, the patient is feeling much better, tolerating PO fluids, and shows no signs of dehydration.     Disposition: Discharge home with prompt primary care physician follow up in the next 48 hours. Strict return precautions discussed.

## 2025-01-13 NOTE — ED PROVIDER NOTE - OBJECTIVE STATEMENT
79-year-old female presents from assisted living for episode of vomiting.  Patient reports abruptly vomiting after eating 1 time.  Denies fevers or diarrhea.    General: Elderly, frail appearing  HEENT: Loss of orbital fat. Thinning of eyebrows. Dry mucous membranes. Poor dentition.  Heart: RRR. Systolic murmur.   Lungs: Diminished lungs sounds, CTA b/l  Chest/Back: Non-tender chest wall. No CVAT. Kyphosis.  Abdomen: Soft, non-tender, non-distended.  Neuro: No focal deficits. Bilateral Presbycusis.  Extremities: Decreased ROM in hips/knee/shoulders. Pulses intact x 4.  Skin: Thin. Dry. Normal color. No rashes. Capillary refill intact.  Psych: Calm, cooperative.

## 2025-01-13 NOTE — ED ADULT NURSE NOTE - PAIN: BODY LOCATION
Quality 137: Melanoma: Continuity Of Care - Recall System: Patient information entered into a recall system that includes: target date for the next exam specified AND a process to follow up with patients regarding missed or unscheduled appointments Detail Level: Detailed pako

## 2025-01-13 NOTE — ED PROVIDER NOTE - PATIENT PORTAL LINK FT
You can access the FollowMyHealth Patient Portal offered by [facility] by registering at the following website: [email or web address]. By joining [employer]’s FollowMyHealth portal, you will also be able to view your health information using other applications (apps) compatible with our system.

## 2025-01-13 NOTE — ED PROVIDER NOTE - PROGRESS NOTE DETAILS
Patient re-evaluated. Ambulatory in ED.  Appears significantly improved from initial presentation.    Stable vitals. Repeat HEENT exam benign. Repeat cardiovascular examination shows NRRR, equal pulses in all 4 extremities. Repeat chest exam shows no tenderness to the chest wall, no signs of traumatic injury. Repeat pulmonary examination shows equal bilateral lung sounds. Repeat neuro exam is benign without any neuro deficits in all 4 extremities. The patient was able to eat, drink, and ambulate safely in the ED. stable for discharge with close follow-up and strict return precautions. Discussed the indications and side-effects of applicable medications.  Informed patient of all diagnostic test results including imaging. The patient has been informed of all concerning signs and symptoms to return to Emergency Department, the necessity to follow up with PMD within 2-3 days, or to return to the ED was explained if unable to follow-up appropriately, and the patient reports understanding of above with capacity and insight.

## 2025-01-13 NOTE — ED ADULT NURSE NOTE - NS ED PATIENT SAFETY CONCERN
FAMILY MEDICINE TEACHING SERVICE  24/7 MD PAGER      [place] INPATIENT ENCOUNTER  DAILY PROGRESS NOTE    Date:  3/8/2021, Hospital Day: 4  Attending Physician:  Jasvir Sidhu MD     SUBJECTIVE     HISTORY OF PRESENT ILLNESS:       Adelaide Brink is a 68 year old female patient with a past medical history significant for HTN,  DM and dyslipidemia admitted for cholecystitis with concern for gallstone pancreatitis.     Interval History: Received Zofran+Compazine last night for nausea with complete resolution. Denies nausea/vomiting this am. Also pain free this morning. Last PO intake 10pm last night.   VSS with some mild range HTN. Labs with increased lipase and Alk Phos, which continue to trend down since admission.      HISTORIES:     I have reviewed the past medical history, family history, social history, medications, and allergies listed in the medical record as obtained by nursing and support staff and agree with their documentation.    OBJECTIVE     VITAL SIGNS:    BP (!) 163/76 (BP Location: LFA - Left forearm, Patient Position: Semi-Melgar's)   Pulse (!) 57   Temp 98.2 °F (36.8 °C) (Oral)   Resp 18   Ht 4' 11\" (1.499 m)   Wt 104.4 kg   BMI 46.47 kg/m²   BSA 1.96 m²     Intake/Output       03/07 0700 - 03/08 0659 03/08 0700 - 03/09 0659    P.O.      I.V.      IV Piggyback      Total Intake      Net            Urine Occurrence 2 x     Stool Occurrence 1 x         Admission weight: 103.9 kg, Weight change: 0.181 kg     PHYSICAL EXAM:    GENERAL:  Alert, cooperative, no distress, appears stated age.  HEENT:  Sclerae white, conjunctivae clear, no oropharyngeal erythema or lesions. Moist mucous membranes.  NECK:  Supple, symmetrical, trachea midline.  CARDIO:  Regular rate and rhythm.  Normal S1, S2.  No murmurs, gallops, or rubs.  Strong pulses in all extremities.  LUNGS:  Clear to auscultation bilaterally, respirations unlabored.   ABDOMEN:  Soft, nontender to  palpation.  Normal bowel sounds.   No rebound tenderness or guarding.  EXTREMITIES:  Range of motion within normal limits.  Warm and well perfused.  No clubbing or cyanosis. No peripheral edema noted.    SKIN:  Skin color, texture, turgor normal.   NEUROLOGIC:  Cranial nerves 2-12 are grossly intact. Sensation grossly intact throughout bilateral upper and lower extremities.    PSYCH:  Normal mood and affect.  Thought process and judgment appropriate.     LABORATORY DATA:    Recent Labs   Lab 03/08/21  0513 03/07/21  0448   SODIUM 143 140   POTASSIUM 4.3 4.3   CHLORIDE 112* 110*   CO2 25 26   BUN 17 19   CREATININE 1.67* 1.60*   GLUCOSE 91 101*   BILIRUBIN 0.4 0.4   AST 11 8   GPT 30 35     Recent Labs   Lab 03/08/21  0512 03/07/21  0448   WBC 8.0 9.5   HGB 10.7* 10.6*   HCT 33.4* 33.7*    264     Recent Labs   Lab 03/07/21  1023 03/07/21  1151 03/07/21  1835 03/07/21  2123 03/08/21  0603   GLUCOSE BEDSIDE 63* 108* 185* 128* 86     Recent Labs   Lab 03/08/21  0513 03/07/21  0448   ALBUMIN 2.3* 2.2*   CALCIUM 8.8 8.6       MICROBIOLOGY:  Blood culture: no growth for 2 days.     IMAGING STUDIES:      US Liver / Gallbladder / Pancreas   Final Result   IMPRESSION:      Cholelithiasis and mild pericholecystic fluid. Mild gallbladder wall   thickening. Negative sonographic Earl's sign. The possibility of acute   cholecystitis should be considered. Correlation with a nuclear medicine   hepatobiliary scan could be considered for further evaluation.      Findings suggestive of hepatic steatosis.      CT ABDOMEN PELVIS W CONTRAST   Final Result   IMPRESSION:      1.  Moderate gallbladder wall thickening and surrounding inflammatory   changes, suggestive of acute cholecystitis. Suspect subtle layering   gallstones. No significant biliary dilatation. Consider MRCP to evaluate   for choledocholithiasis clinically warranted.   2.  Mild inflammatory changes surrounding the pancreas, suggestive of acute   pancreatitis. No evidence of pancreatic necrosis or  peripancreatic fluid   collections.   3.  Uncomplicated diverticulosis.   4.  Status post appendectomy and hysterectomy.      I have personally reviewed the images and modified the resident's report as   necessary.          ASSESSMENT & PLAN     Active Problems:    Hyperlipidemia    CKD (chronic kidney disease) stage 3, GFR 30-59 ml/min (CMS/[facility])    Type 2 diabetes mellitus with mild nonproliferative diabetic retinopathy without macular edema (CMS/[facility])    Essential hypertension with goal blood pressure less than 140/90    Gallstone pancreatitis    Cholecystitis  Resolved Problems:    * No resolved hospital problems. *    Gall stone Pancreatitis   Acute cholecystitis  Pt presented with abdominal pain radiating to the back with n/v. Elevated WBC, lipase and alk phos on labs. CT A/P showing acute cholecystitis with suspect subtle layering gallstones and acute pancreatitis.  Liver U/S Cholelithiasis and mild pericholecystic fluid with GB wall thickening, negative braulio sign.  sugestive of acute cholecystitis.    -GI consult: Dr. Andujar)             -No MRCP or ERCP at this time 2/2  Normal tbili  -Surgery cons(Dr. Chase)    -Lap laci this am, NPO since midnight.   - continue IVF @125mL/hr  -Zosyn 3.375 q6h for cholycystitis      -Pain control: tylenol, norco for moderate pain and morphine for severe pain  -zofran IV, compazine prn for nausea     DM:   BS on admission: 258 A1C 11/16/2020 8.2  PTA glargine 130 units nightly (though pt reports only using 100-110 units as she's not eating much lately). BS at home usually .  Pt fasting sugar 86 this am without insulin last night. Continue high algorithm sliding scale. Will re-evaluate insulin needs s/p surgery when patient is no longer NPO.  -if pt remains uncontrolled, will consider insulin gtt     HTN:   Hypertensive in ED. Does not check blood pressures at home.  PTA Amlodipine 10mg qd and Losartan 50mg. Will hold Chlorthalidone 25mg qd as pt only takes as  needed.       CKD stage 3b (baseline creatinine: 1.5-1.7)  BUN: 18, creatinine 1.40 GFR: 38  -avoid nephrotoxic medications     Dyslipidemia  Last lipid panel 20 T LDL78 hdl 40 chol 177  PTA crestor 5; has not been taking since January   -atorvastatin   -consider switching to tricor at discharge.     FEN/GI:    IVF:   • [MAR Hold] lactated ringers infusion 125 mL/hr at 21 0830     Electrolytes: replacement per protocol, N/A for dialysis patients  Diet:   2 Times/day W Lunch & Dinner; Prosource Gelatein 20/gelatin, High Protein, Sugar Free, Orange Oral Nutrition Supplement  Npo Diet With Exceptions; Medications.      DVT/VTE Prophylaxis:  VTE Pharmacologic Prophylaxis: SQHeparin  VTE Mechanical Prophylaxis:  SCDs & TEDs    CODE STATUS Full Resuscitation    Disposition:  Anticipate discharge to Home Self Care    This patient was discussed with Jasvir Sidhu MD.    Connie Beckman MD  Family Medicine Teaching Service  Please page  for questions or concerns.     REFERENCES:  FMTS One Note  [employer]  [facility]  :[id number]}      No

## 2025-02-04 NOTE — PATIENT PROFILE ADULT - ARRIVAL FROM
[FreeTextEntry1] : CHARLES CERVANTES is a 69 year old female, seen on today for  OA   -> no therapy needed at this time.    + CCP and + SCL 70   -> no stigmata of scl (no skin disease, no gerd, normal RAP, est. pulm art systolic pressure 24 , no ild on ct chest in 4-2024)  -> no s/t joints today   -> ACR handout given to patient today on HCQ  -> can consider HCQ (although stopRA did not show preventive benefit, overall may be helpful to prevent auto-antibody devemopment)  -> recheck labs to confirm   Vitmain D excess  -> stop supplement and check again in 2 weeks.   LLE ulcer   -> needs vascular follow up     Today's medical care services serve as the continuing focal point for needed health care services that are part of ongoing care related to a patient's one or more serious conditions or a complex condition.   Time spent on the encounter included but is not limited to, preparing to see the patient, obtaining and/or reviewing separately obtained history, performing the evaluation, counseling and educating, independently interpreting results with communication to the patient, order placement, referring and/or communicating with other health professionals as described, and documenting clinical information in the electronic health record.   CHARLES CERVANTES was counseled on the differential, workup, disease course, and treatment/management.   Education was provided to CHARLES CERVANTES during this encounter. All questions and concerns were answered and addressed in detail.  CHARLES CERVANTES verbalized understanding and agreed to the plan.   CHARLES CERVANTES has been instructed to call for an earlier appointment if new symptoms develop in the interim. CHARLES CERVANTES has been instructed to make a follow-up appointment in 6 months     Home

## 2025-02-12 NOTE — ED ADULT NURSE NOTE - CAS DISCH TRANSFER METHOD
Returned call to mom.  Stating patient had a suspected allergic reaction to pineapples/papaya blend.  ER visit resulted in pt receiving an EPI pen.  Pt has to have an order for school purposes.  Request will be submitted to PCP per mom's request.  F/u appointment scheduled.       ----- Message from [employer] sent at 2/10/2025  1:16 PM CST -----  Contact: mother  ..Type:  Needs Medical Advice    Who Called: ..Mary Hess  Would the patient rather a call back or a response via [facility]? Call back   Best Call Back Number: .[phone]   Additional Information: pt mother is asking for an return call in reference to the school is needing State of [place] medication form filled out in order for nurse to administer  her Epi pen medication when needed please fax to [phone]   Transportation service

## 2025-02-15 ENCOUNTER — INPATIENT (INPATIENT)
Facility: HOSPITAL | Age: 80
LOS: 1 days | Discharge: NURSING, REHAB OR LONG-TERM CARE | DRG: 313 | End: 2025-02-17
Attending: INTERNAL MEDICINE | Admitting: INTERNAL MEDICINE
Payer: MEDICARE

## 2025-02-15 VITALS
RESPIRATION RATE: 22 BRPM | OXYGEN SATURATION: 98 % | HEART RATE: 65 BPM | DIASTOLIC BLOOD PRESSURE: 76 MMHG | SYSTOLIC BLOOD PRESSURE: 126 MMHG | TEMPERATURE: 98 F | WEIGHT: 139.99 LBS

## 2025-02-15 DIAGNOSIS — I25.10 ATHEROSCLEROTIC HEART DISEASE OF NATIVE CORONARY ARTERY WITHOUT ANGINA PECTORIS: ICD-10-CM

## 2025-02-15 DIAGNOSIS — I10 ESSENTIAL (PRIMARY) HYPERTENSION: ICD-10-CM

## 2025-02-15 DIAGNOSIS — I24.9 ACUTE ISCHEMIC HEART DISEASE, UNSPECIFIED: ICD-10-CM

## 2025-02-15 DIAGNOSIS — R07.9 CHEST PAIN, UNSPECIFIED: ICD-10-CM

## 2025-02-15 DIAGNOSIS — E78.5 HYPERLIPIDEMIA, UNSPECIFIED: ICD-10-CM

## 2025-02-15 DIAGNOSIS — Z29.9 ENCOUNTER FOR PROPHYLACTIC MEASURES, UNSPECIFIED: ICD-10-CM

## 2025-02-15 LAB
ALBUMIN SERPL ELPH-MCNC: 3.4 G/DL — LOW (ref 3.5–5)
ALP SERPL-CCNC: 71 U/L — SIGNIFICANT CHANGE UP (ref 40–120)
ALT FLD-CCNC: 27 U/L DA — SIGNIFICANT CHANGE UP (ref 10–60)
ANION GAP SERPL CALC-SCNC: 4 MMOL/L — LOW (ref 5–17)
APTT BLD: 32 SEC — SIGNIFICANT CHANGE UP (ref 24.5–35.6)
AST SERPL-CCNC: 20 U/L — SIGNIFICANT CHANGE UP (ref 10–40)
BASOPHILS # BLD AUTO: 0.06 K/UL — SIGNIFICANT CHANGE UP (ref 0–0.2)
BASOPHILS NFR BLD AUTO: 0.8 % — SIGNIFICANT CHANGE UP (ref 0–2)
BILIRUB SERPL-MCNC: 0.6 MG/DL — SIGNIFICANT CHANGE UP (ref 0.2–1.2)
BUN SERPL-MCNC: 22 MG/DL — HIGH (ref 7–18)
CALCIUM SERPL-MCNC: 9 MG/DL — SIGNIFICANT CHANGE UP (ref 8.4–10.5)
CHLORIDE SERPL-SCNC: 109 MMOL/L — HIGH (ref 96–108)
CO2 SERPL-SCNC: 28 MMOL/L — SIGNIFICANT CHANGE UP (ref 22–31)
CREAT SERPL-MCNC: 0.82 MG/DL — SIGNIFICANT CHANGE UP (ref 0.5–1.3)
EGFR: 72 ML/MIN/1.73M2 — SIGNIFICANT CHANGE UP
EOSINOPHIL # BLD AUTO: 0.14 K/UL — SIGNIFICANT CHANGE UP (ref 0–0.5)
EOSINOPHIL NFR BLD AUTO: 1.8 % — SIGNIFICANT CHANGE UP (ref 0–6)
FLUAV AG NPH QL: SIGNIFICANT CHANGE UP
FLUBV AG NPH QL: SIGNIFICANT CHANGE UP
GLUCOSE SERPL-MCNC: 93 MG/DL — SIGNIFICANT CHANGE UP (ref 70–99)
HCT VFR BLD CALC: 40.3 % — SIGNIFICANT CHANGE UP (ref 34.5–45)
HGB BLD-MCNC: 13.7 G/DL — SIGNIFICANT CHANGE UP (ref 11.5–15.5)
IMM GRANULOCYTES NFR BLD AUTO: 0.3 % — SIGNIFICANT CHANGE UP (ref 0–0.9)
INR BLD: 0.88 RATIO — SIGNIFICANT CHANGE UP (ref 0.85–1.16)
LYMPHOCYTES # BLD AUTO: 2.4 K/UL — SIGNIFICANT CHANGE UP (ref 1–3.3)
LYMPHOCYTES # BLD AUTO: 31.5 % — SIGNIFICANT CHANGE UP (ref 13–44)
MCHC RBC-ENTMCNC: 31.1 PG — SIGNIFICANT CHANGE UP (ref 27–34)
MCHC RBC-ENTMCNC: 34 G/DL — SIGNIFICANT CHANGE UP (ref 32–36)
MCV RBC AUTO: 91.4 FL — SIGNIFICANT CHANGE UP (ref 80–100)
MONOCYTES # BLD AUTO: 0.74 K/UL — SIGNIFICANT CHANGE UP (ref 0–0.9)
MONOCYTES NFR BLD AUTO: 9.7 % — SIGNIFICANT CHANGE UP (ref 2–14)
NEUTROPHILS # BLD AUTO: 4.27 K/UL — SIGNIFICANT CHANGE UP (ref 1.8–7.4)
NEUTROPHILS NFR BLD AUTO: 55.9 % — SIGNIFICANT CHANGE UP (ref 43–77)
NRBC BLD AUTO-RTO: 0 /100 WBCS — SIGNIFICANT CHANGE UP (ref 0–0)
PLATELET # BLD AUTO: 226 K/UL — SIGNIFICANT CHANGE UP (ref 150–400)
POTASSIUM SERPL-MCNC: 4.4 MMOL/L — SIGNIFICANT CHANGE UP (ref 3.5–5.3)
POTASSIUM SERPL-SCNC: 4.4 MMOL/L — SIGNIFICANT CHANGE UP (ref 3.5–5.3)
PROT SERPL-MCNC: 6.7 G/DL — SIGNIFICANT CHANGE UP (ref 6–8.3)
PROTHROM AB SERPL-ACNC: 10.2 SEC — SIGNIFICANT CHANGE UP (ref 9.9–13.4)
RBC # BLD: 4.41 M/UL — SIGNIFICANT CHANGE UP (ref 3.8–5.2)
RBC # FLD: 12.4 % — SIGNIFICANT CHANGE UP (ref 10.3–14.5)
RSV RNA NPH QL NAA+NON-PROBE: SIGNIFICANT CHANGE UP
SARS-COV-2 RNA SPEC QL NAA+PROBE: SIGNIFICANT CHANGE UP
SODIUM SERPL-SCNC: 141 MMOL/L — SIGNIFICANT CHANGE UP (ref 135–145)
TROPONIN I, HIGH SENSITIVITY RESULT: 7.2 NG/L — SIGNIFICANT CHANGE UP
TROPONIN I, HIGH SENSITIVITY RESULT: 9.1 NG/L — SIGNIFICANT CHANGE UP
WBC # BLD: 7.63 K/UL — SIGNIFICANT CHANGE UP (ref 3.8–10.5)
WBC # FLD AUTO: 7.63 K/UL — SIGNIFICANT CHANGE UP (ref 3.8–10.5)

## 2025-02-15 PROCEDURE — 99285 EMERGENCY DEPT VISIT HI MDM: CPT

## 2025-02-15 PROCEDURE — 71045 X-RAY EXAM CHEST 1 VIEW: CPT | Mod: 26

## 2025-02-15 RX ORDER — ACETAMINOPHEN 500 MG/5ML
650 LIQUID (ML) ORAL EVERY 6 HOURS
Refills: 0 | Status: DISCONTINUED | OUTPATIENT
Start: 2025-02-15 | End: 2025-02-17

## 2025-02-15 RX ORDER — ONDANSETRON HCL/PF 4 MG/2 ML
4 VIAL (ML) INJECTION EVERY 8 HOURS
Refills: 0 | Status: DISCONTINUED | OUTPATIENT
Start: 2025-02-15 | End: 2025-02-17

## 2025-02-15 RX ORDER — ENOXAPARIN SODIUM 100 MG/ML
40 INJECTION SUBCUTANEOUS EVERY 24 HOURS
Refills: 0 | Status: DISCONTINUED | OUTPATIENT
Start: 2025-02-15 | End: 2025-02-17

## 2025-02-15 RX ORDER — CLOPIDOGREL BISULFATE 75 MG/1
75 TABLET, FILM COATED ORAL DAILY
Refills: 0 | Status: DISCONTINUED | OUTPATIENT
Start: 2025-02-15 | End: 2025-02-17

## 2025-02-15 RX ORDER — ASPIRIN 325 MG
81 TABLET ORAL DAILY
Refills: 0 | Status: DISCONTINUED | OUTPATIENT
Start: 2025-02-15 | End: 2025-02-17

## 2025-02-15 RX ORDER — MAGNESIUM, ALUMINUM HYDROXIDE 200-200 MG
30 TABLET,CHEWABLE ORAL EVERY 4 HOURS
Refills: 0 | Status: DISCONTINUED | OUTPATIENT
Start: 2025-02-15 | End: 2025-02-17

## 2025-02-15 RX ORDER — METOPROLOL SUCCINATE 50 MG/1
25 TABLET, EXTENDED RELEASE ORAL DAILY
Refills: 0 | Status: DISCONTINUED | OUTPATIENT
Start: 2025-02-15 | End: 2025-02-17

## 2025-02-15 RX ORDER — ATORVASTATIN CALCIUM 80 MG/1
40 TABLET, FILM COATED ORAL AT BEDTIME
Refills: 0 | Status: DISCONTINUED | OUTPATIENT
Start: 2025-02-15 | End: 2025-02-17

## 2025-02-15 RX ORDER — CLOPIDOGREL BISULFATE 75 MG/1
300 TABLET, FILM COATED ORAL ONCE
Refills: 0 | Status: DISCONTINUED | OUTPATIENT
Start: 2025-02-15 | End: 2025-02-15

## 2025-02-15 RX ORDER — MELATONIN 5 MG
3 TABLET ORAL AT BEDTIME
Refills: 0 | Status: DISCONTINUED | OUTPATIENT
Start: 2025-02-15 | End: 2025-02-17

## 2025-02-15 RX ADMIN — ENOXAPARIN SODIUM 40 MILLIGRAM(S): 100 INJECTION SUBCUTANEOUS at 21:35

## 2025-02-15 RX ADMIN — ATORVASTATIN CALCIUM 40 MILLIGRAM(S): 80 TABLET, FILM COATED ORAL at 18:55

## 2025-02-15 RX ADMIN — CLOPIDOGREL BISULFATE 75 MILLIGRAM(S): 75 TABLET, FILM COATED ORAL at 18:55

## 2025-02-15 NOTE — ED PROVIDER NOTE - OBJECTIVE STATEMENT
79-year-old presenting with chest pain denies any nausea vomiting fever chills or cough symptoms started today endorses chest pain improved with sublingual nitro given prior to arrival patient denies any calf pain swelling

## 2025-02-15 NOTE — ED PROVIDER NOTE - IV ALTEPLASE EXCL REL HIDDEN
What Is The Reason For Today's Visit?: Full Body Skin Examination with No Concerns What Is The Reason For Today's Visit? (Being Monitored For X): concerning skin lesions on an annual basis show

## 2025-02-15 NOTE — ED ADULT NURSE NOTE - OBJECTIVE STATEMENT
78 yo female sitting on a chair c/o chest pain that started this morning. Patient denies shortness of breath, dizziness, nausea, or vomiting at this time.

## 2025-02-15 NOTE — ED ADULT TRIAGE NOTE - SPO2 (%)
[facility] [employer] single pacer     9 years on device     RV imped 380  Shock 0.75 @ 0.4  R waves 15.1  99.3% paced
98

## 2025-02-15 NOTE — PATIENT PROFILE ADULT - FALL HARM RISK - HARM RISK INTERVENTIONS
Communicate Risk of Fall with Harm to all staff/Monitor gait and stability/Reinforce activity limits and safety measures with patient and family/Tailored Fall Risk Interventions/Visual Cue: Yellow wristband and red socks/Bed in lowest position, wheels locked, appropriate side rails in place/Call bell, personal items and telephone in reach/Instruct patient to call for assistance before getting out of bed or chair/Non-slip footwear when patient is out of bed/[place] to call system/Physically safe environment - no spills, clutter or unnecessary equipment/Purposeful Proactive Rounding/Room/bathroom lighting operational, light cord in reach

## 2025-02-15 NOTE — ED ADULT NURSE NOTE - CCCP TRG CHIEF CMPLNT
chest pain Note Text (......Xxx Chief Complaint.): This diagnosis correlates with the Render Risk Assessment In Note?: no Detail Level: Zone Other (Free Text): Dad & Sister had MM. Sister passed on in her 40’s.

## 2025-02-15 NOTE — H&P ADULT - HISTORY OF PRESENT ILLNESS
79F PMH HTN, HLD, CAD, COPD presented to the ED with c/o chest pain. Patient stated the pain started at 12 pm today after she was upset with someone at the assisted living center. The pain was described as pressure, non radiating and lasted for 2 hours before subsiding with sublingual nitroglycerin provided by EMS. Patient also endorses SOB during the episode but states this pain was different in character and more severe than any previous episodes. Patient denies any fever, chills, dizziness ,headache, cough, palpitations, nausea, vomiting, diarrhea, abdominal pain, urinary symptoms, lower extremity pain, or edema. Patient denies recent travel or sick contacts.

## 2025-02-15 NOTE — H&P ADULT - PROBLEM SELECTOR PLAN 1
Trop WNL  EKG showing NSR no ischemic changes  s/p aspirin 81, plavix 75 and atorvastatin 40,  c/w metoprolol  f/u lipid panel, A1c  f/u TTE  cardio

## 2025-02-15 NOTE — ED PROVIDER NOTE - CLINICAL SUMMARY MEDICAL DECISION MAKING FREE TEXT BOX
patient presenting with chest pain otherwise vital stable denies any nausea vomiting abdominal pain denies any focal weakness numbness lungs clear no edema no calf pain swelling no signs of DVT will obtain lab test for ACS x-ray symptomatic treatment ED observation monitor reassess

## 2025-02-15 NOTE — H&P ADULT - NSHPPHYSICALEXAM_GEN_ALL_CORE
PHYSICAL EXAMINATION:  GENERAL: NAD  HEAD:  Atraumatic, Normocephalic  EYES:  conjunctiva and sclera clear  NECK: Supple, No JVD, Normal thyroid  CHEST/LUNG: Clear to auscultation. No rales, rhonchi, wheezing, or rubs  HEART: Regular rate and rhythm; No murmurs, rubs, or gallops  ABDOMEN: Soft, Nontender, Nondistended; Bowel sounds present  NERVOUS SYSTEM:  Alert & Oriented X3,    EXTREMITIES:  2+ Peripheral Pulses, No clubbing, cyanosis, or edema  SKIN: warm dry    T(C): 36.8 (02-15-25 @ 16:14), Max: 36.9 (02-15-25 @ 12:10)  HR: 71 (02-15-25 @ 16:14) (65 - 71)  BP: 133/69 (02-15-25 @ 16:14) (126/76 - 133/69)  RR: 20 (02-15-25 @ 16:14) (20 - 22)  SpO2: 98% (02-15-25 @ 16:14) (98% - 98%)

## 2025-02-15 NOTE — PATIENT PROFILE ADULT - DO YOU NEED ADDITIONAL SERVICES TO MANAGE ANY OF THESE MEDICAL CONDITIONS AT HOME?
Implanted Port Discharge Instructions      General Instructions:   A port is like an implanted IV. They are usually ordered for patients who will be getting chemotherapy, but can also be used as an IV for long term antibiotics, large amounts of fluids, and/or blood products. Your blood can be drawn from your port for labs also. Those patients who do not have good veins find the ports convenient as they can get the IV they need with one stick. The port can be used long term, and the care is easy. The device is under the skin, and once the skin heals, care is minimal. All that is required is the nurse who accesses the port will need to flush it with heparinized saline after each use. Ports are usually placed in the chest wall, usually on the right side. But they can be place in the arms and in the abdomen. Home Care Instructions:    Watch for signs of infection:    1. Redness,   2. Fever, chills,   3. Increased pain, and/or drainage from the site. If this occurs, call your physician at once. Keep your dressing clean and dry. Leave the dressing in place until seen here next week. The dressing may be changed in your physicians office. Continue your previous diet and follow the medication reconciliation list.    You may take Tylenol, as directed on the label, for pain. Avoid ibuprofen (Advil, Motrin) and aspirin as they may cause you to bleed. Because you received sedation, you are not to drive or sign any legal documents for the next 24 hours. Do not lift anything heavier than 5 pounds with the affected arm for the next week.     If you have any questions or concerns, please call [phone] between 0730 am and 10 pm.  After hours, [phone],ask the  to page the x-ray technologist and describe the problem to the technologist. no

## 2025-02-15 NOTE — ED ADULT TRIAGE NOTE - ACCOMPANIED BY
Problem: Physical Regulation:  Goal: Ability to maintain a stable neurologic state will improve  Description  Ability to maintain a stable neurologic state will improve  Outcome: Ongoing   Neuro checks q4 hours and PRN. Pt is alert and oriented x4 and has no complaints of neuro symptoms. Will continue to monitor. Problem: Safety:  Goal: Ability to remain free from injury will improve  Description  Ability to remain free from injury will improve  1/6/2020 1153 by Reese Dixon RN  Outcome: Ongoing    Telesitter in room. Patient instructed to use call light. Bed locked and in lowest position, side rails up 2/4, call light and bedside table within reach, clutter removed, and non-skid footwear on when pt out of bed. Hourly rounds will continue. EMT/paramedic

## 2025-02-16 LAB
A1C WITH ESTIMATED AVERAGE GLUCOSE RESULT: 5.7 % — HIGH (ref 4–5.6)
ALBUMIN SERPL ELPH-MCNC: 3.4 G/DL — LOW (ref 3.5–5)
ALP SERPL-CCNC: 73 U/L — SIGNIFICANT CHANGE UP (ref 40–120)
ALT FLD-CCNC: 25 U/L DA — SIGNIFICANT CHANGE UP (ref 10–60)
ANION GAP SERPL CALC-SCNC: 3 MMOL/L — LOW (ref 5–17)
AST SERPL-CCNC: 16 U/L — SIGNIFICANT CHANGE UP (ref 10–40)
BASOPHILS # BLD AUTO: 0.05 K/UL — SIGNIFICANT CHANGE UP (ref 0–0.2)
BASOPHILS NFR BLD AUTO: 0.6 % — SIGNIFICANT CHANGE UP (ref 0–2)
BILIRUB SERPL-MCNC: 0.9 MG/DL — SIGNIFICANT CHANGE UP (ref 0.2–1.2)
BUN SERPL-MCNC: 21 MG/DL — HIGH (ref 7–18)
CALCIUM SERPL-MCNC: 9.1 MG/DL — SIGNIFICANT CHANGE UP (ref 8.4–10.5)
CHLORIDE SERPL-SCNC: 111 MMOL/L — HIGH (ref 96–108)
CHOLEST SERPL-MCNC: 183 MG/DL — SIGNIFICANT CHANGE UP
CO2 SERPL-SCNC: 28 MMOL/L — SIGNIFICANT CHANGE UP (ref 22–31)
CREAT SERPL-MCNC: 0.88 MG/DL — SIGNIFICANT CHANGE UP (ref 0.5–1.3)
EGFR: 66 ML/MIN/1.73M2 — SIGNIFICANT CHANGE UP
EOSINOPHIL # BLD AUTO: 0.13 K/UL — SIGNIFICANT CHANGE UP (ref 0–0.5)
EOSINOPHIL NFR BLD AUTO: 1.6 % — SIGNIFICANT CHANGE UP (ref 0–6)
ESTIMATED AVERAGE GLUCOSE: 117 MG/DL — HIGH (ref 68–114)
GLUCOSE SERPL-MCNC: 109 MG/DL — HIGH (ref 70–99)
HCT VFR BLD CALC: 40.7 % — SIGNIFICANT CHANGE UP (ref 34.5–45)
HDLC SERPL-MCNC: 66 MG/DL — SIGNIFICANT CHANGE UP
HGB BLD-MCNC: 13.8 G/DL — SIGNIFICANT CHANGE UP (ref 11.5–15.5)
IMM GRANULOCYTES NFR BLD AUTO: 0.4 % — SIGNIFICANT CHANGE UP (ref 0–0.9)
LIPID PNL WITH DIRECT LDL SERPL: 90 MG/DL — SIGNIFICANT CHANGE UP
LYMPHOCYTES # BLD AUTO: 2.43 K/UL — SIGNIFICANT CHANGE UP (ref 1–3.3)
LYMPHOCYTES # BLD AUTO: 30.1 % — SIGNIFICANT CHANGE UP (ref 13–44)
MAGNESIUM SERPL-MCNC: 2.5 MG/DL — SIGNIFICANT CHANGE UP (ref 1.6–2.6)
MCHC RBC-ENTMCNC: 30.8 PG — SIGNIFICANT CHANGE UP (ref 27–34)
MCHC RBC-ENTMCNC: 33.9 G/DL — SIGNIFICANT CHANGE UP (ref 32–36)
MCV RBC AUTO: 90.8 FL — SIGNIFICANT CHANGE UP (ref 80–100)
MONOCYTES # BLD AUTO: 0.47 K/UL — SIGNIFICANT CHANGE UP (ref 0–0.9)
MONOCYTES NFR BLD AUTO: 5.8 % — SIGNIFICANT CHANGE UP (ref 2–14)
NEUTROPHILS # BLD AUTO: 4.96 K/UL — SIGNIFICANT CHANGE UP (ref 1.8–7.4)
NEUTROPHILS NFR BLD AUTO: 61.5 % — SIGNIFICANT CHANGE UP (ref 43–77)
NON HDL CHOLESTEROL: 117 MG/DL — SIGNIFICANT CHANGE UP
NRBC BLD AUTO-RTO: 0 /100 WBCS — SIGNIFICANT CHANGE UP (ref 0–0)
PHOSPHATE SERPL-MCNC: 3.4 MG/DL — SIGNIFICANT CHANGE UP (ref 2.5–4.5)
PLATELET # BLD AUTO: 231 K/UL — SIGNIFICANT CHANGE UP (ref 150–400)
POTASSIUM SERPL-MCNC: 4.6 MMOL/L — SIGNIFICANT CHANGE UP (ref 3.5–5.3)
POTASSIUM SERPL-SCNC: 4.6 MMOL/L — SIGNIFICANT CHANGE UP (ref 3.5–5.3)
PROT SERPL-MCNC: 6.7 G/DL — SIGNIFICANT CHANGE UP (ref 6–8.3)
RBC # BLD: 4.48 M/UL — SIGNIFICANT CHANGE UP (ref 3.8–5.2)
RBC # FLD: 12.6 % — SIGNIFICANT CHANGE UP (ref 10.3–14.5)
SODIUM SERPL-SCNC: 142 MMOL/L — SIGNIFICANT CHANGE UP (ref 135–145)
TRIGL SERPL-MCNC: 160 MG/DL — HIGH
WBC # BLD: 8.07 K/UL — SIGNIFICANT CHANGE UP (ref 3.8–10.5)
WBC # FLD AUTO: 8.07 K/UL — SIGNIFICANT CHANGE UP (ref 3.8–10.5)

## 2025-02-16 RX ADMIN — ATORVASTATIN CALCIUM 40 MILLIGRAM(S): 80 TABLET, FILM COATED ORAL at 21:32

## 2025-02-16 RX ADMIN — METOPROLOL SUCCINATE 25 MILLIGRAM(S): 50 TABLET, EXTENDED RELEASE ORAL at 05:53

## 2025-02-16 RX ADMIN — CLOPIDOGREL BISULFATE 75 MILLIGRAM(S): 75 TABLET, FILM COATED ORAL at 11:46

## 2025-02-16 RX ADMIN — Medication 81 MILLIGRAM(S): at 11:46

## 2025-02-16 RX ADMIN — Medication 1 DOSE(S): at 21:32

## 2025-02-16 RX ADMIN — Medication 1 DOSE(S): at 11:46

## 2025-02-16 RX ADMIN — ENOXAPARIN SODIUM 40 MILLIGRAM(S): 100 INJECTION SUBCUTANEOUS at 21:33

## 2025-02-16 NOTE — PROGRESS NOTE ADULT - REASON FOR ADMISSION
Care Coordinator Progress Note     Admission Date/Time:  1/7/2018  Attending MD:  Adarsh Georges MD     Data  Chart reviewed, discussed with interdisciplinary team.   Patient was admitted for:    Hypokalemia  Urinary tract infection in female  Rectal prolapse  Generalized weakness  Benign essential hypertension.    Concerns with insurance coverage for discharge needs: None; [employer]  Current Living Situation: Patient lives alone; [facility] w/ per kevin services  Support System: Supportive and Involved; daughter (lives in [facility]), son (presently in [facility])  Services Involved: Home Care; [facility]  Transportation: Daughter will drive home  Barriers to Discharge: lab results and resolution of admitting symptoms    Coordination of Care  D: Chart reviewed and plan of care discussed with MD team and patients daughterLubna. Patient admitted for elevated blood pressure and UTI. Per Medical Team plan for patient to discharge tonight or tomorrow.     I/A: Discharge needs include resumption of Home Care Services through [facility]. Daughter Lubna also indicated patient receives per kevin services directly from the [facility]. These services are paid for arranged by Lubna. Lubna stated she is patients POA and paperwork should be in our system. Lubna stated she feels patient will need a TCU after surgery on Friday 1/12/18; BONIFACIO Crump updated. Lubna will be staying with patient at [facility] all week until surgery. No additional needs at this time.     P: Care Coordinator will remain available for discharge needs that may arise.     Referrals: Patient/family requested resumption of Home Care.       [facility]  Phone  [phone]  Fax  [phone]    Resumption of Home Care, please note new orders    RN skilled nursing visit, weekly (same day and time of the week is requested by patient and POA).   RN to assess weight and vital signs - specifically blood pressure; document all!  RN to  ACS r/o assess respiratory and cardiac status, pain level and activity tolerance, hydration, nutrition and bowel status.  RN to assess home safety and make recommendations to POA.  RN to complete weekly medication set-up and management.  RN to call to POA after completion of every visit and provide details; Lubna Ph: [phone]    Your provider has ordered home care nursing services.   If you have not been contacted within 2 days of your discharge please call       Plan  Anticipated Discharge Date:  Tonight or tomorrow  Anticipated Discharge Plan:  Home w/ resumption of services      Nicole LEVYN RN PHN  Patient Care Management Coordinator  [facility]  Phone: [phone] / Pager: [phone]

## 2025-02-16 NOTE — PROGRESS NOTE ADULT - SUBJECTIVE AND OBJECTIVE BOX
MR#[id number]  PATIENT NAME:ZEINAB GAR    DATE OF SERVICE: 02-16-25 @ 11:26  Patient was seen and examined by Cole Cooper MD on    02-16-25 @ 11:26 .  Interim events noted.Consultant notes ,Labs,Telemetry reviewed by me       HOSPITAL COURSE: HPI:  79F [facility] HTN, HLD, CAD, COPD presented to the ED with c/o chest pain. Patient stated the pain started at 12 pm today after she was upset with someone at the assisted living center. The pain was described as pressure, non radiating and lasted for 2 hours before subsiding with sublingual nitroglycerin provided by EMS. Patient also endorses SOB during the episode but states this pain was different in character and more severe than any previous episodes. Patient denies any fever, chills, dizziness ,headache, cough, palpitations, nausea, vomiting, diarrhea, abdominal pain, urinary symptoms, lower extremity pain, or edema. Patient denies recent travel or sick contacts.  (15 Feb 2025 18:26)      INTERIM EVENTS:Patient seen at bedside ,interim events noted.      [facility] -reviewed admission note, no change since admission  HEART FAILURE: Acute[ ]Chronic[ ] Systolic[ ] Diastolic[ ] Combined Systolic and Diastolic[ ]  CAD[ ] CABG[ ] PCI[ ]  DEVICES[ ] PPM[ ] ICD[ ] ILR[ ]  ATRIAL FIBRILLATION[ ] Paroxysmal[ ] Permanent[ ] CHADS2-[  ]  SAE[ ] CKD1[ ] CKD2[ ] CKD3[ ] CKD4[ ] ESRD[ ]  COPD[ ] HTN[ ]   DM[ ] Type1[ ] Type 2[ ]   CVA[ ] Paresis[ ]    AMBULATION: Assisted[ ] Cane/walker[ ] Independent[ ]    MEDICATIONS  (STANDING):  aspirin enteric coated 81 milliGRAM(s) Oral daily  atorvastatin 40 milliGRAM(s) Oral at bedtime  clopidogrel Tablet 75 milliGRAM(s) Oral daily  enoxaparin Injectable 40 milliGRAM(s) SubCutaneous every 24 hours  fluticasone propionate/ salmeterol 250-50 MICROgram(s) Diskus 1 Dose(s) Inhalation two times a day  hydrochlorothiazide 12.5 milliGRAM(s) Oral daily  metoprolol succinate ER 25 milliGRAM(s) Oral daily    MEDICATIONS  (PRN):  acetaminophen     Tablet .. 650 milliGRAM(s) Oral every 6 hours PRN Temp greater or equal to 38C (100.4F), Mild Pain (1 - 3)  aluminum hydroxide/magnesium hydroxide/simethicone Suspension 30 milliLiter(s) Oral every 4 hours PRN Dyspepsia  melatonin 3 milliGRAM(s) Oral at bedtime PRN Insomnia  ondansetron Injectable 4 milliGRAM(s) IV Push every 8 hours PRN Nausea and/or Vomiting            REVIEW OF SYSTEMS:  Constitutional: [ ] fever, [ ]weight loss,  [ ]fatigue [ ]weight gain  Eyes: [ ] visual changes  Respiratory: [ ]shortness of breath;  [ ] cough, [ ]wheezing, [ ]chills, [ ]hemoptysis  Cardiovascular: [ ] chest pain, [ ]palpitations, [ ]dizziness,  [ ]leg swelling[ ]orthopnea[ ]PND  Gastrointestinal: [ ] abdominal pain, [ ]nausea, [ ]vomiting,  [ ]diarrhea [ ]Constipation [ ]Melena  Genitourinary: [ ] dysuria, [ ] hematuria [ ]Marques  Neurologic: [ ] headaches [ ] tremors[ ]weakness [ ]Paralysis Right[ ] Left[ ]  Skin: [ ] itching, [ ]burning, [ ] rashes  Endocrine: [ ] heat or cold intolerance  Musculoskeletal: [ ] joint pain or swelling; [ ] muscle, back, or extremity pain  Psychiatric: [ ] depression, [ ]anxiety, [ ]mood swings, or [ ]difficulty sleeping  Hematologic: [ ] easy bruising, [ ] bleeding gums    [ ] All remaining systems negative except as per above.   [ ]Unable to obtain.  [x] No change in ROS since admission      Vital Signs Last 24 Hrs  T(C): 36.8 (16 Feb 2025 09:52), Max: 36.9 (15 Feb 2025 12:10)  T(F): 98.3 (16 Feb 2025 09:52), Max: 98.5 (15 Feb 2025 12:10)  HR: 66 (16 Feb 2025 09:52) (65 - 77)  BP: 149/72 (16 Feb 2025 09:52) (126/76 - 167/83)  BP(mean): 98 (15 Feb 2025 20:41) (98 - 98)  RR: 17 (16 Feb 2025 09:52) (17 - 22)  SpO2: 96% (16 Feb 2025 09:52) (94% - 98%)    Parameters below as of 16 Feb 2025 09:52  Patient On (Oxygen Delivery Method): room air      I&O's Summary      PHYSICAL EXAM:  General: No acute distress BMI-  HEENT: EOMI, PERRL  Neck: Supple, [ ] JVD  Lungs: Equal air entry bilaterally; [ ] rales [ ] wheezing [ ] rhonchi  Heart: Regular rate and rhythm; [x ] murmur   2/6 [ x] systolic [ ] diastolic [ ] radiation[ ] rubs [ ]  gallops  Abdomen: Nontender, bowel sounds present  Extremities: No clubbing, cyanosis, [ ] edema [ ]Pulses  equal and intact  Nervous system:  Alert & Oriented X3, no focal deficits  Psychiatric: Normal affect  Skin: No rashes or lesions    LABS:  02-16    142  |  111[H]  |  21[H]  ----------------------------<  109[H]  4.6   |  28  |  0.88    Ca    9.1      16 Feb 2025 05:18  Phos  3.4     02-16  Mg     2.5     02-16    TPro  6.7  /  Alb  3.4[L]  /  TBili  0.9  /  DBili  x   /  AST  16  /  ALT  25  /  AlkPhos  73  02-16    Creatinine Trend: 0.88<--, 0.82<--                        13.8   8.07  )-----------( 231      ( 16 Feb 2025 05:18 )             40.7     PT/INR - ( 15 Feb 2025 12:35 )   PT: 10.2 sec;   INR: 0.88 ratio         PTT - ( 15 Feb 2025 12:35 )  PTT:32.0 sec

## 2025-02-17 ENCOUNTER — TRANSCRIPTION ENCOUNTER (OUTPATIENT)
Age: 80
End: 2025-02-17

## 2025-02-17 VITALS
OXYGEN SATURATION: 96 % | DIASTOLIC BLOOD PRESSURE: 73 MMHG | SYSTOLIC BLOOD PRESSURE: 149 MMHG | RESPIRATION RATE: 18 BRPM | TEMPERATURE: 98 F | HEART RATE: 82 BPM

## 2025-02-17 LAB
ALBUMIN SERPL ELPH-MCNC: 3.5 G/DL — SIGNIFICANT CHANGE UP (ref 3.5–5)
ALP SERPL-CCNC: 73 U/L — SIGNIFICANT CHANGE UP (ref 40–120)
ALT FLD-CCNC: 24 U/L DA — SIGNIFICANT CHANGE UP (ref 10–60)
ANION GAP SERPL CALC-SCNC: 8 MMOL/L — SIGNIFICANT CHANGE UP (ref 5–17)
AST SERPL-CCNC: 15 U/L — SIGNIFICANT CHANGE UP (ref 10–40)
BASOPHILS # BLD AUTO: 0.04 K/UL — SIGNIFICANT CHANGE UP (ref 0–0.2)
BASOPHILS NFR BLD AUTO: 0.6 % — SIGNIFICANT CHANGE UP (ref 0–2)
BILIRUB SERPL-MCNC: 0.9 MG/DL — SIGNIFICANT CHANGE UP (ref 0.2–1.2)
BUN SERPL-MCNC: 21 MG/DL — HIGH (ref 7–18)
CALCIUM SERPL-MCNC: 9 MG/DL — SIGNIFICANT CHANGE UP (ref 8.4–10.5)
CHLORIDE SERPL-SCNC: 107 MMOL/L — SIGNIFICANT CHANGE UP (ref 96–108)
CO2 SERPL-SCNC: 25 MMOL/L — SIGNIFICANT CHANGE UP (ref 22–31)
CREAT SERPL-MCNC: 0.91 MG/DL — SIGNIFICANT CHANGE UP (ref 0.5–1.3)
EGFR: 63 ML/MIN/1.73M2 — SIGNIFICANT CHANGE UP
EOSINOPHIL # BLD AUTO: 0.04 K/UL — SIGNIFICANT CHANGE UP (ref 0–0.5)
EOSINOPHIL NFR BLD AUTO: 0.6 % — SIGNIFICANT CHANGE UP (ref 0–6)
GLUCOSE SERPL-MCNC: 188 MG/DL — HIGH (ref 70–99)
HCT VFR BLD CALC: 41.5 % — SIGNIFICANT CHANGE UP (ref 34.5–45)
HGB BLD-MCNC: 14.3 G/DL — SIGNIFICANT CHANGE UP (ref 11.5–15.5)
IMM GRANULOCYTES NFR BLD AUTO: 0.6 % — SIGNIFICANT CHANGE UP (ref 0–0.9)
LYMPHOCYTES # BLD AUTO: 1.79 K/UL — SIGNIFICANT CHANGE UP (ref 1–3.3)
LYMPHOCYTES # BLD AUTO: 25.2 % — SIGNIFICANT CHANGE UP (ref 13–44)
MAGNESIUM SERPL-MCNC: 2.3 MG/DL — SIGNIFICANT CHANGE UP (ref 1.6–2.6)
MCHC RBC-ENTMCNC: 31.2 PG — SIGNIFICANT CHANGE UP (ref 27–34)
MCHC RBC-ENTMCNC: 34.5 G/DL — SIGNIFICANT CHANGE UP (ref 32–36)
MCV RBC AUTO: 90.4 FL — SIGNIFICANT CHANGE UP (ref 80–100)
MONOCYTES # BLD AUTO: 0.44 K/UL — SIGNIFICANT CHANGE UP (ref 0–0.9)
MONOCYTES NFR BLD AUTO: 6.2 % — SIGNIFICANT CHANGE UP (ref 2–14)
NEUTROPHILS # BLD AUTO: 4.74 K/UL — SIGNIFICANT CHANGE UP (ref 1.8–7.4)
NEUTROPHILS NFR BLD AUTO: 66.8 % — SIGNIFICANT CHANGE UP (ref 43–77)
NRBC BLD AUTO-RTO: 0 /100 WBCS — SIGNIFICANT CHANGE UP (ref 0–0)
PHOSPHATE SERPL-MCNC: 3.7 MG/DL — SIGNIFICANT CHANGE UP (ref 2.5–4.5)
PLATELET # BLD AUTO: 258 K/UL — SIGNIFICANT CHANGE UP (ref 150–400)
POTASSIUM SERPL-MCNC: 3.5 MMOL/L — SIGNIFICANT CHANGE UP (ref 3.5–5.3)
POTASSIUM SERPL-SCNC: 3.5 MMOL/L — SIGNIFICANT CHANGE UP (ref 3.5–5.3)
PROT SERPL-MCNC: 6.9 G/DL — SIGNIFICANT CHANGE UP (ref 6–8.3)
RBC # BLD: 4.59 M/UL — SIGNIFICANT CHANGE UP (ref 3.8–5.2)
RBC # FLD: 12.4 % — SIGNIFICANT CHANGE UP (ref 10.3–14.5)
SODIUM SERPL-SCNC: 140 MMOL/L — SIGNIFICANT CHANGE UP (ref 135–145)
WBC # BLD: 7.09 K/UL — SIGNIFICANT CHANGE UP (ref 3.8–10.5)
WBC # FLD AUTO: 7.09 K/UL — SIGNIFICANT CHANGE UP (ref 3.8–10.5)

## 2025-02-17 PROCEDURE — 93306 TTE W/DOPPLER COMPLETE: CPT

## 2025-02-17 PROCEDURE — 83735 ASSAY OF MAGNESIUM: CPT

## 2025-02-17 PROCEDURE — 85610 PROTHROMBIN TIME: CPT

## 2025-02-17 PROCEDURE — 71045 X-RAY EXAM CHEST 1 VIEW: CPT

## 2025-02-17 PROCEDURE — 94640 AIRWAY INHALATION TREATMENT: CPT

## 2025-02-17 PROCEDURE — 85025 COMPLETE CBC W/AUTO DIFF WBC: CPT

## 2025-02-17 PROCEDURE — 99285 EMERGENCY DEPT VISIT HI MDM: CPT | Mod: 25

## 2025-02-17 PROCEDURE — 85730 THROMBOPLASTIN TIME PARTIAL: CPT

## 2025-02-17 PROCEDURE — 36415 COLL VENOUS BLD VENIPUNCTURE: CPT

## 2025-02-17 PROCEDURE — 87637 SARSCOV2&INF A&B&RSV AMP PRB: CPT

## 2025-02-17 PROCEDURE — 84100 ASSAY OF PHOSPHORUS: CPT

## 2025-02-17 PROCEDURE — 80053 COMPREHEN METABOLIC PANEL: CPT

## 2025-02-17 PROCEDURE — 93005 ELECTROCARDIOGRAM TRACING: CPT

## 2025-02-17 PROCEDURE — 83036 HEMOGLOBIN GLYCOSYLATED A1C: CPT

## 2025-02-17 PROCEDURE — 80061 LIPID PANEL: CPT

## 2025-02-17 PROCEDURE — 96372 THER/PROPH/DIAG INJ SC/IM: CPT

## 2025-02-17 PROCEDURE — 84484 ASSAY OF TROPONIN QUANT: CPT

## 2025-02-17 RX ADMIN — Medication 1 DOSE(S): at 09:48

## 2025-02-17 RX ADMIN — Medication 81 MILLIGRAM(S): at 12:06

## 2025-02-17 RX ADMIN — CLOPIDOGREL BISULFATE 75 MILLIGRAM(S): 75 TABLET, FILM COATED ORAL at 12:06

## 2025-02-17 RX ADMIN — METOPROLOL SUCCINATE 25 MILLIGRAM(S): 50 TABLET, EXTENDED RELEASE ORAL at 05:25

## 2025-02-17 NOTE — DISCHARGE NOTE PROVIDER - HOSPITAL COURSE
79F [facility] HTN, HLD, CAD, COPD presented to the ED with c/o chest pain. Patient stated the pain started at 12 pm today after she was upset with someone at the assisted living center. The pain was described as pressure, non radiating and lasted for 2 hours before subsiding with sublingual nitroglycerin provided by EMS. Patient also endorses SOB during the episode but states this pain was different in character and more severe than any previous episodes. Patient denies any fever, chills, dizziness ,headache, cough, palpitations, nausea, vomiting, diarrhea, abdominal pain, urinary symptoms, lower extremity pain, or edema. Patient denies recent travel or sick 79F [facility] HTN, HLD, CAD, COPD presented to the ED with c/o chest pain. Patient stated the pain started at 12 pm today after she was upset with someone at the assisted living center. The pain was described as pressure, non radiating and lasted for 2 hours before subsiding with sublingual nitroglycerin provided by EMS. Patient also endorses SOB during the episode but states this pain was different in character and more severe than any previous episodes. She was admitted to telemetry for ACS rule out. Troponin was WNL and EKG was nomal. Lipid panel showed LDL of 90, A1c was 5.7 and vitals remained stable during her stay. TTE EF 69%.  Home medications were restarted for CAD, HTN and HLD.    Given patient's improved clinical status and current hemodynamic stability, decision was made to discharge the patient. Patient is stable for discharge per attending and is advised to follow up with PCP as outpatient. PATIENT SEEN AND EXAMINED BY HUGO MULTANI M.D. ON :-02/17/2025  DATE OF SERVICE:     02/17/2025        Interim events noted,Labs ,Radiological studies and Cardiology tests reviewed .        79 F [facility] HTN, HLD, CAD, COPD presented to the ED with c/o chest pain. Patient stated the pain started at 12 pm today after she was upset with someone at the assisted living center. The pain was described as pressure, non radiating and lasted for 2 hours before subsiding with sublingual nitroglycerin provided by EMS. Patient also endorses SOB during the episode but states this pain was different in character and more severe than any previous episodes. She was admitted to telemetry for ACS rule out. Troponin was WNL and EKG was nomal. Lipid panel showed LDL of 90, A1c was 5.7 and vitals remained stable during her stay. TTE EF 69%.  Home medications were restarted for CAD, HTN and HLD.    Given patient's improved clinical status and current hemodynamic stability, decision was made to discharge the patient. Patient is stable for discharge per attending and is advised to follow up with PCP as outpatient.

## 2025-02-17 NOTE — DISCHARGE NOTE NURSING/CASE MANAGEMENT/SOCIAL WORK - NSDCPEFALRISK_GEN_ALL_CORE
For information on Fall & Injury Prevention, visit: [email or web address] OR  [email or web address] OR  https://www.cdc.gov/steadi/patient.html

## 2025-02-17 NOTE — DISCHARGE NOTE PROVIDER - NSDCCPCAREPLAN_GEN_ALL_CORE_FT
PRINCIPAL DISCHARGE DIAGNOSIS  Diagnosis: Chest pain  Assessment and Plan of Treatment: You came in with chest pain. An EKG was done which was normal, troponin (heart enzymes) were normal and an Echocardiogram of your heart was normal. Please continue taking ASPIRIN 81mg ONCE A DAY. Please follow up with your PCP within one week of discharge.      SECONDARY DISCHARGE DIAGNOSES  Diagnosis: CAD (coronary artery disease)  Assessment and Plan of Treatment: You have history of coronary artery disease which is a narrowing of the arteries of your heart caused by a buildup of plaque made of cholesterol. The narrowing decreased the amount of blood flow to your heart causing chest pain, shortness of breath, sweating.   You are taking ASA, Metoprolol as home medications.  Please continue to take your medications as prescribed.  Please follow with your PCP/Cardiologist in a week.    Diagnosis: HLD (hyperlipidemia)  Assessment and Plan of Treatment: You have history of Hyperlipidemia. On this admission you were found to have abnormal high lipid profile.  Please take your medication as prescribed. Maintain healthy lifestyle, low fat diet, exercise regularly and check your lipid levels routinely.   Please follow up with your PCP in 1 week from discharge.    Diagnosis: HTN (hypertension)  Assessment and Plan of Treatment: You have a history of Hypertension.   Your blood pressure target is 120-140/80-90, maintain healthy lifestyle, low salt diet, avoid fatty food, weight loss, exercise regularly or stay active as tolerated 30 mins X 3 times per week.  Notify your doctor if you have any of the following symptoms:   (Dizziness, Lightheadedness, Blurry vision, Headache, Chest pain, Shortness of breath.)  Please continue taking your home medications of HYDROCHLOROTHIAZIDE 12.5MG ONCE A DAY and follow-up with your PCP in 1 week from discharge to adjust medications as needed.

## 2025-02-17 NOTE — DISCHARGE NOTE PROVIDER - PROVIDER TOKENS
PROVIDER:[TOKEN:[[id number]],FOLLOWUP:[1 week]],PROVIDER:[TOKEN:[[id number]],FOLLOWUP:[1 week]]

## 2025-02-17 NOTE — DISCHARGE NOTE PROVIDER - NSDCFUADDAPPT_GEN_ALL_CORE_FT
APPTS ARE READY TO BE MADE: [ ] YES    Best Family or Patient Contact (if needed):    Additional Information about above appointments (if needed):    1: Follow up with your PCP within one week of discharge.  2: Follow up with your Cardiologist within one week of discharge.  3:     Other comments or requests:

## 2025-02-17 NOTE — DISCHARGE NOTE NURSING/CASE MANAGEMENT/SOCIAL WORK - PATIENT PORTAL LINK FT
You can access the FollowMyHealth Patient Portal offered by [facility] by registering at the following website: [email or web address]. By joining [employer]’s FollowMyHealth portal, you will also be able to view your health information using other applications (apps) compatible with our system.

## 2025-02-17 NOTE — DISCHARGE NOTE PROVIDER - NSDCMRMEDTOKEN_GEN_ALL_CORE_FT
ascorbic acid: 1 tab(s) orally once a day  aspirin 81 mg oral delayed release tablet: 1 tab(s) orally once a day  atorvastatin 10 mg oral tablet: 1 tab(s) orally once a day  budesonide-formoterol 160 mcg-4.5 mcg/inh inhalation aerosol: 2 puff(s) inhaled 2 times a day  calcium carbonate: 1 tab(s) orally once a day  cholecalciferol: 1 cap(s) orally once a day  Fish Oil oral capsule: 1 cap(s) orally once a day  hydroCHLOROthiazide 12.5 mg oral tablet: 1 tab(s) orally once a day  magnesium oxide 400 mg oral tablet: 1 tab(s) orally 2 times a day  metoprolol succinate 25 mg oral tablet, extended release: 1 tab(s) orally once a day  Multiple Vitamins oral tablet: 1 tab(s) orally once a day  Vitamin B Complex oral capsule: 1 cap(s) orally once a day   ascorbic acid: 1 tab(s) orally once a day  aspirin 81 mg oral delayed release tablet: 1 tab(s) orally once a day  atorvastatin 10 mg oral tablet: 1 tab(s) orally once a day  budesonide-formoterol 160 mcg-4.5 mcg/inh inhalation aerosol: 2 puff(s) inhaled 2 times a day  calcium carbonate: 1 tab(s) orally once a day  cholecalciferol: 1 cap(s) orally once a day  Fish Oil oral capsule: 1 cap(s) orally once a day  fluticasone-salmeterol 100 mcg-50 mcg inhalation powder: 1 inhaled 2 times a day as needed for  bronchospasm  hydroCHLOROthiazide 12.5 mg oral tablet: 1 tab(s) orally once a day  magnesium oxide 400 mg oral tablet: 1 tab(s) orally 2 times a day  metoprolol succinate 25 mg oral tablet, extended release: 1 tab(s) orally once a day  Multiple Vitamins oral tablet: 1 tab(s) orally once a day  Vitamin B Complex oral capsule: 1 cap(s) orally once a day

## 2025-02-17 NOTE — DISCHARGE NOTE PROVIDER - ATTENDING DISCHARGE PHYSICAL EXAMINATION:
- Review of records, telemetry, vital signs and daily labs.   - General and cardiovascular physical examination.  - Generation of cardiovascular treatment plan and completion of note .  - Coordination of care.      Patient was seen and examined by me on 02/17/2025 ,interim events noted,labs and radiology studies reviewed.  Cole Cooper MD,FACC.  [address] [place], [place] [id number].  [phone]  Availabe to call or text on [employer].

## 2025-02-17 NOTE — DISCHARGE NOTE PROVIDER - CARE PROVIDER_API CALL
Shant Brown  Internal Medicine  [address] [address] [place] [address]  Phone: [phone]  Fax: [phone]  Follow Up Time: 1 week    Cole Cooper  Cardiology  [address] [place] [address]  Phone: [phone]  Fax: [phone]  Follow Up Time: 1 week

## 2025-03-14 ENCOUNTER — EMERGENCY (EMERGENCY)
Facility: HOSPITAL | Age: 80
LOS: 1 days | Discharge: HOME | End: 2025-03-14
Attending: EMERGENCY MEDICINE
Payer: MEDICARE

## 2025-03-14 VITALS
TEMPERATURE: 99 F | RESPIRATION RATE: 17 BRPM | SYSTOLIC BLOOD PRESSURE: 129 MMHG | DIASTOLIC BLOOD PRESSURE: 74 MMHG | OXYGEN SATURATION: 96 % | HEART RATE: 77 BPM | WEIGHT: 145.51 LBS

## 2025-03-14 VITALS
DIASTOLIC BLOOD PRESSURE: 65 MMHG | RESPIRATION RATE: 17 BRPM | OXYGEN SATURATION: 96 % | TEMPERATURE: 98 F | SYSTOLIC BLOOD PRESSURE: 113 MMHG | HEART RATE: 79 BPM

## 2025-03-14 LAB
ALBUMIN SERPL ELPH-MCNC: 3.1 G/DL — LOW (ref 3.5–5)
ALP SERPL-CCNC: 78 U/L — SIGNIFICANT CHANGE UP (ref 40–120)
ALT FLD-CCNC: 31 U/L DA — SIGNIFICANT CHANGE UP (ref 10–60)
ANION GAP SERPL CALC-SCNC: 8 MMOL/L — SIGNIFICANT CHANGE UP (ref 5–17)
AST SERPL-CCNC: 31 U/L — SIGNIFICANT CHANGE UP (ref 10–40)
BASOPHILS # BLD AUTO: 0.04 K/UL — SIGNIFICANT CHANGE UP (ref 0–0.2)
BASOPHILS NFR BLD AUTO: 0.4 % — SIGNIFICANT CHANGE UP (ref 0–2)
BILIRUB SERPL-MCNC: 0.4 MG/DL — SIGNIFICANT CHANGE UP (ref 0.2–1.2)
BUN SERPL-MCNC: 25 MG/DL — HIGH (ref 7–18)
CALCIUM SERPL-MCNC: 9.1 MG/DL — SIGNIFICANT CHANGE UP (ref 8.4–10.5)
CHLORIDE SERPL-SCNC: 103 MMOL/L — SIGNIFICANT CHANGE UP (ref 96–108)
CO2 SERPL-SCNC: 23 MMOL/L — SIGNIFICANT CHANGE UP (ref 22–31)
CREAT SERPL-MCNC: 0.94 MG/DL — SIGNIFICANT CHANGE UP (ref 0.5–1.3)
EGFR: 61 ML/MIN/1.73M2 — SIGNIFICANT CHANGE UP
EGFR: 61 ML/MIN/1.73M2 — SIGNIFICANT CHANGE UP
EOSINOPHIL # BLD AUTO: 0.24 K/UL — SIGNIFICANT CHANGE UP (ref 0–0.5)
EOSINOPHIL NFR BLD AUTO: 2.6 % — SIGNIFICANT CHANGE UP (ref 0–6)
GLUCOSE SERPL-MCNC: 143 MG/DL — HIGH (ref 70–99)
HCT VFR BLD CALC: 38.8 % — SIGNIFICANT CHANGE UP (ref 34.5–45)
HGB BLD-MCNC: 13.5 G/DL — SIGNIFICANT CHANGE UP (ref 11.5–15.5)
IMM GRANULOCYTES NFR BLD AUTO: 0.5 % — SIGNIFICANT CHANGE UP (ref 0–0.9)
LYMPHOCYTES # BLD AUTO: 2.48 K/UL — SIGNIFICANT CHANGE UP (ref 1–3.3)
LYMPHOCYTES # BLD AUTO: 27.2 % — SIGNIFICANT CHANGE UP (ref 13–44)
MCHC RBC-ENTMCNC: 31.8 PG — SIGNIFICANT CHANGE UP (ref 27–34)
MCHC RBC-ENTMCNC: 34.8 G/DL — SIGNIFICANT CHANGE UP (ref 32–36)
MCV RBC AUTO: 91.3 FL — SIGNIFICANT CHANGE UP (ref 80–100)
MONOCYTES # BLD AUTO: 0.58 K/UL — SIGNIFICANT CHANGE UP (ref 0–0.9)
MONOCYTES NFR BLD AUTO: 6.4 % — SIGNIFICANT CHANGE UP (ref 2–14)
NEUTROPHILS # BLD AUTO: 5.74 K/UL — SIGNIFICANT CHANGE UP (ref 1.8–7.4)
NEUTROPHILS NFR BLD AUTO: 62.9 % — SIGNIFICANT CHANGE UP (ref 43–77)
NRBC BLD AUTO-RTO: 0 /100 WBCS — SIGNIFICANT CHANGE UP (ref 0–0)
PLATELET # BLD AUTO: 211 K/UL — SIGNIFICANT CHANGE UP (ref 150–400)
POTASSIUM SERPL-MCNC: 4.1 MMOL/L — SIGNIFICANT CHANGE UP (ref 3.5–5.3)
POTASSIUM SERPL-SCNC: 4.1 MMOL/L — SIGNIFICANT CHANGE UP (ref 3.5–5.3)
PROT SERPL-MCNC: 6.2 G/DL — SIGNIFICANT CHANGE UP (ref 6–8.3)
RBC # BLD: 4.25 M/UL — SIGNIFICANT CHANGE UP (ref 3.8–5.2)
RBC # FLD: 12.5 % — SIGNIFICANT CHANGE UP (ref 10.3–14.5)
SODIUM SERPL-SCNC: 134 MMOL/L — LOW (ref 135–145)
TROPONIN I, HIGH SENSITIVITY RESULT: 6.4 NG/L — SIGNIFICANT CHANGE UP
WBC # BLD: 9.13 K/UL — SIGNIFICANT CHANGE UP (ref 3.8–10.5)
WBC # FLD AUTO: 9.13 K/UL — SIGNIFICANT CHANGE UP (ref 3.8–10.5)

## 2025-03-14 PROCEDURE — 80053 COMPREHEN METABOLIC PANEL: CPT

## 2025-03-14 PROCEDURE — 99284 EMERGENCY DEPT VISIT MOD MDM: CPT | Mod: 25

## 2025-03-14 PROCEDURE — 93005 ELECTROCARDIOGRAM TRACING: CPT

## 2025-03-14 PROCEDURE — 93010 ELECTROCARDIOGRAM REPORT: CPT

## 2025-03-14 PROCEDURE — 36415 COLL VENOUS BLD VENIPUNCTURE: CPT

## 2025-03-14 PROCEDURE — 99285 EMERGENCY DEPT VISIT HI MDM: CPT

## 2025-03-14 PROCEDURE — 84484 ASSAY OF TROPONIN QUANT: CPT

## 2025-03-14 PROCEDURE — 85025 COMPLETE CBC W/AUTO DIFF WBC: CPT

## 2025-03-14 NOTE — ED PROVIDER NOTE - CLINICAL SUMMARY MEDICAL DECISION MAKING FREE TEXT BOX
Patient with risk factors presenting for episode of self resolved nonexertional chest pain.  Given prior history will obtain labs EKG and troponin along with chest x-ray to evaluate for ACS, pulmonary disease or other acute life-threatening causes of reported symptoms.

## 2025-03-14 NOTE — ED ADULT TRIAGE NOTE - GLASGOW COMA SCALE: BEST VERBAL RESPONSE, MLM
RN ICU



ELAINE NP, SEEN AND EXAMINED THE PATIENT

ELAINE WAS ABLE TO TALK TO THE GRANDSON WHO IS A MEDICAL DOCTOR REGARDING THE THE PATIENT'S 
STATUS AND PROCEDURE FOR TOMORROW

CONSENT SIGNED FOR EGD

AWAITING TO BE CLEARED BY CARDIOLOGIST BEFORE THE PROCEDURE

DR. WALDROP IS INFORMED FOR SURGERY CLEARANCE (V5) oriented

## 2025-03-14 NOTE — ED PROVIDER NOTE - PROGRESS NOTE DETAILS
Patient's EKG and cardiac enzymes unremarkable.  Patient remains asymptomatic in the emergency department.  Discussed care with patient's physician at [facility] who is comfortable with her being discharged.  Prior records reviewed and she was admitted in February of this year for chest pains and was found to have negative troponins and a negative echo.  Will discharge back to assisted living facility.

## 2025-03-14 NOTE — ED ADULT NURSE NOTE - OBJECTIVE STATEMENT
Pt presents to the ED c/o non radiating midsternal chest pain x today. Pt reports she woke up after eating lunch today, she started walking and then she started experiencing the chest pain. Pt reports the chest pain has completely resolved and denies any symptoms upon ED assessment. Pt denies any SOB, dizziness, N/V. No distress noted.

## 2025-03-14 NOTE — ED PROVIDER NOTE - OBJECTIVE STATEMENT
79-year-old woman with a past medical history of hypertension, COPD, hyperlipidemia and CAD presenting for evaluation of episode of chest pain.  She reports that she woke up from a nap after her lunch and began having burning central chest pain that lasted a few hours and has now resolved.  She denies any associated shortness of breath lightheadedness nauseousness or feeling unwell.  She now reports feeling back to baseline.  She does not have any known stents or prior heart attacks.  She reports that she went on her usual walk earlier today and felt fine at that time.

## 2025-03-14 NOTE — ED PROVIDER NOTE - NSFOLLOWUPINSTRUCTIONS_ED_ALL_ED_FT
Thank you for choosing [facility] for your healthcare.    You were seen in the Emergency Department for chest pains which had resolved by the time you got the emergency department.  In the emergency room you had an EKG and blood test which showed no signs of a heart attack at this time.  You remained chest pain-free while you were here.  It is unclear why you are having chest pain but there is no evidence of a life-threatening cause at this time.  We recommend you follow-up closely with your doctors at your assisted living.  Please return to the emergency room for any further emergent or concerning medical issues.

## 2025-03-14 NOTE — ED PROVIDER NOTE - PATIENT PORTAL LINK FT
You can access the FollowMyHealth Patient Portal offered by [facility] by registering at the following website: [email or web address]. By joining [employer]’s FollowMyHealth portal, you will also be able to view your health information using other applications (apps) compatible with our system.

## 2025-04-03 NOTE — ED PROVIDER NOTE - PROGRESS NOTE DETAILS
Norman De Leon MD: Pt well appearing and asymptomatic. Pt is ambulatory and tolerating PO. Spoke with pt about return precautions. Pt agrees to follow up with their PCP. Pt ready for discharge Detail Level: Detailed Quality 226: Preventive Care And Screening: Tobacco Use: Screening And Cessation Intervention: Patient screened for tobacco use and is an ex/non-smoker

## 2025-06-11 NOTE — ED CDU PROVIDER SUBSEQUENT DAY NOTE - CPE EDP ENMT NORM
HEART FAILURE  / CONGESTIVE HEART FAILURE  DISCHARGE INSTRUCTIONS:  GUIDELINES TO FOLLOW AT HOME    Self- Managed Care:     MEDICATIONS:  Take your medication as directed. If you are experiencing any side effects, inform your doctor, Do not stop taking any of your medications without letting your doctor know.   Check with your doctor before taking any over-the-counter medications / herbal / or dietary supplements. They may interfere with your other medications.  Do not take ibuprofen (Advil or Motrin) and naproxen (Aleve) without talking to your doctor first. They could make your heart failure worse.         WEIGHT MONITORING:   Weigh yourself everyday (with the same scale) around the same time of the day and write it down. (you can chart them on a calendar or keep track of them on paper.   Notify your doctor of a weight gain of 3 pounds or more in 1 day   OR a total of 5 pounds or more in 1 week    Take your weight record to your doctor visits  Also, the same goes if you loose more than 3# in one day, let your heart doctor know.         DIET:   Cardiac heart healthy diet- Low saturated / low trans fat, no added salt, caffeine restricted, Low sodium diet-   No more than 2,000mg (2 grams) of salt / sodium per day (which equals to a little less than  a teaspoon of salt)  If your doctor wants you on a fluid restriction...it is usually recommended a fluid limit of 2,000cc -  Fluid restriction- 2,000 ml (milliliters) = 64 ounces = you can have 8 glasses of fluid per day (each glass 8 ounces)    Follow a low salt diet - avoid using salt at the table, avoid / limit use of canned soups, processed / packaged foods, salted snacks, olives and pickles.  Do not use a salt substitute without checking with your doctor, they may contain a high amount of potassioum. (Mrs. Dash is safe to use).    Limit the use of alcohol       CALL YOUR DOCTOR THE FIRST DAY YOU NOTICE ANY OF THESE   SYMPTOMS:  You have a  doctor whether you need another dose.       My Goal for Self-management of Heart Failure Includes 5 steps :    1. Notice a change in symptoms ( weight gain, short of breath, leg swelling, decreased activity level, bloating....)    2. Evaluate the change: (use the Heart Failure Zones )     3. Decide to take action: decide what your options are, such as: (call your doctor for an extra visit, take a prescribed medication, such as your water pill if your doctor has given you directions to do so, CALL YOUR DOCTOR)    4. Come up with a strategy:  (now you call the doctor for advice / appointment. This is where you take action!!!  Do not wait, catch the symptom early and treat it before it worsens.    5. Evaluate the response: The next day, check your Heart Failure Zones: are you in the GREEN ZONE (safe zone)?  Worsening symptoms of YELLOW ZONE? Or have you moved to the RED ZONE and need to call 911 or go to the Emergency Room for evaluation? Call your doctor's office to update them on your symptoms of heart failure.              normal...

## 2025-06-28 ENCOUNTER — EMERGENCY (EMERGENCY)
Facility: HOSPITAL | Age: 80
LOS: 1 days | End: 2025-06-28
Attending: EMERGENCY MEDICINE
Payer: MEDICARE

## 2025-06-28 VITALS
TEMPERATURE: 98 F | SYSTOLIC BLOOD PRESSURE: 158 MMHG | OXYGEN SATURATION: 98 % | RESPIRATION RATE: 16 BRPM | DIASTOLIC BLOOD PRESSURE: 74 MMHG | HEIGHT: 60 IN | HEART RATE: 79 BPM | WEIGHT: 145.06 LBS

## 2025-06-28 VITALS
SYSTOLIC BLOOD PRESSURE: 145 MMHG | HEART RATE: 71 BPM | OXYGEN SATURATION: 97 % | TEMPERATURE: 98 F | DIASTOLIC BLOOD PRESSURE: 90 MMHG | RESPIRATION RATE: 17 BRPM

## 2025-06-28 PROCEDURE — 99285 EMERGENCY DEPT VISIT HI MDM: CPT

## 2025-06-28 PROCEDURE — 71250 CT THORAX DX C-: CPT

## 2025-06-28 PROCEDURE — 72125 CT NECK SPINE W/O DYE: CPT | Mod: 26

## 2025-06-28 PROCEDURE — 71250 CT THORAX DX C-: CPT | Mod: 26

## 2025-06-28 PROCEDURE — 72125 CT NECK SPINE W/O DYE: CPT

## 2025-06-28 PROCEDURE — 93005 ELECTROCARDIOGRAM TRACING: CPT

## 2025-06-28 PROCEDURE — 99284 EMERGENCY DEPT VISIT MOD MDM: CPT | Mod: 25

## 2025-06-28 PROCEDURE — 73060 X-RAY EXAM OF HUMERUS: CPT | Mod: 26,RT

## 2025-06-28 PROCEDURE — 70450 CT HEAD/BRAIN W/O DYE: CPT | Mod: 26

## 2025-06-28 PROCEDURE — 93010 ELECTROCARDIOGRAM REPORT: CPT

## 2025-06-28 PROCEDURE — 73030 X-RAY EXAM OF SHOULDER: CPT

## 2025-06-28 PROCEDURE — 73060 X-RAY EXAM OF HUMERUS: CPT

## 2025-06-28 PROCEDURE — 73030 X-RAY EXAM OF SHOULDER: CPT | Mod: 26,RT

## 2025-06-28 PROCEDURE — 70450 CT HEAD/BRAIN W/O DYE: CPT

## 2025-06-28 RX ORDER — IBUPROFEN 200 MG
600 TABLET ORAL ONCE
Refills: 0 | Status: COMPLETED | OUTPATIENT
Start: 2025-06-28 | End: 2025-06-28

## 2025-06-28 RX ORDER — ACETAMINOPHEN 500 MG/5ML
650 LIQUID (ML) ORAL ONCE
Refills: 0 | Status: COMPLETED | OUTPATIENT
Start: 2025-06-28 | End: 2025-06-28

## 2025-06-28 RX ADMIN — Medication 650 MILLIGRAM(S): at 13:15

## 2025-06-28 RX ADMIN — Medication 600 MILLIGRAM(S): at 14:53

## 2025-06-28 RX ADMIN — Medication 650 MILLIGRAM(S): at 12:42

## 2025-06-28 NOTE — ED PROVIDER NOTE - OBJECTIVE STATEMENT
79-year-old woman presenting reporting pain in right shoulder, right chest wall and head after slip in the shower this morning.  She reports that she was reaching for a towel when she slipped and fell striking her right arm and right side of her chest along with her face into the wall.  She did not lose consciousness.  She does take baby aspirin no other blood thinners.  She denies any pain in her knees or hips.  Was able to stand and ambulate afterwards.  Denying any shortness of breath.

## 2025-06-28 NOTE — ED ADULT NURSE NOTE - OBJECTIVE STATEMENT
Patient BIB EMS from home s/p slip and fall today, bump on right forehead with pain on right shoulder, no dizziness, LOC noted. c/o chest pain started on the way to hospital.

## 2025-06-28 NOTE — ED PROVIDER NOTE - PHYSICAL EXAMINATION
Exam:  General: Patient well appearing, vital signs within normal limits  HEENT: airway patent with moist mucous membranes  Cardiac: RRR S1/S2 with strong peripheral pulses  Respiratory: lungs clear without respiratory distress, R lateral chest wall tenderness  GI: abdomen soft, non tender, non distended  MSK: no midline tenderness, no deformity R shoulder, ROM limited secondary to pain  Neuro: no gross neurologic deficits  Skin: warm, well perfused  Psych: normal mood and affect

## 2025-06-28 NOTE — ED PROVIDER NOTE - PROGRESS NOTE DETAILS
Patient reporting pain is controlled with Tylenol.  There are no emergently actionable findings on CT imaging or x-rays.  Patient is stable for discharge back to her assisted living.

## 2025-06-28 NOTE — ED ADULT NURSE NOTE - NSFALLRISKINTERV_ED_ALL_ED

## 2025-06-28 NOTE — ED PROVIDER NOTE - PATIENT PORTAL LINK FT
You can access the FollowMyHealth Patient Portal offered by [facility] by registering at the following website: [email or web address]. By joining [employer]’s FollowMyHealth portal, you will also be able to view your health information using other applications (apps) compatible with our system.

## 2025-06-28 NOTE — ED ADULT TRIAGE NOTE - CHIEF COMPLAINT QUOTE
chest pain started on the way to hospital; s/p slip and fall today, bump on right forehead with pain on right shoulder. Denies any LOC. EMS gave 162mg ASA.

## 2025-06-28 NOTE — ED PROVIDER NOTE - NSFOLLOWUPINSTRUCTIONS_ED_ALL_ED_FT
Thank you for choosing [facility] for your healthcare.    You were seen in the Emergency Department for pain in your shoulder and chest after a fall in the shower.  In the emergency room you had CAT scans and x-rays which did not show any damage to your brain or skull, any broken ribs or any fractures of your arm.  We recommend medications like Tylenol as needed for pain at home.  Please follow-up with your doctors in the next few days if your pains are not improving on their own.  Please return to the emergency department for any further emergent or concerning medical issues.

## 2025-06-28 NOTE — ED PROVIDER NOTE - CLINICAL SUMMARY MEDICAL DECISION MAKING FREE TEXT BOX
Patient presenting reporting right shoulder and chest wall pain after mechanical trip and fall.  Will treat pain in the emergency department.  Will obtain imaging to evaluate for possible rib fracture shoulder fracture or intracranial injury.
